# Patient Record
Sex: FEMALE | Race: WHITE | NOT HISPANIC OR LATINO | Employment: OTHER | RURAL
[De-identification: names, ages, dates, MRNs, and addresses within clinical notes are randomized per-mention and may not be internally consistent; named-entity substitution may affect disease eponyms.]

---

## 2018-02-08 ENCOUNTER — HISTORICAL (OUTPATIENT)
Dept: ADMINISTRATIVE | Facility: HOSPITAL | Age: 78
End: 2018-02-08

## 2018-02-09 LAB
LAB AP CLINICAL INFORMATION: NORMAL
LAB AP DIAGNOSIS - HISTORICAL: NORMAL
LAB AP GROSS PATHOLOGY - HISTORICAL: NORMAL
LAB AP SPECIMEN SUBMITTED - HISTORICAL: NORMAL

## 2020-12-11 ENCOUNTER — HISTORICAL (OUTPATIENT)
Dept: ADMINISTRATIVE | Facility: HOSPITAL | Age: 80
End: 2020-12-11

## 2020-12-11 LAB
BILIRUB UR QL STRIP: NEGATIVE MG/DL
CLARITY UR: CLEAR
COLOR UR: ABNORMAL
GLUCOSE UR STRIP-MCNC: NEGATIVE MG/DL
KETONES UR STRIP-SCNC: NEGATIVE MG/DL
LEUKOCYTE ESTERASE UR QL STRIP: NEGATIVE LEU/UL
NITRITE UR QL STRIP: NEGATIVE
PH UR STRIP: 6 PH UNITS (ref 5–8)
PROT UR QL STRIP: NEGATIVE MG/DL
RBC # UR STRIP: NEGATIVE ERY/UL
SP GR UR STRIP: <=1.005 (ref 1–1.03)
UROBILINOGEN UR STRIP-ACNC: 0.2 EU/DL

## 2021-01-05 ENCOUNTER — HISTORICAL (OUTPATIENT)
Dept: ADMINISTRATIVE | Facility: HOSPITAL | Age: 81
End: 2021-01-05

## 2021-01-05 LAB
ANION GAP SERPL CALCULATED.3IONS-SCNC: 13 MMOL/L
APTT PPP: 28.8 SECONDS (ref 25.2–37.3)
BASOPHILS # BLD AUTO: 0.03 X10E3/UL (ref 0–0.2)
BASOPHILS NFR BLD AUTO: 0.5 % (ref 0–1)
BILIRUB UR QL STRIP: NEGATIVE MG/DL
BUN SERPL-MCNC: 20 MG/DL (ref 7–18)
CALCIUM SERPL-MCNC: 9 MG/DL (ref 8.5–10.1)
CHLORIDE SERPL-SCNC: 104 MMOL/L (ref 98–107)
CK MB SERPL-MCNC: <1 NG/ML (ref 1–3.6)
CK SERPL-CCNC: 65 U/L (ref 26–192)
CLARITY UR: CLEAR
CO2 SERPL-SCNC: 28 MMOL/L (ref 21–32)
COLOR UR: ABNORMAL
CREAT SERPL-MCNC: 1.09 MG/DL (ref 0.55–1.02)
EOSINOPHIL # BLD AUTO: 0.18 X10E3/UL (ref 0–0.5)
EOSINOPHIL NFR BLD AUTO: 3 % (ref 1–4)
ERYTHROCYTE [DISTWIDTH] IN BLOOD BY AUTOMATED COUNT: 12.7 % (ref 11.5–14.5)
GLUCOSE SERPL-MCNC: 96 MG/DL (ref 74–106)
GLUCOSE UR STRIP-MCNC: NEGATIVE MG/DL
HCT VFR BLD AUTO: 42 % (ref 38–47)
HGB BLD-MCNC: 14 G/DL (ref 12–16)
IMM GRANULOCYTES # BLD AUTO: 0.02 X10E3/UL (ref 0–0.04)
IMM GRANULOCYTES NFR BLD: 0.3 % (ref 0–0.4)
INR BLD: 1.01 (ref 0–3.3)
KETONES UR STRIP-SCNC: NEGATIVE MG/DL
LEUKOCYTE ESTERASE UR QL STRIP: NEGATIVE LEU/UL
LYMPHOCYTES # BLD AUTO: 1.4 X10E3/UL (ref 1–4.8)
LYMPHOCYTES NFR BLD AUTO: 23.3 % (ref 27–41)
MAGNESIUM SERPL-MCNC: 1.9 MG/DL (ref 1.7–2.3)
MCH RBC QN AUTO: 29.8 PG (ref 27–31)
MCHC RBC AUTO-ENTMCNC: 33.3 G/DL (ref 32–36)
MCV RBC AUTO: 89.4 FL (ref 80–96)
MONOCYTES # BLD AUTO: 0.42 X10E3/UL (ref 0–0.8)
MONOCYTES NFR BLD AUTO: 7 % (ref 2–6)
MPC BLD CALC-MCNC: 9.7 FL (ref 9.4–12.4)
MYOGLOBIN SERPL-MCNC: 66 NG/ML (ref 13–71)
NEUTROPHILS # BLD AUTO: 3.97 X10E3/UL (ref 1.8–7.7)
NEUTROPHILS NFR BLD AUTO: 65.9 % (ref 53–65)
NITRITE UR QL STRIP: NEGATIVE
NRBC # BLD AUTO: 0 X10E3/UL (ref 0–0)
NRBC, AUTO (.00): 0 /100 (ref 0–0)
PH UR STRIP: 7.5 PH UNITS (ref 5–8)
PLATELET # BLD AUTO: 204 X10E3/UL (ref 150–400)
POTASSIUM SERPL-SCNC: 3.9 MMOL/L (ref 3.5–5.1)
PROT UR QL STRIP: NEGATIVE MG/DL
PROTHROMBIN TIME: 12.8 SECONDS (ref 11.7–14.7)
RBC # BLD AUTO: 4.7 X10E6/UL (ref 4.2–5.4)
RBC # UR STRIP: NEGATIVE ERY/UL
SARS-COV-2 RNA AMPLIFICATION, QUAL: NEGATIVE
SODIUM SERPL-SCNC: 141 MMOL/L (ref 136–145)
SP GR UR STRIP: 1.01 (ref 1–1.03)
TROPONIN I SERPL-MCNC: 0.32 NG/ML (ref 0–0.06)
UROBILINOGEN UR STRIP-ACNC: 0.2 EU/DL
WBC # BLD AUTO: 6.02 X10E3/UL (ref 4.5–11)

## 2021-01-29 ENCOUNTER — HISTORICAL (OUTPATIENT)
Dept: ADMINISTRATIVE | Facility: HOSPITAL | Age: 81
End: 2021-01-29

## 2021-04-23 DIAGNOSIS — M41.9 SCOLIOSIS, UNSPECIFIED SCOLIOSIS TYPE, UNSPECIFIED SPINAL REGION: Primary | ICD-10-CM

## 2021-05-09 ENCOUNTER — HISTORICAL (OUTPATIENT)
Dept: ADMINISTRATIVE | Facility: HOSPITAL | Age: 81
End: 2021-05-09

## 2021-05-09 LAB
ALBUMIN SERPL BCP-MCNC: 3.5 G/DL (ref 3.4–5)
ALBUMIN/GLOB SERPL: 1 {RATIO}
ALP SERPL-CCNC: 81 U/L (ref 50–136)
ALT SERPL W P-5'-P-CCNC: 22 U/L (ref 12–78)
ANION GAP SERPL CALCULATED.3IONS-SCNC: 13 MMOL/L
AST SERPL W P-5'-P-CCNC: 25 U/L (ref 15–37)
BASOPHILS # BLD AUTO: 0.04 X10E3/UL (ref 0–0.2)
BASOPHILS NFR BLD AUTO: 0.6 % (ref 0–1)
BILIRUB SERPL-MCNC: 0.3 MG/DL (ref 0.2–1)
BUN SERPL-MCNC: 21 MG/DL (ref 7–18)
CALCIUM SERPL-MCNC: 9 MG/DL (ref 8.5–10.1)
CHLORIDE SERPL-SCNC: 103 MMOL/L (ref 101–111)
CO2 SERPL-SCNC: 27 MMOL/L (ref 21–32)
CREAT SERPL-MCNC: 1.4 MG/DL (ref 0.6–1.3)
EOSINOPHIL # BLD AUTO: 0.14 X10E3/UL (ref 0–0.5)
EOSINOPHIL NFR BLD AUTO: 2.1 % (ref 1–4)
ERYTHROCYTE [DISTWIDTH] IN BLOOD BY AUTOMATED COUNT: 14.2 % (ref 11.5–14.5)
GLOBULIN SER-MCNC: 3.2 G/DL
GLUCOSE SERPL-MCNC: 91 MG/DL (ref 74–106)
HCT VFR BLD AUTO: 41.2 % (ref 38–47)
HGB BLD-MCNC: 13.4 G/DL (ref 12–16)
LYMPHOCYTES # BLD AUTO: 1.31 X10E3/UL (ref 1–4.8)
LYMPHOCYTES NFR BLD AUTO: 19.6 % (ref 27–41)
MCH RBC QN AUTO: 29.3 PG (ref 27–31)
MCHC RBC AUTO-ENTMCNC: 32.5 G/DL (ref 32–36)
MCV RBC AUTO: 90 FL (ref 80–96)
MONOCYTES # BLD AUTO: 0.72 X10E3/UL (ref 0–0.8)
MONOCYTES NFR BLD AUTO: 10.8 % (ref 2–6)
MPC BLD CALC-MCNC: 9.8 FL (ref 9.4–12.4)
NEUTROPHILS # BLD AUTO: 4.46 X10E3/UL (ref 1.8–7.7)
NEUTROPHILS NFR BLD AUTO: 66.9 % (ref 53–65)
PLATELET # BLD AUTO: 239 X10E3/UL (ref 150–400)
POTASSIUM SERPL-SCNC: 4.2 MMOL/L (ref 3.6–5)
PROT SERPL-MCNC: 6.7 G/DL (ref 6.4–8.2)
RBC # BLD AUTO: 4.57 X10E6/UL (ref 4.2–5.4)
SODIUM SERPL-SCNC: 139 MMOL/L (ref 135–145)
TROPONIN I SERPL-MCNC: 0.11 NG/ML (ref 0–0.06)
WBC # BLD AUTO: 6.67 X10E3/UL (ref 4.5–11)

## 2021-05-10 ENCOUNTER — HISTORICAL (OUTPATIENT)
Dept: ADMINISTRATIVE | Facility: HOSPITAL | Age: 81
End: 2021-05-10

## 2021-05-10 LAB — TROPONIN I SERPL-MCNC: 0.14 NG/ML (ref 0–0.06)

## 2021-06-04 VITALS — HEIGHT: 63 IN

## 2021-06-04 RX ORDER — FUROSEMIDE 20 MG/1
20 TABLET ORAL DAILY PRN
COMMUNITY
End: 2021-06-09

## 2021-06-04 RX ORDER — LEVOTHYROXINE SODIUM 75 UG/1
75 TABLET ORAL DAILY
COMMUNITY
Start: 2021-04-16 | End: 2021-06-22 | Stop reason: SDUPTHER

## 2021-06-04 RX ORDER — APIXABAN 5 MG/1
5 TABLET, FILM COATED ORAL 2 TIMES DAILY
COMMUNITY
Start: 2021-04-27 | End: 2021-06-22 | Stop reason: SDUPTHER

## 2021-06-04 RX ORDER — METOPROLOL SUCCINATE 50 MG/1
50 TABLET, EXTENDED RELEASE ORAL DAILY
COMMUNITY
End: 2021-06-09

## 2021-06-04 RX ORDER — ASPIRIN 81 MG/1
81 TABLET ORAL DAILY
COMMUNITY
End: 2021-06-09

## 2021-06-04 RX ORDER — ATORVASTATIN CALCIUM 10 MG/1
10 TABLET, FILM COATED ORAL DAILY
COMMUNITY
Start: 2021-03-16 | End: 2021-06-22 | Stop reason: SDUPTHER

## 2021-06-09 ENCOUNTER — OFFICE VISIT (OUTPATIENT)
Dept: CARDIOLOGY | Facility: CLINIC | Age: 81
End: 2021-06-09
Payer: MEDICARE

## 2021-06-09 VITALS
DIASTOLIC BLOOD PRESSURE: 62 MMHG | WEIGHT: 122 LBS | OXYGEN SATURATION: 99 % | HEART RATE: 73 BPM | HEIGHT: 63 IN | BODY MASS INDEX: 21.62 KG/M2 | SYSTOLIC BLOOD PRESSURE: 108 MMHG

## 2021-06-09 DIAGNOSIS — E78.5 HYPERLIPIDEMIA, UNSPECIFIED HYPERLIPIDEMIA TYPE: ICD-10-CM

## 2021-06-09 DIAGNOSIS — R06.02 SHORTNESS OF BREATH: ICD-10-CM

## 2021-06-09 DIAGNOSIS — I48.0 PAROXYSMAL ATRIAL FIBRILLATION: ICD-10-CM

## 2021-06-09 DIAGNOSIS — I25.10 CORONARY ARTERY DISEASE INVOLVING NATIVE CORONARY ARTERY OF NATIVE HEART WITHOUT ANGINA PECTORIS: ICD-10-CM

## 2021-06-09 DIAGNOSIS — I10 HYPERTENSION, UNSPECIFIED TYPE: ICD-10-CM

## 2021-06-09 DIAGNOSIS — I25.10 ATHEROSCLEROSIS OF NATIVE CORONARY ARTERY OF NATIVE HEART WITHOUT ANGINA PECTORIS: ICD-10-CM

## 2021-06-09 PROCEDURE — 99214 OFFICE O/P EST MOD 30 MIN: CPT | Mod: S$PBB,,, | Performed by: NURSE PRACTITIONER

## 2021-06-09 PROCEDURE — 99214 PR OFFICE/OUTPT VISIT, EST, LEVL IV, 30-39 MIN: ICD-10-PCS | Mod: S$PBB,,, | Performed by: NURSE PRACTITIONER

## 2021-06-09 PROCEDURE — 99213 OFFICE O/P EST LOW 20 MIN: CPT | Mod: PBBFAC | Performed by: NURSE PRACTITIONER

## 2021-06-10 PROBLEM — I25.10 CORONARY ARTERY DISEASE INVOLVING NATIVE CORONARY ARTERY OF NATIVE HEART: Status: ACTIVE | Noted: 2021-06-10

## 2021-06-23 ENCOUNTER — HOSPITAL ENCOUNTER (OUTPATIENT)
Dept: RADIOLOGY | Facility: HOSPITAL | Age: 81
Discharge: HOME | End: 2021-06-23
Attending: NURSE PRACTITIONER
Payer: MEDICARE

## 2021-06-23 ENCOUNTER — HOSPITAL ENCOUNTER (OUTPATIENT)
Dept: CARDIOLOGY | Facility: HOSPITAL | Age: 81
Discharge: HOME | End: 2021-06-23
Attending: NURSE PRACTITIONER
Payer: MEDICARE

## 2021-06-23 VITALS — HEIGHT: 63 IN | BODY MASS INDEX: 21.62 KG/M2 | WEIGHT: 122 LBS

## 2021-06-23 VITALS
WEIGHT: 122 LBS | HEIGHT: 63 IN | HEART RATE: 60 BPM | SYSTOLIC BLOOD PRESSURE: 139 MMHG | BODY MASS INDEX: 21.62 KG/M2 | DIASTOLIC BLOOD PRESSURE: 72 MMHG

## 2021-06-23 DIAGNOSIS — R06.02 SHORTNESS OF BREATH: ICD-10-CM

## 2021-06-23 DIAGNOSIS — I10 HYPERTENSION, UNSPECIFIED TYPE: ICD-10-CM

## 2021-06-23 DIAGNOSIS — I48.0 PAROXYSMAL ATRIAL FIBRILLATION: ICD-10-CM

## 2021-06-23 DIAGNOSIS — E78.5 HYPERLIPIDEMIA, UNSPECIFIED HYPERLIPIDEMIA TYPE: ICD-10-CM

## 2021-06-23 DIAGNOSIS — I25.10 ATHEROSCLEROSIS OF NATIVE CORONARY ARTERY OF NATIVE HEART WITHOUT ANGINA PECTORIS: ICD-10-CM

## 2021-06-23 PROCEDURE — 78452 HT MUSCLE IMAGE SPECT MULT: CPT | Mod: TC

## 2021-06-23 PROCEDURE — 93306 TTE W/DOPPLER COMPLETE: CPT

## 2021-06-23 PROCEDURE — 93018 NUCLEAR STRESS TEST (CUPID ONLY): ICD-10-PCS | Mod: ,,, | Performed by: INTERNAL MEDICINE

## 2021-06-23 PROCEDURE — 93306 TTE W/DOPPLER COMPLETE: CPT | Mod: 26,,, | Performed by: INTERNAL MEDICINE

## 2021-06-23 PROCEDURE — 78452 NM MYOCARDIAL PERFUSION SPECT MULTI PHARM: ICD-10-PCS | Mod: 26,,, | Performed by: INTERNAL MEDICINE

## 2021-06-23 PROCEDURE — 93016 CV STRESS TEST SUPVJ ONLY: CPT | Mod: ,,, | Performed by: NURSE PRACTITIONER

## 2021-06-23 PROCEDURE — 93017 CV STRESS TEST TRACING ONLY: CPT

## 2021-06-23 PROCEDURE — 93018 CV STRESS TEST I&R ONLY: CPT | Mod: ,,, | Performed by: INTERNAL MEDICINE

## 2021-06-23 PROCEDURE — 93306 ECHO (CUPID ONLY): ICD-10-PCS | Mod: 26,,, | Performed by: INTERNAL MEDICINE

## 2021-06-23 PROCEDURE — A9500 TC99M SESTAMIBI: HCPCS

## 2021-06-23 PROCEDURE — 78452 HT MUSCLE IMAGE SPECT MULT: CPT | Mod: 26,,, | Performed by: INTERNAL MEDICINE

## 2021-06-23 PROCEDURE — 93016 NUCLEAR STRESS TEST (CUPID ONLY): ICD-10-PCS | Mod: ,,, | Performed by: NURSE PRACTITIONER

## 2021-06-23 PROCEDURE — 63600175 PHARM REV CODE 636 W HCPCS: Performed by: NURSE PRACTITIONER

## 2021-06-23 RX ORDER — ATORVASTATIN CALCIUM 10 MG/1
10 TABLET, FILM COATED ORAL DAILY
Qty: 90 TABLET | Refills: 0 | Status: SHIPPED | OUTPATIENT
Start: 2021-06-23 | End: 2021-08-24 | Stop reason: SDUPTHER

## 2021-06-23 RX ORDER — APIXABAN 5 MG/1
5 TABLET, FILM COATED ORAL 2 TIMES DAILY
Qty: 180 TABLET | Refills: 0 | Status: SHIPPED | OUTPATIENT
Start: 2021-06-23 | End: 2021-11-01 | Stop reason: DRUGHIGH

## 2021-06-23 RX ORDER — REGADENOSON 0.08 MG/ML
0.4 INJECTION, SOLUTION INTRAVENOUS ONCE
Status: COMPLETED | OUTPATIENT
Start: 2021-06-23 | End: 2021-06-23

## 2021-06-23 RX ORDER — LEVOTHYROXINE SODIUM 75 UG/1
75 TABLET ORAL DAILY
Qty: 90 TABLET | Refills: 0 | Status: SHIPPED | OUTPATIENT
Start: 2021-06-23 | End: 2021-08-24 | Stop reason: SDUPTHER

## 2021-06-23 RX ADMIN — REGADENOSON 0.4 MG: 0.08 INJECTION, SOLUTION INTRAVENOUS at 10:06

## 2021-06-28 LAB
CV STRESS BASE HR: 60 BPM
DIASTOLIC BLOOD PRESSURE: 72 MMHG
OHS CV CPX 1 MINUTE RECOVERY HEART RATE: 83 BPM
OHS CV CPX 85 PERCENT MAX PREDICTED HEART RATE MALE: 114
OHS CV CPX MAX PREDICTED HEART RATE: 134
OHS CV CPX PATIENT IS FEMALE: 1
OHS CV CPX PATIENT IS MALE: 0
OHS CV CPX PEAK DIASTOLIC BLOOD PRESSURE: 72 MMHG
OHS CV CPX PEAK HEAR RATE: 88 BPM
OHS CV CPX PEAK RATE PRESSURE PRODUCT: NORMAL
OHS CV CPX PEAK SYSTOLIC BLOOD PRESSURE: 139 MMHG
OHS CV CPX PERCENT MAX PREDICTED HEART RATE ACHIEVED: 66
OHS CV CPX RATE PRESSURE PRODUCT PRESENTING: 8340
SYSTOLIC BLOOD PRESSURE: 139 MMHG

## 2021-07-01 LAB
AORTIC VALVE CUSP SEPERATION: 1.64 CM
AV INDEX (PROSTH): 0.95
AV MEAN GRADIENT: 2 MMHG
AV REGURGITATION PRESSURE HALF TIME: 670 MS
AV VALVE AREA: 2.99 CM2
BSA FOR ECHO PROCEDURE: 1.57 M2
CV ECHO LV RWT: 0.39 CM
DOP CALC AO VTI: 24.18 CM
DOP CALC LVOT AREA: 3.1 CM2
DOP CALC LVOT DIAMETER: 2 CM
DOP CALC LVOT STROKE VOLUME: 72.31 CM3
DOP CALCLVOT PEAK VEL VTI: 23.03 CM
E WAVE DECELERATION TIME: 533 MSEC
E/A RATIO: 0.73
ECHO LV POSTERIOR WALL: 0.79 CM (ref 0.6–1.1)
EJECTION FRACTION: 60 %
FRACTIONAL SHORTENING: 28 % (ref 28–44)
INTERVENTRICULAR SEPTUM: 0.75 CM (ref 0.6–1.1)
IVC DIAMETER: 1.52 CM
LEFT ATRIUM SIZE: 3 CM
LEFT INTERNAL DIMENSION IN SYSTOLE: 2.95 CM (ref 2.1–4)
LEFT VENTRICLE DIASTOLIC VOLUME INDEX: 27.26 ML/M2
LEFT VENTRICLE DIASTOLIC VOLUME: 42.8 ML
LEFT VENTRICLE MASS INDEX: 59 G/M2
LEFT VENTRICLE SYSTOLIC VOLUME INDEX: 10.1 ML/M2
LEFT VENTRICLE SYSTOLIC VOLUME: 15.9 ML
LEFT VENTRICULAR INTERNAL DIMENSION IN DIASTOLE: 4.09 CM (ref 3.5–6)
LEFT VENTRICULAR MASS: 92.15 G
LVOT MG: 1.7 MMHG
MV PEAK A VEL: 1.5 M/S
MV PEAK E VEL: 1.1 M/S
PISA AR MAX VEL: 3.89 M/S
PISA TR MAX VEL: 2.7 M/S
RA WIDTH: 2.06 CM
RIGHT VENTRICULAR END-DIASTOLIC DIMENSION: 2.8 CM
RIGHT VENTRICULAR LENGTH IN DIASTOLE (APICAL 4-CHAMBER VIEW): 5.85 CM
RV MID DIAMA: 2.83 CM
TR MAX PG: 29 MMHG
TRICUSPID ANNULAR PLANE SYSTOLIC EXCURSION: 2.44 CM

## 2021-07-08 ENCOUNTER — OFFICE VISIT (OUTPATIENT)
Dept: CARDIOLOGY | Facility: CLINIC | Age: 81
End: 2021-07-08
Payer: MEDICARE

## 2021-07-08 VITALS
SYSTOLIC BLOOD PRESSURE: 122 MMHG | HEIGHT: 63 IN | WEIGHT: 118 LBS | BODY MASS INDEX: 20.91 KG/M2 | OXYGEN SATURATION: 98 % | DIASTOLIC BLOOD PRESSURE: 58 MMHG | HEART RATE: 71 BPM

## 2021-07-08 DIAGNOSIS — I25.10 CORONARY ARTERY DISEASE INVOLVING NATIVE CORONARY ARTERY OF NATIVE HEART WITHOUT ANGINA PECTORIS: Primary | ICD-10-CM

## 2021-07-08 PROCEDURE — 99213 OFFICE O/P EST LOW 20 MIN: CPT | Mod: PBBFAC | Performed by: NURSE PRACTITIONER

## 2021-07-08 PROCEDURE — 99214 PR OFFICE/OUTPT VISIT, EST, LEVL IV, 30-39 MIN: ICD-10-PCS | Mod: S$PBB,,, | Performed by: NURSE PRACTITIONER

## 2021-07-08 PROCEDURE — 99214 OFFICE O/P EST MOD 30 MIN: CPT | Mod: S$PBB,,, | Performed by: NURSE PRACTITIONER

## 2021-07-08 RX ORDER — NITROGLYCERIN 0.4 MG/1
0.4 TABLET SUBLINGUAL EVERY 5 MIN PRN
Qty: 25 TABLET | Refills: 3 | Status: SHIPPED | OUTPATIENT
Start: 2021-07-08

## 2021-08-05 ENCOUNTER — HOSPITAL ENCOUNTER (EMERGENCY)
Facility: HOSPITAL | Age: 81
Discharge: HOME | End: 2021-08-05
Payer: MEDICARE

## 2021-08-05 VITALS
DIASTOLIC BLOOD PRESSURE: 57 MMHG | RESPIRATION RATE: 20 BRPM | HEART RATE: 64 BPM | SYSTOLIC BLOOD PRESSURE: 119 MMHG | TEMPERATURE: 98 F | BODY MASS INDEX: 22.09 KG/M2 | HEIGHT: 61 IN | OXYGEN SATURATION: 97 % | WEIGHT: 117 LBS

## 2021-08-05 DIAGNOSIS — R07.89 ACUTE CHEST WALL PAIN: ICD-10-CM

## 2021-08-05 DIAGNOSIS — R07.81 RIB PAIN ON RIGHT SIDE: Primary | ICD-10-CM

## 2021-08-05 PROCEDURE — 99282 PR EMERGENCY DEPT VISIT,LEVEL II: ICD-10-PCS | Mod: ,,, | Performed by: NURSE PRACTITIONER

## 2021-08-05 PROCEDURE — 99282 EMERGENCY DEPT VISIT SF MDM: CPT | Mod: ,,, | Performed by: NURSE PRACTITIONER

## 2021-08-05 PROCEDURE — 99283 EMERGENCY DEPT VISIT LOW MDM: CPT

## 2021-08-09 ENCOUNTER — TELEPHONE (OUTPATIENT)
Dept: EMERGENCY MEDICINE | Facility: HOSPITAL | Age: 81
End: 2021-08-09

## 2021-08-13 ENCOUNTER — OFFICE VISIT (OUTPATIENT)
Dept: NEUROLOGY | Facility: CLINIC | Age: 81
End: 2021-08-13
Payer: MEDICARE

## 2021-08-13 VITALS — DIASTOLIC BLOOD PRESSURE: 66 MMHG | SYSTOLIC BLOOD PRESSURE: 140 MMHG
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
## 2021-08-13 DIAGNOSIS — Z79.899 ON LONG TERM DRUG THERAPY: ICD-10-CM

## 2021-08-13 DIAGNOSIS — R26.9 ABNORMAL GAIT: ICD-10-CM

## 2021-08-13 DIAGNOSIS — I48.0 PAROXYSMAL ATRIAL FIBRILLATION: ICD-10-CM

## 2021-08-13 DIAGNOSIS — I63.9 CEREBROVASCULAR ACCIDENT (CVA), UNSPECIFIED MECHANISM: Primary | ICD-10-CM

## 2021-08-13 DIAGNOSIS — F03.90 DEMENTIA WITHOUT BEHAVIORAL DISTURBANCE, UNSPECIFIED DEMENTIA TYPE: ICD-10-CM

## 2021-08-13 DIAGNOSIS — I10 HYPERTENSION, UNSPECIFIED TYPE: ICD-10-CM
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
## 2021-08-13 PROCEDURE — 99213 PR OFFICE/OUTPT VISIT, EST, LEVL III, 20-29 MIN: ICD-10-PCS | Mod: S$PBB,ICN,, | Performed by: NURSE PRACTITIONER

## 2021-08-13 PROCEDURE — 99213 OFFICE O/P EST LOW 20 MIN: CPT | Mod: S$PBB,ICN,, | Performed by: NURSE PRACTITIONER

## 2021-08-13 PROCEDURE — 99213 OFFICE O/P EST LOW 20 MIN: CPT | Mod: PBBFAC | Performed by: NURSE PRACTITIONER

## 2021-08-16 RX ORDER — ERGOCALCIFEROL 1.25 MG/1
CAPSULE ORAL
Qty: 12 CAPSULE | Refills: 1 | Status: SHIPPED | OUTPATIENT
Start: 2021-08-16 | End: 2022-10-03

## 2021-08-17 ENCOUNTER — TELEPHONE (OUTPATIENT)
Dept: NEUROLOGY | Facility: CLINIC | Age: 81
End: 2021-08-17

## 2021-08-24 ENCOUNTER — OFFICE VISIT (OUTPATIENT)
Dept: FAMILY MEDICINE | Facility: CLINIC | Age: 81
End: 2021-08-24
Payer: MEDICARE

## 2021-08-24 VITALS
RESPIRATION RATE: 16 BRPM | BODY MASS INDEX: 23.18 KG/M2 | TEMPERATURE: 98 F | DIASTOLIC BLOOD PRESSURE: 78 MMHG | WEIGHT: 115 LBS | SYSTOLIC BLOOD PRESSURE: 134 MMHG | HEART RATE: 70 BPM | OXYGEN SATURATION: 99 % | HEIGHT: 59 IN

## 2021-08-24 DIAGNOSIS — R79.89 INCREASED AMMONIA LEVEL: Primary | ICD-10-CM

## 2021-08-24 DIAGNOSIS — E78.5 HYPERLIPIDEMIA, UNSPECIFIED HYPERLIPIDEMIA TYPE: ICD-10-CM

## 2021-08-24 DIAGNOSIS — E03.9 ACQUIRED HYPOTHYROIDISM: ICD-10-CM

## 2021-08-24 DIAGNOSIS — I48.11 LONGSTANDING PERSISTENT ATRIAL FIBRILLATION: ICD-10-CM

## 2021-08-24 LAB
ALBUMIN SERPL BCP-MCNC: 3.7 G/DL (ref 3.5–5)
ALP SERPL-CCNC: 106 U/L (ref 55–142)
ALT SERPL W P-5'-P-CCNC: 22 U/L (ref 13–56)
AMMONIA PLAS-SCNC: 15 ΜMOL/L (ref 11–32)
ANION GAP SERPL CALCULATED.3IONS-SCNC: 12 MMOL/L (ref 7–16)
APTT PPP: 39.3 SECONDS (ref 25.2–37.3)
AST SERPL W P-5'-P-CCNC: 28 U/L (ref 15–37)
BILIRUB DIRECT SERPL-MCNC: 0.2 MG/DL (ref 0–0.2)
BILIRUB SERPL-MCNC: 0.4 MG/DL (ref 0–1.2)
BUN SERPL-MCNC: 22 MG/DL (ref 7–18)
BUN/CREAT SERPL: 21 (ref 6–20)
CALCIUM SERPL-MCNC: 9.7 MG/DL (ref 8.5–10.1)
CHLORIDE SERPL-SCNC: 104 MMOL/L (ref 98–107)
CO2 SERPL-SCNC: 29 MMOL/L (ref 21–32)
CREAT SERPL-MCNC: 1.04 MG/DL (ref 0.55–1.02)
GLUCOSE SERPL-MCNC: 84 MG/DL (ref 74–106)
INR BLD: 1.17 (ref 0.9–1.1)
POTASSIUM SERPL-SCNC: 4.5 MMOL/L (ref 3.5–5.1)
PROT SERPL-MCNC: 7.4 G/DL (ref 6.4–8.2)
PROTHROMBIN TIME: 14.9 SECONDS (ref 11.7–14.7)
SODIUM SERPL-SCNC: 140 MMOL/L (ref 136–145)

## 2021-08-24 PROCEDURE — 82248 HEPATIC FUNCTION PANEL: ICD-10-PCS | Mod: ,,, | Performed by: CLINICAL MEDICAL LABORATORY

## 2021-08-24 PROCEDURE — 85610 PROTHROMBIN TIME: CPT | Performed by: INTERNAL MEDICINE

## 2021-08-24 PROCEDURE — 99214 OFFICE O/P EST MOD 30 MIN: CPT | Mod: ,,, | Performed by: INTERNAL MEDICINE

## 2021-08-24 PROCEDURE — 80053 COMPREHEN METABOLIC PANEL: CPT | Mod: ,,, | Performed by: CLINICAL MEDICAL LABORATORY

## 2021-08-24 PROCEDURE — 82140 AMMONIA: ICD-10-PCS | Mod: ,,, | Performed by: CLINICAL MEDICAL LABORATORY

## 2021-08-24 PROCEDURE — 85730 THROMBOPLASTIN TIME PARTIAL: CPT | Performed by: INTERNAL MEDICINE

## 2021-08-24 PROCEDURE — 82248 BILIRUBIN DIRECT: CPT | Mod: ,,, | Performed by: CLINICAL MEDICAL LABORATORY

## 2021-08-24 PROCEDURE — 80053 HEPATIC FUNCTION PANEL: ICD-10-PCS | Mod: ,,, | Performed by: CLINICAL MEDICAL LABORATORY

## 2021-08-24 PROCEDURE — 82140 ASSAY OF AMMONIA: CPT | Mod: ,,, | Performed by: CLINICAL MEDICAL LABORATORY

## 2021-08-24 PROCEDURE — 99214 PR OFFICE/OUTPT VISIT, EST, LEVL IV, 30-39 MIN: ICD-10-PCS | Mod: ,,, | Performed by: INTERNAL MEDICINE

## 2021-08-24 RX ORDER — LEVOTHYROXINE SODIUM 75 UG/1
75 TABLET ORAL DAILY
Qty: 90 TABLET | Refills: 1 | Status: ON HOLD | OUTPATIENT
Start: 2021-08-24 | End: 2021-12-19 | Stop reason: HOSPADM

## 2021-08-24 RX ORDER — ATORVASTATIN CALCIUM 10 MG/1
10 TABLET, FILM COATED ORAL DAILY
Qty: 90 TABLET | Refills: 1 | Status: ON HOLD | OUTPATIENT
Start: 2021-08-24 | End: 2021-12-19 | Stop reason: HOSPADM

## 2021-08-25 DIAGNOSIS — R79.89 INCREASED AMMONIA LEVEL: Primary | ICD-10-CM

## 2021-08-30 ENCOUNTER — HOSPITAL ENCOUNTER (EMERGENCY)
Facility: HOSPITAL | Age: 81
Discharge: HOME | End: 2021-08-30
Payer: MEDICARE

## 2021-08-30 VITALS
TEMPERATURE: 98 F | OXYGEN SATURATION: 99 % | HEIGHT: 59 IN | DIASTOLIC BLOOD PRESSURE: 84 MMHG | HEART RATE: 74 BPM | WEIGHT: 115 LBS | SYSTOLIC BLOOD PRESSURE: 167 MMHG | RESPIRATION RATE: 16 BRPM | BODY MASS INDEX: 23.18 KG/M2

## 2021-08-30 DIAGNOSIS — R07.9 CHEST PAIN: Primary | ICD-10-CM

## 2021-08-30 LAB
ALBUMIN SERPL BCP-MCNC: 3.4 G/DL (ref 3.5–5)
ALBUMIN/GLOB SERPL: 1 {RATIO}
ALP SERPL-CCNC: 92 U/L (ref 55–142)
ALT SERPL W P-5'-P-CCNC: 24 U/L (ref 13–56)
ANION GAP SERPL CALCULATED.3IONS-SCNC: 10 MMOL/L (ref 7–16)
AST SERPL W P-5'-P-CCNC: 24 U/L (ref 15–37)
BASOPHILS # BLD AUTO: 0.02 K/UL (ref 0–0.2)
BASOPHILS NFR BLD AUTO: 0.4 % (ref 0–1)
BILIRUB SERPL-MCNC: 0.5 MG/DL (ref 0–1.2)
BUN SERPL-MCNC: 33 MG/DL (ref 7–18)
BUN/CREAT SERPL: 28 (ref 6–20)
CALCIUM SERPL-MCNC: 8.7 MG/DL (ref 8.5–10.1)
CHLORIDE SERPL-SCNC: 103 MMOL/L (ref 98–107)
CO2 SERPL-SCNC: 31 MMOL/L (ref 21–32)
CREAT SERPL-MCNC: 1.16 MG/DL (ref 0.55–1.02)
DIFFERENTIAL METHOD BLD: ABNORMAL
EOSINOPHIL # BLD AUTO: 0.16 K/UL (ref 0–0.5)
EOSINOPHIL NFR BLD AUTO: 3.3 % (ref 1–4)
ERYTHROCYTE [DISTWIDTH] IN BLOOD BY AUTOMATED COUNT: 13.5 % (ref 11.5–14.5)
FLUAV AG UPPER RESP QL IA.RAPID: NEGATIVE
FLUBV AG UPPER RESP QL IA.RAPID: NEGATIVE
GLOBULIN SER-MCNC: 3.3 G/DL (ref 2–4)
GLUCOSE SERPL-MCNC: 113 MG/DL (ref 74–106)
HCT VFR BLD AUTO: 40.5 % (ref 38–47)
HGB BLD-MCNC: 14 G/DL (ref 12–16)
LYMPHOCYTES # BLD AUTO: 1.68 K/UL (ref 1–4.8)
LYMPHOCYTES NFR BLD AUTO: 34.6 % (ref 27–41)
MAGNESIUM SERPL-MCNC: 1.9 MG/DL (ref 1.7–2.3)
MCH RBC QN AUTO: 29.8 PG (ref 27–31)
MCHC RBC AUTO-ENTMCNC: 34.6 G/DL (ref 32–36)
MCV RBC AUTO: 86.2 FL (ref 80–96)
MONOCYTES # BLD AUTO: 0.4 K/UL (ref 0–0.8)
MONOCYTES NFR BLD AUTO: 8.2 % (ref 2–6)
MPC BLD CALC-MCNC: 9.4 FL (ref 9.4–12.4)
NEUTROPHILS # BLD AUTO: 2.6 K/UL (ref 1.8–7.7)
NEUTROPHILS NFR BLD AUTO: 53.5 % (ref 53–65)
NT-PROBNP SERPL-MCNC: 536 PG/ML (ref 1–450)
PLATELET # BLD AUTO: 232 K/UL (ref 150–400)
POTASSIUM SERPL-SCNC: 3.4 MMOL/L (ref 3.5–5.1)
PROT SERPL-MCNC: 6.7 G/DL (ref 6.4–8.2)
RBC # BLD AUTO: 4.7 M/UL (ref 4.2–5.4)
SARS-COV+SARS-COV-2 AG RESP QL IA.RAPID: NEGATIVE
SODIUM SERPL-SCNC: 141 MMOL/L (ref 136–145)
TROPONIN I SERPL-MCNC: 0.12 NG/ML
WBC # BLD AUTO: 4.86 K/UL (ref 4.5–11)

## 2021-08-30 PROCEDURE — 83735 ASSAY OF MAGNESIUM: CPT | Performed by: NURSE PRACTITIONER

## 2021-08-30 PROCEDURE — 99284 EMERGENCY DEPT VISIT MOD MDM: CPT | Mod: GF | Performed by: NURSE PRACTITIONER

## 2021-08-30 PROCEDURE — 80053 COMPREHEN METABOLIC PANEL: CPT | Performed by: NURSE PRACTITIONER

## 2021-08-30 PROCEDURE — 93010 ELECTROCARDIOGRAM REPORT: CPT | Mod: ,,, | Performed by: INTERNAL MEDICINE

## 2021-08-30 PROCEDURE — 99285 EMERGENCY DEPT VISIT HI MDM: CPT

## 2021-08-30 PROCEDURE — 93010 EKG 12-LEAD: ICD-10-PCS | Mod: ,,, | Performed by: INTERNAL MEDICINE

## 2021-08-30 PROCEDURE — 93005 ELECTROCARDIOGRAM TRACING: CPT

## 2021-08-30 PROCEDURE — 85025 COMPLETE CBC W/AUTO DIFF WBC: CPT | Performed by: NURSE PRACTITIONER

## 2021-08-30 PROCEDURE — 87428 SARSCOV & INF VIR A&B AG IA: CPT | Performed by: NURSE PRACTITIONER

## 2021-08-30 PROCEDURE — 83880 ASSAY OF NATRIURETIC PEPTIDE: CPT | Performed by: NURSE PRACTITIONER

## 2021-08-30 PROCEDURE — 84484 ASSAY OF TROPONIN QUANT: CPT | Performed by: NURSE PRACTITIONER

## 2021-08-30 PROCEDURE — 36415 COLL VENOUS BLD VENIPUNCTURE: CPT | Performed by: NURSE PRACTITIONER

## 2021-08-31 ENCOUNTER — TELEPHONE (OUTPATIENT)
Dept: EMERGENCY MEDICINE | Facility: HOSPITAL | Age: 81
End: 2021-08-31

## 2021-08-31 ENCOUNTER — HOSPITAL ENCOUNTER (OUTPATIENT)
Dept: RADIOLOGY | Facility: HOSPITAL | Age: 81
Discharge: HOME | End: 2021-08-31
Attending: INTERNAL MEDICINE
Payer: MEDICARE

## 2021-08-31 DIAGNOSIS — R79.89 INCREASED AMMONIA LEVEL: ICD-10-CM

## 2021-08-31 PROCEDURE — 76705 ECHO EXAM OF ABDOMEN: CPT | Mod: TC

## 2021-09-01 ENCOUNTER — OFFICE VISIT (OUTPATIENT)
Dept: FAMILY MEDICINE | Facility: CLINIC | Age: 81
End: 2021-09-01
Payer: MEDICARE

## 2021-09-01 VITALS
OXYGEN SATURATION: 98 % | DIASTOLIC BLOOD PRESSURE: 70 MMHG | SYSTOLIC BLOOD PRESSURE: 114 MMHG | HEART RATE: 73 BPM | BODY MASS INDEX: 23.23 KG/M2 | TEMPERATURE: 98 F | WEIGHT: 115 LBS | RESPIRATION RATE: 16 BRPM

## 2021-09-01 DIAGNOSIS — K82.4 GALLBLADDER POLYP: Primary | ICD-10-CM

## 2021-09-01 DIAGNOSIS — I25.118 CORONARY ARTERY DISEASE OF NATIVE ARTERY OF NATIVE HEART WITH STABLE ANGINA PECTORIS: ICD-10-CM

## 2021-09-01 PROCEDURE — 99214 PR OFFICE/OUTPT VISIT, EST, LEVL IV, 30-39 MIN: ICD-10-PCS | Mod: ,,, | Performed by: INTERNAL MEDICINE

## 2021-09-01 PROCEDURE — 93005 ELECTROCARDIOGRAM TRACING: CPT | Mod: RHCUB | Performed by: INTERNAL MEDICINE

## 2021-09-01 PROCEDURE — 93010 PR ELECTROCARDIOGRAM REPORT: ICD-10-PCS | Mod: ,,, | Performed by: INTERNAL MEDICINE

## 2021-09-01 PROCEDURE — 93010 ELECTROCARDIOGRAM REPORT: CPT | Mod: ,,, | Performed by: INTERNAL MEDICINE

## 2021-09-01 PROCEDURE — 99214 OFFICE O/P EST MOD 30 MIN: CPT | Mod: ,,, | Performed by: INTERNAL MEDICINE

## 2021-09-03 ENCOUNTER — OFFICE VISIT (OUTPATIENT)
Dept: CARDIOLOGY | Facility: CLINIC | Age: 81
End: 2021-09-03
Payer: MEDICARE

## 2021-09-03 VITALS
BODY MASS INDEX: 20.73 KG/M2 | WEIGHT: 117 LBS | HEIGHT: 63 IN | SYSTOLIC BLOOD PRESSURE: 110 MMHG | HEART RATE: 62 BPM | RESPIRATION RATE: 18 BRPM | DIASTOLIC BLOOD PRESSURE: 54 MMHG

## 2021-09-03 DIAGNOSIS — I48.0 PAROXYSMAL ATRIAL FIBRILLATION: ICD-10-CM

## 2021-09-03 DIAGNOSIS — I10 ESSENTIAL HYPERTENSION: ICD-10-CM

## 2021-09-03 DIAGNOSIS — E78.5 HYPERLIPIDEMIA, UNSPECIFIED HYPERLIPIDEMIA TYPE: ICD-10-CM

## 2021-09-03 DIAGNOSIS — R07.89 OTHER CHEST PAIN: Primary | ICD-10-CM

## 2021-09-03 PROCEDURE — 99214 PR OFFICE/OUTPT VISIT, EST, LEVL IV, 30-39 MIN: ICD-10-PCS | Mod: S$PBB,,, | Performed by: INTERNAL MEDICINE

## 2021-09-03 PROCEDURE — 99214 OFFICE O/P EST MOD 30 MIN: CPT | Mod: S$PBB,,, | Performed by: INTERNAL MEDICINE

## 2021-09-03 PROCEDURE — 99213 OFFICE O/P EST LOW 20 MIN: CPT | Mod: PBBFAC | Performed by: INTERNAL MEDICINE

## 2021-09-03 RX ORDER — OMEPRAZOLE 20 MG/1
TABLET, DELAYED RELEASE ORAL
Qty: 37 TABLET | Refills: 1 | Status: SHIPPED | OUTPATIENT
Start: 2021-09-03 | End: 2021-12-22 | Stop reason: SDUPTHER

## 2021-09-07 PROBLEM — R07.89 OTHER CHEST PAIN: Status: ACTIVE | Noted: 2021-09-07

## 2021-10-13 ENCOUNTER — OFFICE VISIT (OUTPATIENT)
Dept: CARDIOLOGY | Facility: CLINIC | Age: 81
End: 2021-10-13
Payer: MEDICARE

## 2021-10-13 ENCOUNTER — HOSPITAL ENCOUNTER (EMERGENCY)
Facility: HOSPITAL | Age: 81
Discharge: HOME | End: 2021-10-13
Attending: EMERGENCY MEDICINE
Payer: MEDICARE

## 2021-10-13 VITALS
BODY MASS INDEX: 20.91 KG/M2 | RESPIRATION RATE: 23 BRPM | SYSTOLIC BLOOD PRESSURE: 124 MMHG | HEART RATE: 69 BPM | WEIGHT: 118 LBS | DIASTOLIC BLOOD PRESSURE: 84 MMHG | HEIGHT: 63 IN | OXYGEN SATURATION: 100 % | TEMPERATURE: 98 F

## 2021-10-13 VITALS
WEIGHT: 118.5 LBS | DIASTOLIC BLOOD PRESSURE: 60 MMHG | HEIGHT: 63 IN | RESPIRATION RATE: 12 BRPM | SYSTOLIC BLOOD PRESSURE: 104 MMHG | HEART RATE: 60 BPM | BODY MASS INDEX: 21 KG/M2

## 2021-10-13 DIAGNOSIS — I25.10 CORONARY ARTERY DISEASE INVOLVING NATIVE CORONARY ARTERY OF NATIVE HEART WITHOUT ANGINA PECTORIS: ICD-10-CM

## 2021-10-13 DIAGNOSIS — E78.5 HYPERLIPIDEMIA: ICD-10-CM

## 2021-10-13 DIAGNOSIS — F03.90 DEMENTIA WITHOUT BEHAVIORAL DISTURBANCE: ICD-10-CM

## 2021-10-13 DIAGNOSIS — I10 HYPERTENSION: ICD-10-CM

## 2021-10-13 DIAGNOSIS — R26.9 ABNORMAL GAIT: ICD-10-CM

## 2021-10-13 DIAGNOSIS — E78.5 HYPERLIPIDEMIA, UNSPECIFIED HYPERLIPIDEMIA TYPE: ICD-10-CM

## 2021-10-13 DIAGNOSIS — I48.20 CHRONIC ATRIAL FIBRILLATION: Primary | ICD-10-CM

## 2021-10-13 DIAGNOSIS — I25.10 CORONARY ARTERY DISEASE INVOLVING NATIVE CORONARY ARTERY OF NATIVE HEART: ICD-10-CM

## 2021-10-13 DIAGNOSIS — I48.0 PAROXYSMAL ATRIAL FIBRILLATION: ICD-10-CM

## 2021-10-13 DIAGNOSIS — Z86.73 HISTORY OF CVA (CEREBROVASCULAR ACCIDENT): ICD-10-CM

## 2021-10-13 DIAGNOSIS — R07.89 OTHER CHEST PAIN: ICD-10-CM

## 2021-10-13 DIAGNOSIS — I48.91 ATRIAL FIBRILLATION: ICD-10-CM

## 2021-10-13 DIAGNOSIS — R00.0 TACHYCARDIA: ICD-10-CM

## 2021-10-13 DIAGNOSIS — I63.9 CEREBROVASCULAR ACCIDENT (CVA): ICD-10-CM

## 2021-10-13 DIAGNOSIS — I48.91 ATRIAL FIBRILLATION WITH RVR: ICD-10-CM
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
## 2021-10-13 DIAGNOSIS — I10 ESSENTIAL HYPERTENSION: ICD-10-CM

## 2021-10-13 DIAGNOSIS — R07.89 OTHER CHEST PAIN: Primary | ICD-10-CM

## 2021-10-13 DIAGNOSIS — I49.9 ARRHYTHMIA: ICD-10-CM

## 2021-10-13 LAB
ALBUMIN SERPL BCP-MCNC: 3.7 G/DL (ref 3.5–5)
ALBUMIN/GLOB SERPL: 1.2 {RATIO}
ALP SERPL-CCNC: 101 U/L (ref 55–142)
ALT SERPL W P-5'-P-CCNC: 23 U/L (ref 13–56)
ANION GAP SERPL CALCULATED.3IONS-SCNC: 14 MMOL/L (ref 7–16)
AST SERPL W P-5'-P-CCNC: 24 U/L (ref 15–37)
BASOPHILS # BLD AUTO: 0.03 K/UL (ref 0–0.2)
BASOPHILS NFR BLD AUTO: 0.5 % (ref 0–1)
BILIRUB SERPL-MCNC: 0.5 MG/DL (ref 0–1.2)
BUN SERPL-MCNC: 26 MG/DL (ref 7–18)
BUN/CREAT SERPL: 20 (ref 6–20)
CALCIUM SERPL-MCNC: 9 MG/DL (ref 8.5–10.1)
CHLORIDE SERPL-SCNC: 106 MMOL/L (ref 98–107)
CO2 SERPL-SCNC: 27 MMOL/L (ref 21–32)
CREAT SERPL-MCNC: 1.27 MG/DL (ref 0.55–1.02)
DIFFERENTIAL METHOD BLD: ABNORMAL
EOSINOPHIL # BLD AUTO: 0.11 K/UL (ref 0–0.5)
EOSINOPHIL NFR BLD AUTO: 2 % (ref 1–4)
ERYTHROCYTE [DISTWIDTH] IN BLOOD BY AUTOMATED COUNT: 13.7 % (ref 11.5–14.5)
GLOBULIN SER-MCNC: 3 G/DL (ref 2–4)
GLUCOSE SERPL-MCNC: 90 MG/DL (ref 74–106)
HCT VFR BLD AUTO: 41.7 % (ref 38–47)
HGB BLD-MCNC: 13.6 G/DL (ref 12–16)
IMM GRANULOCYTES # BLD AUTO: 0.01 K/UL (ref 0–0.04)
IMM GRANULOCYTES NFR BLD: 0.2 % (ref 0–0.4)
LYMPHOCYTES # BLD AUTO: 2 K/UL (ref 1–4.8)
LYMPHOCYTES NFR BLD AUTO: 36.4 % (ref 27–41)
MCH RBC QN AUTO: 28.9 PG (ref 27–31)
MCHC RBC AUTO-ENTMCNC: 32.6 G/DL (ref 32–36)
MCV RBC AUTO: 88.7 FL (ref 80–96)
MONOCYTES # BLD AUTO: 0.54 K/UL (ref 0–0.8)
MONOCYTES NFR BLD AUTO: 9.8 % (ref 2–6)
MPC BLD CALC-MCNC: 9.9 FL (ref 9.4–12.4)
NEUTROPHILS # BLD AUTO: 2.81 K/UL (ref 1.8–7.7)
NEUTROPHILS NFR BLD AUTO: 51.1 % (ref 53–65)
NRBC # BLD AUTO: 0 X10E3/UL
NRBC, AUTO (.00): 0 %
PLATELET # BLD AUTO: 210 K/UL (ref 150–400)
POTASSIUM SERPL-SCNC: 4.5 MMOL/L (ref 3.5–5.1)
PROT SERPL-MCNC: 6.7 G/DL (ref 6.4–8.2)
RBC # BLD AUTO: 4.7 M/UL (ref 4.2–5.4)
SODIUM SERPL-SCNC: 142 MMOL/L (ref 136–145)
TROPONIN I SERPL-MCNC: 0.1 NG/ML
TSH SERPL DL<=0.005 MIU/L-ACNC: 0.07 UIU/ML (ref 0.36–3.74)
WBC # BLD AUTO: 5.5 K/UL (ref 4.5–11)

## 2021-10-13 PROCEDURE — 96361 HYDRATE IV INFUSION ADD-ON: CPT

## 2021-10-13 PROCEDURE — 93005 ELECTROCARDIOGRAM TRACING: CPT | Mod: PBBFAC | Performed by: INTERNAL MEDICINE

## 2021-10-13 PROCEDURE — 99214 OFFICE O/P EST MOD 30 MIN: CPT | Mod: S$PBB,,, | Performed by: INTERNAL MEDICINE

## 2021-10-13 PROCEDURE — 80053 COMPREHEN METABOLIC PANEL: CPT | Performed by: EMERGENCY MEDICINE

## 2021-10-13 PROCEDURE — 93010 ELECTROCARDIOGRAM REPORT: CPT | Mod: 76,,, | Performed by: STUDENT IN AN ORGANIZED HEALTH CARE EDUCATION/TRAINING PROGRAM

## 2021-10-13 PROCEDURE — 99213 OFFICE O/P EST LOW 20 MIN: CPT | Mod: PBBFAC,25 | Performed by: INTERNAL MEDICINE

## 2021-10-13 PROCEDURE — 99284 PR EMERGENCY DEPT VISIT,LEVEL IV: ICD-10-PCS | Mod: ,,, | Performed by: EMERGENCY MEDICINE

## 2021-10-13 PROCEDURE — 84443 ASSAY THYROID STIM HORMONE: CPT | Performed by: EMERGENCY MEDICINE

## 2021-10-13 PROCEDURE — 93010 EKG 12-LEAD: ICD-10-PCS | Mod: S$PBB,,, | Performed by: INTERNAL MEDICINE

## 2021-10-13 PROCEDURE — 36415 COLL VENOUS BLD VENIPUNCTURE: CPT | Performed by: EMERGENCY MEDICINE

## 2021-10-13 PROCEDURE — 93010 EKG 12-LEAD: ICD-10-PCS | Mod: 76,,, | Performed by: STUDENT IN AN ORGANIZED HEALTH CARE EDUCATION/TRAINING PROGRAM

## 2021-10-13 PROCEDURE — 99284 EMERGENCY DEPT VISIT MOD MDM: CPT | Mod: ,,, | Performed by: EMERGENCY MEDICINE

## 2021-10-13 PROCEDURE — 93010 ELECTROCARDIOGRAM REPORT: CPT | Mod: S$PBB,,, | Performed by: INTERNAL MEDICINE

## 2021-10-13 PROCEDURE — 85025 COMPLETE CBC W/AUTO DIFF WBC: CPT | Performed by: EMERGENCY MEDICINE

## 2021-10-13 PROCEDURE — 25000003 PHARM REV CODE 250: Performed by: EMERGENCY MEDICINE

## 2021-10-13 PROCEDURE — 99214 PR OFFICE/OUTPT VISIT, EST, LEVL IV, 30-39 MIN: ICD-10-PCS | Mod: S$PBB,,, | Performed by: INTERNAL MEDICINE

## 2021-10-13 PROCEDURE — 99285 EMERGENCY DEPT VISIT HI MDM: CPT | Mod: 25

## 2021-10-13 PROCEDURE — 96374 THER/PROPH/DIAG INJ IV PUSH: CPT

## 2021-10-13 PROCEDURE — 93005 ELECTROCARDIOGRAM TRACING: CPT

## 2021-10-13 PROCEDURE — 84484 ASSAY OF TROPONIN QUANT: CPT | Performed by: EMERGENCY MEDICINE

## 2021-10-13 RX ORDER — DILTIAZEM HYDROCHLORIDE EXTENDED-RELEASE TABLETS 180 MG/1
180 TABLET, EXTENDED RELEASE ORAL DAILY
Qty: 30 TABLET | Refills: 11 | Status: ON HOLD | OUTPATIENT
Start: 2021-10-14 | End: 2021-12-19 | Stop reason: HOSPADM

## 2021-10-13 RX ORDER — DILTIAZEM HYDROCHLORIDE 5 MG/ML
10 INJECTION INTRAVENOUS
Status: COMPLETED | OUTPATIENT
Start: 2021-10-13 | End: 2021-10-13

## 2021-10-13 RX ORDER — DILTIAZEM HYDROCHLORIDE 180 MG/1
180 CAPSULE, COATED, EXTENDED RELEASE ORAL DAILY
Status: DISCONTINUED | OUTPATIENT
Start: 2021-10-14 | End: 2021-10-13 | Stop reason: HOSPADM

## 2021-10-13 RX ADMIN — SODIUM CHLORIDE 250 ML: 9 INJECTION, SOLUTION INTRAVENOUS at 03:10

## 2021-10-13 RX ADMIN — DILTIAZEM HYDROCHLORIDE 10 MG: 5 INJECTION INTRAVENOUS at 03:10

## 2021-10-18 PROBLEM — Z86.73 HISTORY OF CVA (CEREBROVASCULAR ACCIDENT): Status: ACTIVE | Noted: 2021-10-18

## 2021-11-01 ENCOUNTER — OFFICE VISIT (OUTPATIENT)
Dept: CARDIOLOGY | Facility: CLINIC | Age: 81
End: 2021-11-01
Payer: MEDICARE

## 2021-11-01 VITALS
RESPIRATION RATE: 16 BRPM | BODY MASS INDEX: 21.17 KG/M2 | HEART RATE: 86 BPM | WEIGHT: 119.5 LBS | SYSTOLIC BLOOD PRESSURE: 114 MMHG | DIASTOLIC BLOOD PRESSURE: 62 MMHG | HEIGHT: 63 IN

## 2021-11-01 DIAGNOSIS — I48.0 PAROXYSMAL ATRIAL FIBRILLATION: Primary | Chronic | ICD-10-CM

## 2021-11-01 DIAGNOSIS — E78.5 HYPERLIPIDEMIA, UNSPECIFIED HYPERLIPIDEMIA TYPE: Chronic | ICD-10-CM

## 2021-11-01 DIAGNOSIS — I25.10 CORONARY ARTERY DISEASE INVOLVING NATIVE CORONARY ARTERY OF NATIVE HEART WITHOUT ANGINA PECTORIS: Chronic | ICD-10-CM

## 2021-11-01 DIAGNOSIS — I10 HYPERTENSION, UNSPECIFIED TYPE: Chronic | ICD-10-CM

## 2021-11-01 PROCEDURE — 99214 OFFICE O/P EST MOD 30 MIN: CPT | Mod: S$PBB,,, | Performed by: INTERNAL MEDICINE

## 2021-11-01 PROCEDURE — 99214 OFFICE O/P EST MOD 30 MIN: CPT | Mod: PBBFAC | Performed by: INTERNAL MEDICINE

## 2021-11-01 PROCEDURE — 93005 ELECTROCARDIOGRAM TRACING: CPT | Mod: PBBFAC | Performed by: INTERNAL MEDICINE

## 2021-11-01 PROCEDURE — 93010 EKG 12-LEAD: ICD-10-PCS | Mod: S$PBB,,, | Performed by: INTERNAL MEDICINE

## 2021-11-01 PROCEDURE — 93010 ELECTROCARDIOGRAM REPORT: CPT | Mod: S$PBB,,, | Performed by: INTERNAL MEDICINE

## 2021-11-01 PROCEDURE — 99214 PR OFFICE/OUTPT VISIT, EST, LEVL IV, 30-39 MIN: ICD-10-PCS | Mod: S$PBB,,, | Performed by: INTERNAL MEDICINE

## 2021-12-17 ENCOUNTER — HOSPITAL ENCOUNTER (EMERGENCY)
Facility: HOSPITAL | Age: 81
Discharge: ANOTHER HOSPITAL | End: 2021-12-17
Attending: FAMILY MEDICINE
Payer: MEDICARE

## 2021-12-17 ENCOUNTER — HOSPITAL ENCOUNTER (OUTPATIENT)
Facility: HOSPITAL | Age: 81
Discharge: HOME | End: 2021-12-19
Attending: INTERNAL MEDICINE | Admitting: INTERNAL MEDICINE
Payer: MEDICARE

## 2021-12-17 VITALS
HEART RATE: 103 BPM | DIASTOLIC BLOOD PRESSURE: 70 MMHG | HEIGHT: 63 IN | WEIGHT: 120 LBS | RESPIRATION RATE: 18 BRPM | OXYGEN SATURATION: 98 % | SYSTOLIC BLOOD PRESSURE: 134 MMHG | TEMPERATURE: 99 F | BODY MASS INDEX: 21.26 KG/M2

## 2021-12-17 DIAGNOSIS — I10 PRIMARY HYPERTENSION: ICD-10-CM

## 2021-12-17 DIAGNOSIS — R07.9 CHEST PAIN: Primary | ICD-10-CM

## 2021-12-17 DIAGNOSIS — R07.9 CHEST PAIN: ICD-10-CM

## 2021-12-17 DIAGNOSIS — Z86.73 HISTORY OF CVA (CEREBROVASCULAR ACCIDENT): ICD-10-CM

## 2021-12-17 DIAGNOSIS — I48.11 LONGSTANDING PERSISTENT ATRIAL FIBRILLATION: ICD-10-CM

## 2021-12-17 DIAGNOSIS — I48.20 CHRONIC ATRIAL FIBRILLATION: Primary | ICD-10-CM

## 2021-12-17 DIAGNOSIS — I25.10 CORONARY ARTERY DISEASE INVOLVING NATIVE CORONARY ARTERY OF NATIVE HEART WITHOUT ANGINA PECTORIS: ICD-10-CM

## 2021-12-17 DIAGNOSIS — I24.9 ACS (ACUTE CORONARY SYNDROME): ICD-10-CM

## 2021-12-17 DIAGNOSIS — F03.90 DEMENTIA WITHOUT BEHAVIORAL DISTURBANCE, UNSPECIFIED DEMENTIA TYPE: ICD-10-CM

## 2021-12-17 DIAGNOSIS — R79.89 ELEVATED TROPONIN: ICD-10-CM

## 2021-12-17 DIAGNOSIS — R79.89 ELEVATED BRAIN NATRIURETIC PEPTIDE (BNP) LEVEL: ICD-10-CM

## 2021-12-17 DIAGNOSIS — E78.5 HYPERLIPIDEMIA, UNSPECIFIED HYPERLIPIDEMIA TYPE: ICD-10-CM

## 2021-12-17 LAB
ALBUMIN SERPL BCP-MCNC: 3.7 G/DL (ref 3.5–5)
ALBUMIN/GLOB SERPL: 1.3 {RATIO}
ALP SERPL-CCNC: 98 U/L (ref 55–142)
ALT SERPL W P-5'-P-CCNC: 20 U/L (ref 13–56)
ANION GAP SERPL CALCULATED.3IONS-SCNC: 17 MMOL/L (ref 7–16)
AST SERPL W P-5'-P-CCNC: 21 U/L (ref 15–37)
BASOPHILS # BLD AUTO: 0.02 K/UL (ref 0–0.2)
BASOPHILS NFR BLD AUTO: 0.5 % (ref 0–1)
BILIRUB SERPL-MCNC: 0.4 MG/DL (ref 0–1.2)
BUN SERPL-MCNC: 21 MG/DL (ref 7–18)
BUN/CREAT SERPL: 15 (ref 6–20)
CALCIUM SERPL-MCNC: 8.6 MG/DL (ref 8.5–10.1)
CHLORIDE SERPL-SCNC: 102 MMOL/L (ref 98–107)
CO2 SERPL-SCNC: 23 MMOL/L (ref 21–32)
CREAT SERPL-MCNC: 1.4 MG/DL (ref 0.55–1.02)
DIFFERENTIAL METHOD BLD: ABNORMAL
EOSINOPHIL # BLD AUTO: 0.08 K/UL (ref 0–0.5)
EOSINOPHIL NFR BLD AUTO: 1.9 % (ref 1–4)
ERYTHROCYTE [DISTWIDTH] IN BLOOD BY AUTOMATED COUNT: 13.4 % (ref 11.5–14.5)
GLOBULIN SER-MCNC: 2.9 G/DL (ref 2–4)
GLUCOSE SERPL-MCNC: 109 MG/DL (ref 74–106)
HCT VFR BLD AUTO: 37.7 % (ref 38–47)
HGB BLD-MCNC: 13 G/DL (ref 12–16)
INR BLD: 1.31 (ref 0.9–1.1)
LYMPHOCYTES # BLD AUTO: 1.55 K/UL (ref 1–4.8)
LYMPHOCYTES NFR BLD AUTO: 36.5 % (ref 27–41)
MCH RBC QN AUTO: 29.3 PG (ref 27–31)
MCHC RBC AUTO-ENTMCNC: 34.5 G/DL (ref 32–36)
MCV RBC AUTO: 85.1 FL (ref 80–96)
MONOCYTES # BLD AUTO: 0.37 K/UL (ref 0–0.8)
MONOCYTES NFR BLD AUTO: 8.7 % (ref 2–6)
MPC BLD CALC-MCNC: 9.3 FL (ref 9.4–12.4)
NEUTROPHILS # BLD AUTO: 2.23 K/UL (ref 1.8–7.7)
NEUTROPHILS NFR BLD AUTO: 52.4 % (ref 53–65)
NT-PROBNP SERPL-MCNC: 1855 PG/ML (ref 1–450)
PLATELET # BLD AUTO: 239 K/UL (ref 150–400)
POTASSIUM SERPL-SCNC: 3.9 MMOL/L (ref 3.5–5.1)
PROT SERPL-MCNC: 6.6 G/DL (ref 6.4–8.2)
PROTHROMBIN TIME: 16.2 SECONDS (ref 11.7–14.7)
RBC # BLD AUTO: 4.43 M/UL (ref 4.2–5.4)
SODIUM SERPL-SCNC: 138 MMOL/L (ref 136–145)
TROPONIN I SERPL HS-MCNC: 104.9 PG/ML
TROPONIN I SERPL HS-MCNC: 97.9 PG/ML
WBC # BLD AUTO: 4.25 K/UL (ref 4.5–11)

## 2021-12-17 PROCEDURE — 80061 LIPID PANEL: CPT | Performed by: FAMILY MEDICINE

## 2021-12-17 PROCEDURE — 99285 EMERGENCY DEPT VISIT HI MDM: CPT | Mod: 25

## 2021-12-17 PROCEDURE — 93010 ELECTROCARDIOGRAM REPORT: CPT | Mod: ,,, | Performed by: INTERNAL MEDICINE

## 2021-12-17 PROCEDURE — G0378 HOSPITAL OBSERVATION PER HR: HCPCS

## 2021-12-17 PROCEDURE — 85610 PROTHROMBIN TIME: CPT | Performed by: FAMILY MEDICINE

## 2021-12-17 PROCEDURE — 85025 COMPLETE CBC W/AUTO DIFF WBC: CPT | Performed by: FAMILY MEDICINE

## 2021-12-17 PROCEDURE — 25000003 PHARM REV CODE 250: Performed by: FAMILY MEDICINE

## 2021-12-17 PROCEDURE — 84484 ASSAY OF TROPONIN QUANT: CPT | Performed by: FAMILY MEDICINE

## 2021-12-17 PROCEDURE — 36415 COLL VENOUS BLD VENIPUNCTURE: CPT | Performed by: FAMILY MEDICINE

## 2021-12-17 PROCEDURE — 80053 COMPREHEN METABOLIC PANEL: CPT | Performed by: FAMILY MEDICINE

## 2021-12-17 PROCEDURE — 99220 PR INITIAL OBSERVATION CARE,LEVL III: ICD-10-PCS | Mod: ,,, | Performed by: HOSPITALIST

## 2021-12-17 PROCEDURE — 83880 ASSAY OF NATRIURETIC PEPTIDE: CPT | Performed by: FAMILY MEDICINE

## 2021-12-17 PROCEDURE — 99284 EMERGENCY DEPT VISIT MOD MDM: CPT | Performed by: FAMILY MEDICINE

## 2021-12-17 PROCEDURE — 93010 EKG 12-LEAD: ICD-10-PCS | Mod: ,,, | Performed by: INTERNAL MEDICINE

## 2021-12-17 PROCEDURE — G0379 DIRECT REFER HOSPITAL OBSERV: HCPCS

## 2021-12-17 PROCEDURE — 99220 PR INITIAL OBSERVATION CARE,LEVL III: CPT | Mod: ,,, | Performed by: HOSPITALIST

## 2021-12-17 PROCEDURE — 93005 ELECTROCARDIOGRAM TRACING: CPT

## 2021-12-17 RX ORDER — TRAZODONE HYDROCHLORIDE 50 MG/1
50 TABLET ORAL NIGHTLY PRN
Status: DISCONTINUED | OUTPATIENT
Start: 2021-12-17 | End: 2021-12-19 | Stop reason: HOSPADM

## 2021-12-17 RX ORDER — MORPHINE SULFATE 2 MG/ML
2 INJECTION, SOLUTION INTRAMUSCULAR; INTRAVENOUS EVERY 4 HOURS PRN
Status: DISCONTINUED | OUTPATIENT
Start: 2021-12-17 | End: 2021-12-19 | Stop reason: HOSPADM

## 2021-12-17 RX ORDER — ACETAMINOPHEN 325 MG/1
650 TABLET ORAL EVERY 6 HOURS PRN
Status: DISCONTINUED | OUTPATIENT
Start: 2021-12-17 | End: 2021-12-19 | Stop reason: HOSPADM

## 2021-12-17 RX ORDER — BISACODYL 5 MG
10 TABLET, DELAYED RELEASE (ENTERIC COATED) ORAL DAILY PRN
Status: DISCONTINUED | OUTPATIENT
Start: 2021-12-17 | End: 2021-12-19 | Stop reason: HOSPADM

## 2021-12-17 RX ORDER — GUAIFENESIN/DEXTROMETHORPHAN 100-10MG/5
10 SYRUP ORAL EVERY 4 HOURS PRN
Status: DISCONTINUED | OUTPATIENT
Start: 2021-12-17 | End: 2021-12-19 | Stop reason: HOSPADM

## 2021-12-17 RX ORDER — ASPIRIN 325 MG
325 TABLET ORAL ONCE
Status: COMPLETED | OUTPATIENT
Start: 2021-12-17 | End: 2021-12-18

## 2021-12-17 RX ORDER — PANTOPRAZOLE SODIUM 40 MG/1
40 TABLET, DELAYED RELEASE ORAL DAILY
Refills: 1 | Status: DISCONTINUED | OUTPATIENT
Start: 2021-12-18 | End: 2021-12-19 | Stop reason: HOSPADM

## 2021-12-17 RX ORDER — TALC
6 POWDER (GRAM) TOPICAL NIGHTLY PRN
Status: DISCONTINUED | OUTPATIENT
Start: 2021-12-17 | End: 2021-12-19 | Stop reason: HOSPADM

## 2021-12-17 RX ORDER — LEVOTHYROXINE SODIUM 75 UG/1
75 TABLET ORAL DAILY
Status: DISCONTINUED | OUTPATIENT
Start: 2021-12-18 | End: 2021-12-19 | Stop reason: HOSPADM

## 2021-12-17 RX ORDER — ACETAMINOPHEN 500 MG
1000 TABLET ORAL EVERY 6 HOURS PRN
Status: DISCONTINUED | OUTPATIENT
Start: 2021-12-17 | End: 2021-12-17

## 2021-12-17 RX ORDER — ONDANSETRON 2 MG/ML
4 INJECTION INTRAMUSCULAR; INTRAVENOUS EVERY 6 HOURS PRN
Status: DISCONTINUED | OUTPATIENT
Start: 2021-12-17 | End: 2021-12-19 | Stop reason: HOSPADM

## 2021-12-17 RX ORDER — ATORVASTATIN CALCIUM 40 MG/1
40 TABLET, FILM COATED ORAL NIGHTLY
Status: DISCONTINUED | OUTPATIENT
Start: 2021-12-17 | End: 2021-12-19 | Stop reason: HOSPADM

## 2021-12-17 RX ORDER — DIPHENHYDRAMINE HCL 25 MG
25 CAPSULE ORAL EVERY 6 HOURS PRN
Status: DISCONTINUED | OUTPATIENT
Start: 2021-12-17 | End: 2021-12-19 | Stop reason: HOSPADM

## 2021-12-17 RX ORDER — SIMETHICONE 80 MG
1 TABLET,CHEWABLE ORAL 3 TIMES DAILY PRN
Status: DISCONTINUED | OUTPATIENT
Start: 2021-12-17 | End: 2021-12-19 | Stop reason: HOSPADM

## 2021-12-17 RX ORDER — SODIUM CHLORIDE 0.9 % (FLUSH) 0.9 %
10 SYRINGE (ML) INJECTION
Status: DISCONTINUED | OUTPATIENT
Start: 2021-12-17 | End: 2021-12-19 | Stop reason: HOSPADM

## 2021-12-17 RX ORDER — DILTIAZEM HYDROCHLORIDE 180 MG/1
180 CAPSULE, COATED, EXTENDED RELEASE ORAL DAILY
Status: DISCONTINUED | OUTPATIENT
Start: 2021-12-18 | End: 2021-12-19 | Stop reason: HOSPADM

## 2021-12-17 RX ORDER — ASPIRIN 325 MG
325 TABLET ORAL
Status: COMPLETED | OUTPATIENT
Start: 2021-12-17 | End: 2021-12-17

## 2021-12-17 RX ADMIN — ASPIRIN 325 MG ORAL TABLET 325 MG: 325 PILL ORAL at 06:12

## 2021-12-17 RX ADMIN — APIXABAN 2.5 MG: 2.5 TABLET, FILM COATED ORAL at 10:12

## 2021-12-17 RX ADMIN — ATORVASTATIN CALCIUM 40 MG: 40 TABLET, FILM COATED ORAL at 10:12

## 2021-12-18 LAB
ANION GAP SERPL CALCULATED.3IONS-SCNC: 15 MMOL/L (ref 7–16)
AORTIC ROOT ANNULUS: 3.3 CM
AORTIC VALVE CUSP SEPERATION: 19.4 CM
AV INDEX (PROSTH): 0.92
AV MEAN GRADIENT: 2 MMHG
AV PEAK GRADIENT: 3 MMHG
AV REGURGITATION PRESSURE HALF TIME: 517 MS
AV VALVE AREA: 2.35 CM2
AV VELOCITY RATIO: 0.75
BASOPHILS # BLD AUTO: 0.03 K/UL (ref 0–0.2)
BASOPHILS NFR BLD AUTO: 0.7 % (ref 0–1)
BSA FOR ECHO PROCEDURE: 1.59 M2
BUN SERPL-MCNC: 18 MG/DL (ref 7–18)
BUN/CREAT SERPL: 14 (ref 6–20)
CALCIUM SERPL-MCNC: 8.9 MG/DL (ref 8.5–10.1)
CHLORIDE SERPL-SCNC: 106 MMOL/L (ref 98–107)
CO2 SERPL-SCNC: 27 MMOL/L (ref 21–32)
CREAT SERPL-MCNC: 1.26 MG/DL (ref 0.55–1.02)
CV ECHO LV RWT: 0.51 CM
DIFFERENTIAL METHOD BLD: ABNORMAL
DOP CALC AO PEAK VEL: 0.8 M/S
DOP CALC AO VTI: 13 CM
DOP CALC LVOT AREA: 2.5 CM2
DOP CALC LVOT DIAMETER: 1.8 CM
DOP CALC LVOT PEAK VEL: 0.6 M/S
DOP CALC LVOT STROKE VOLUME: 30.52 CM3
DOP CALC MV VTI: 56 CM
DOP CALCLVOT PEAK VEL VTI: 12 CM
E WAVE DECELERATION TIME: 492 MSEC
ECHO EF ESTIMATED: 50 %
ECHO LV POSTERIOR WALL: 0.97 CM (ref 0.6–1.1)
EJECTION FRACTION: 50 %
EOSINOPHIL # BLD AUTO: 0.06 K/UL (ref 0–0.5)
EOSINOPHIL NFR BLD AUTO: 1.3 % (ref 1–4)
ERYTHROCYTE [DISTWIDTH] IN BLOOD BY AUTOMATED COUNT: 13.4 % (ref 11.5–14.5)
FRACTIONAL SHORTENING: 19 % (ref 28–44)
GLUCOSE SERPL-MCNC: 73 MG/DL (ref 74–106)
HCT VFR BLD AUTO: 39.1 % (ref 38–47)
HGB BLD-MCNC: 13.1 G/DL (ref 12–16)
IMM GRANULOCYTES # BLD AUTO: 0 K/UL (ref 0–0.04)
IMM GRANULOCYTES NFR BLD: 0 % (ref 0–0.4)
INTERVENTRICULAR SEPTUM: 0.8 CM (ref 0.6–1.1)
IVC OSTIUM: 0.5 CM
LEFT ATRIUM SIZE: 3.3 CM
LEFT INTERNAL DIMENSION IN SYSTOLE: 3.07 CM (ref 2.1–4)
LEFT VENTRICLE DIASTOLIC VOLUME INDEX: 39.24 ML/M2
LEFT VENTRICLE DIASTOLIC VOLUME: 62 ML
LEFT VENTRICLE MASS INDEX: 62 G/M2
LEFT VENTRICLE SYSTOLIC VOLUME INDEX: 23.4 ML/M2
LEFT VENTRICLE SYSTOLIC VOLUME: 37 ML
LEFT VENTRICULAR INTERNAL DIMENSION IN DIASTOLE: 3.8 CM (ref 3.5–6)
LEFT VENTRICULAR MASS: 98.72 G
LVOT MG: 1 MMHG
LYMPHOCYTES # BLD AUTO: 1.85 K/UL (ref 1–4.8)
LYMPHOCYTES NFR BLD AUTO: 40.5 % (ref 27–41)
MAGNESIUM SERPL-MCNC: 2.1 MG/DL (ref 1.7–2.3)
MCH RBC QN AUTO: 28.5 PG (ref 27–31)
MCHC RBC AUTO-ENTMCNC: 33.5 G/DL (ref 32–36)
MCV RBC AUTO: 85.2 FL (ref 80–96)
MONOCYTES # BLD AUTO: 0.43 K/UL (ref 0–0.8)
MONOCYTES NFR BLD AUTO: 9.4 % (ref 2–6)
MPC BLD CALC-MCNC: 9.6 FL (ref 9.4–12.4)
MV MEAN GRADIENT: 5 MMHG
MV PEAK E VEL: 1.42 M/S
MV PEAK GRADIENT: 12 MMHG
MV STENOSIS PRESSURE HALF TIME: 125 MS
MV VALVE AREA BY CONTINUITY EQUATION: 0.55 CM2
MV VALVE AREA P 1/2 METHOD: 1.76 CM2
NEUTROPHILS # BLD AUTO: 2.2 K/UL (ref 1.8–7.7)
NEUTROPHILS NFR BLD AUTO: 48.1 % (ref 53–65)
NRBC # BLD AUTO: 0 X10E3/UL
NRBC, AUTO (.00): 0 %
PISA AR MAX VEL: 3.02 M/S
PISA TR MAX VEL: 2.9 M/S
PLATELET # BLD AUTO: 229 K/UL (ref 150–400)
POTASSIUM SERPL-SCNC: 4.3 MMOL/L (ref 3.5–5.1)
RA MAJOR: 3.7 CM
RA PRESSURE: 3 MMHG
RBC # BLD AUTO: 4.59 M/UL (ref 4.2–5.4)
RIGHT VENTRICULAR END-DIASTOLIC DIMENSION: 3.1 CM
SODIUM SERPL-SCNC: 144 MMOL/L (ref 136–145)
TR MAX PG: 34 MMHG
TRICUSPID ANNULAR PLANE SYSTOLIC EXCURSION: 1.5 CM
TV REST PULMONARY ARTERY PRESSURE: 37 MMHG
WBC # BLD AUTO: 4.57 K/UL (ref 4.5–11)

## 2021-12-18 PROCEDURE — 85025 COMPLETE CBC W/AUTO DIFF WBC: CPT | Performed by: FAMILY MEDICINE

## 2021-12-18 PROCEDURE — 36415 COLL VENOUS BLD VENIPUNCTURE: CPT | Performed by: FAMILY MEDICINE

## 2021-12-18 PROCEDURE — 80048 BASIC METABOLIC PNL TOTAL CA: CPT | Performed by: FAMILY MEDICINE

## 2021-12-18 PROCEDURE — 93005 ELECTROCARDIOGRAM TRACING: CPT

## 2021-12-18 PROCEDURE — 93010 EKG 12-LEAD: ICD-10-PCS | Mod: ,,, | Performed by: INTERNAL MEDICINE

## 2021-12-18 PROCEDURE — G0378 HOSPITAL OBSERVATION PER HR: HCPCS

## 2021-12-18 PROCEDURE — 99225 PR SUBSEQUENT OBSERVATION CARE,LEVEL II: CPT | Mod: GC,,, | Performed by: INTERNAL MEDICINE

## 2021-12-18 PROCEDURE — 94761 N-INVAS EAR/PLS OXIMETRY MLT: CPT

## 2021-12-18 PROCEDURE — 99225 PR SUBSEQUENT OBSERVATION CARE,LEVEL II: ICD-10-PCS | Mod: GC,,, | Performed by: INTERNAL MEDICINE

## 2021-12-18 PROCEDURE — 83735 ASSAY OF MAGNESIUM: CPT | Performed by: FAMILY MEDICINE

## 2021-12-18 PROCEDURE — 25000003 PHARM REV CODE 250: Performed by: FAMILY MEDICINE

## 2021-12-18 PROCEDURE — 93010 ELECTROCARDIOGRAM REPORT: CPT | Mod: ,,, | Performed by: INTERNAL MEDICINE

## 2021-12-18 RX ADMIN — APIXABAN 2.5 MG: 2.5 TABLET, FILM COATED ORAL at 10:12

## 2021-12-18 RX ADMIN — PANTOPRAZOLE SODIUM 40 MG: 40 TABLET, DELAYED RELEASE ORAL at 10:12

## 2021-12-18 RX ADMIN — LEVOTHYROXINE SODIUM 75 MCG: 75 TABLET ORAL at 10:12

## 2021-12-18 RX ADMIN — ATORVASTATIN CALCIUM 40 MG: 40 TABLET, FILM COATED ORAL at 08:12

## 2021-12-18 RX ADMIN — DILTIAZEM HYDROCHLORIDE 180 MG: 180 CAPSULE, COATED, EXTENDED RELEASE ORAL at 10:12

## 2021-12-18 RX ADMIN — APIXABAN 2.5 MG: 2.5 TABLET, FILM COATED ORAL at 08:12

## 2021-12-18 RX ADMIN — ASPIRIN 325 MG ORAL TABLET 325 MG: 325 PILL ORAL at 01:12

## 2021-12-19 VITALS
DIASTOLIC BLOOD PRESSURE: 63 MMHG | RESPIRATION RATE: 18 BRPM | HEIGHT: 63 IN | HEART RATE: 77 BPM | SYSTOLIC BLOOD PRESSURE: 113 MMHG | WEIGHT: 125 LBS | BODY MASS INDEX: 22.15 KG/M2 | TEMPERATURE: 98 F | OXYGEN SATURATION: 98 %

## 2021-12-19 PROBLEM — I24.9 ACS (ACUTE CORONARY SYNDROME): Status: RESOLVED | Noted: 2021-12-17 | Resolved: 2021-12-19

## 2021-12-19 LAB
ANION GAP SERPL CALCULATED.3IONS-SCNC: 10 MMOL/L (ref 7–16)
BASOPHILS # BLD AUTO: 0.04 K/UL (ref 0–0.2)
BASOPHILS NFR BLD AUTO: 0.8 % (ref 0–1)
BUN SERPL-MCNC: 27 MG/DL (ref 7–18)
BUN/CREAT SERPL: 24 (ref 6–20)
CALCIUM SERPL-MCNC: 8.3 MG/DL (ref 8.5–10.1)
CHLORIDE SERPL-SCNC: 107 MMOL/L (ref 98–107)
CO2 SERPL-SCNC: 25 MMOL/L (ref 21–32)
CREAT SERPL-MCNC: 1.12 MG/DL (ref 0.55–1.02)
DIFFERENTIAL METHOD BLD: ABNORMAL
EOSINOPHIL # BLD AUTO: 0.16 K/UL (ref 0–0.5)
EOSINOPHIL NFR BLD AUTO: 3.2 % (ref 1–4)
ERYTHROCYTE [DISTWIDTH] IN BLOOD BY AUTOMATED COUNT: 13.3 % (ref 11.5–14.5)
GLUCOSE SERPL-MCNC: 64 MG/DL (ref 74–106)
HCT VFR BLD AUTO: 37.9 % (ref 38–47)
HGB BLD-MCNC: 12.8 G/DL (ref 12–16)
IMM GRANULOCYTES # BLD AUTO: 0.01 K/UL (ref 0–0.04)
IMM GRANULOCYTES NFR BLD: 0.2 % (ref 0–0.4)
LYMPHOCYTES # BLD AUTO: 2.24 K/UL (ref 1–4.8)
LYMPHOCYTES NFR BLD AUTO: 44.7 % (ref 27–41)
MCH RBC QN AUTO: 28.5 PG (ref 27–31)
MCHC RBC AUTO-ENTMCNC: 33.8 G/DL (ref 32–36)
MCV RBC AUTO: 84.4 FL (ref 80–96)
MONOCYTES # BLD AUTO: 0.46 K/UL (ref 0–0.8)
MONOCYTES NFR BLD AUTO: 9.2 % (ref 2–6)
MPC BLD CALC-MCNC: 9.8 FL (ref 9.4–12.4)
NEUTROPHILS # BLD AUTO: 2.1 K/UL (ref 1.8–7.7)
NEUTROPHILS NFR BLD AUTO: 41.9 % (ref 53–65)
NRBC # BLD AUTO: 0 X10E3/UL
NRBC, AUTO (.00): 0 %
PLATELET # BLD AUTO: 231 K/UL (ref 150–400)
POTASSIUM SERPL-SCNC: 3.9 MMOL/L (ref 3.5–5.1)
RBC # BLD AUTO: 4.49 M/UL (ref 4.2–5.4)
SODIUM SERPL-SCNC: 138 MMOL/L (ref 136–145)
WBC # BLD AUTO: 5.01 K/UL (ref 4.5–11)

## 2021-12-19 PROCEDURE — 36415 COLL VENOUS BLD VENIPUNCTURE: CPT

## 2021-12-19 PROCEDURE — 99217 PR OBSERVATION CARE DISCHARGE: CPT | Mod: GC,,, | Performed by: INTERNAL MEDICINE

## 2021-12-19 PROCEDURE — 25000003 PHARM REV CODE 250: Performed by: FAMILY MEDICINE

## 2021-12-19 PROCEDURE — 85025 COMPLETE CBC W/AUTO DIFF WBC: CPT

## 2021-12-19 PROCEDURE — 99214 OFFICE O/P EST MOD 30 MIN: CPT | Mod: ,,, | Performed by: INTERNAL MEDICINE

## 2021-12-19 PROCEDURE — 99217 PR OBSERVATION CARE DISCHARGE: ICD-10-PCS | Mod: GC,,, | Performed by: INTERNAL MEDICINE

## 2021-12-19 PROCEDURE — G0378 HOSPITAL OBSERVATION PER HR: HCPCS

## 2021-12-19 PROCEDURE — 80048 BASIC METABOLIC PNL TOTAL CA: CPT

## 2021-12-19 PROCEDURE — 94761 N-INVAS EAR/PLS OXIMETRY MLT: CPT

## 2021-12-19 PROCEDURE — 99214 PR OFFICE/OUTPT VISIT, EST, LEVL IV, 30-39 MIN: ICD-10-PCS | Mod: ,,, | Performed by: INTERNAL MEDICINE

## 2021-12-19 RX ORDER — ATORVASTATIN CALCIUM 40 MG/1
40 TABLET, FILM COATED ORAL NIGHTLY
Qty: 90 TABLET | Refills: 0 | Status: SHIPPED | OUTPATIENT
Start: 2021-12-19 | End: 2021-12-22 | Stop reason: SDUPTHER

## 2021-12-19 RX ORDER — DILTIAZEM HYDROCHLORIDE 180 MG/1
180 CAPSULE, COATED, EXTENDED RELEASE ORAL DAILY
Qty: 30 CAPSULE | Refills: 0 | Status: SHIPPED | OUTPATIENT
Start: 2021-12-20 | End: 2022-10-03

## 2021-12-19 RX ORDER — LEVOTHYROXINE SODIUM 75 UG/1
75 TABLET ORAL DAILY
Qty: 30 TABLET | Refills: 11 | Status: SHIPPED | OUTPATIENT
Start: 2021-12-20 | End: 2021-12-22 | Stop reason: SDUPTHER

## 2021-12-19 RX ORDER — NAPROXEN SODIUM 220 MG/1
81 TABLET, FILM COATED ORAL DAILY
Qty: 30 TABLET | Refills: 0 | Status: SHIPPED | OUTPATIENT
Start: 2021-12-19 | End: 2024-02-20

## 2021-12-19 RX ADMIN — APIXABAN 2.5 MG: 2.5 TABLET, FILM COATED ORAL at 08:12

## 2021-12-19 RX ADMIN — PANTOPRAZOLE SODIUM 40 MG: 40 TABLET, DELAYED RELEASE ORAL at 08:12

## 2021-12-19 RX ADMIN — LEVOTHYROXINE SODIUM 75 MCG: 75 TABLET ORAL at 08:12

## 2021-12-19 RX ADMIN — DILTIAZEM HYDROCHLORIDE 180 MG: 180 CAPSULE, COATED, EXTENDED RELEASE ORAL at 08:12

## 2021-12-20 ENCOUNTER — TELEPHONE (OUTPATIENT)
Dept: FAMILY MEDICINE | Facility: CLINIC | Age: 81
End: 2021-12-20
Payer: MEDICARE

## 2021-12-20 NOTE — TELEPHONE ENCOUNTER
12/20/21@420-spoke with patient nephew, Laurent. He reports pt is resting without complaints. States her appetite is good and she is drinking fluids. He denies she has complained with chest pain or shortness of breath. States she has not had to take any NTG since being home. Reviewed and discussed all discharge medications.he states she has started the asa and is taking all prescribed meds as directed.Informed of her f/u appt with DR. Rodriguez. He states she would rather see Dr. Branham. Called and changed appt provider per request. Instructed to bring all meds to visit and to call office for any questions/concerns and to seek med attn for  any new or worsening [email or web address]

## 2021-12-22 ENCOUNTER — OFFICE VISIT (OUTPATIENT)
Dept: FAMILY MEDICINE | Facility: CLINIC | Age: 81
End: 2021-12-22
Payer: MEDICARE

## 2021-12-22 VITALS
OXYGEN SATURATION: 99 % | SYSTOLIC BLOOD PRESSURE: 114 MMHG | TEMPERATURE: 97 F | WEIGHT: 117.38 LBS | BODY MASS INDEX: 20.8 KG/M2 | RESPIRATION RATE: 17 BRPM | HEART RATE: 56 BPM | DIASTOLIC BLOOD PRESSURE: 64 MMHG

## 2021-12-22 DIAGNOSIS — K21.9 GASTROESOPHAGEAL REFLUX DISEASE, UNSPECIFIED WHETHER ESOPHAGITIS PRESENT: ICD-10-CM

## 2021-12-22 DIAGNOSIS — I25.10 CORONARY ARTERY DISEASE INVOLVING NATIVE CORONARY ARTERY OF NATIVE HEART WITHOUT ANGINA PECTORIS: ICD-10-CM

## 2021-12-22 DIAGNOSIS — K82.4 GALLBLADDER POLYP: ICD-10-CM

## 2021-12-22 DIAGNOSIS — E03.9 ACQUIRED HYPOTHYROIDISM: ICD-10-CM

## 2021-12-22 DIAGNOSIS — E78.5 HYPERLIPIDEMIA, UNSPECIFIED HYPERLIPIDEMIA TYPE: Primary | ICD-10-CM

## 2021-12-22 PROCEDURE — 99495 TRANSJ CARE MGMT MOD F2F 14D: CPT | Mod: ,,, | Performed by: INTERNAL MEDICINE

## 2021-12-22 PROCEDURE — 90686 IIV4 VACC NO PRSV 0.5 ML IM: CPT | Mod: ,,, | Performed by: INTERNAL MEDICINE

## 2021-12-22 PROCEDURE — G0008 ADMIN INFLUENZA VIRUS VAC: HCPCS | Mod: ,,, | Performed by: INTERNAL MEDICINE

## 2021-12-22 PROCEDURE — 90686 FLU VACCINE (QUAD) GREATER THAN OR EQUAL TO 3YO PRESERVATIVE FREE IM: ICD-10-PCS | Mod: ,,, | Performed by: INTERNAL MEDICINE

## 2021-12-22 PROCEDURE — 99495 TCM SERVICES (MODERATE COMPLEXITY): ICD-10-PCS | Mod: ,,, | Performed by: INTERNAL MEDICINE

## 2021-12-22 PROCEDURE — G0008 FLU VACCINE (QUAD) GREATER THAN OR EQUAL TO 3YO PRESERVATIVE FREE IM: ICD-10-PCS | Mod: ,,, | Performed by: INTERNAL MEDICINE

## 2021-12-22 RX ORDER — LEVOTHYROXINE SODIUM 75 UG/1
75 TABLET ORAL DAILY
Qty: 90 TABLET | Refills: 1 | Status: SHIPPED | OUTPATIENT
Start: 2021-12-22 | End: 2022-10-03 | Stop reason: SDUPTHER

## 2021-12-22 RX ORDER — ATORVASTATIN CALCIUM 40 MG/1
40 TABLET, FILM COATED ORAL NIGHTLY
Qty: 90 TABLET | Refills: 1 | Status: SHIPPED | OUTPATIENT
Start: 2021-12-22 | End: 2022-10-03 | Stop reason: SDUPTHER

## 2021-12-22 RX ORDER — OMEPRAZOLE 20 MG/1
20 TABLET, DELAYED RELEASE ORAL EVERY MORNING
Qty: 90 TABLET | Refills: 1 | Status: SHIPPED | OUTPATIENT
Start: 2021-12-22 | End: 2022-05-09 | Stop reason: ALTCHOICE

## 2021-12-23 LAB
CHOLEST SERPL-MCNC: 111 MG/DL (ref 0–200)
CHOLEST/HDLC SERPL: 2.3 {RATIO}
HDLC SERPL-MCNC: 49 MG/DL (ref 40–60)
LDLC SERPL CALC-MCNC: 51 MG/DL
LDLC/HDLC SERPL: 1 {RATIO}
NONHDLC SERPL-MCNC: 62 MG/DL
TRIGL SERPL-MCNC: 57 MG/DL (ref 35–150)
VLDLC SERPL-MCNC: 11 MG/DL

## 2022-03-05 ENCOUNTER — HOSPITAL ENCOUNTER (EMERGENCY)
Facility: HOSPITAL | Age: 82
Discharge: HOME | End: 2022-03-05
Attending: EMERGENCY MEDICINE
Payer: MEDICARE

## 2022-03-05 VITALS
OXYGEN SATURATION: 98 % | WEIGHT: 118 LBS | HEIGHT: 63 IN | HEART RATE: 81 BPM | DIASTOLIC BLOOD PRESSURE: 90 MMHG | RESPIRATION RATE: 15 BRPM | TEMPERATURE: 98 F | BODY MASS INDEX: 20.91 KG/M2 | SYSTOLIC BLOOD PRESSURE: 129 MMHG

## 2022-03-05 DIAGNOSIS — F03.90 DEMENTIA WITHOUT BEHAVIORAL DISTURBANCE: ICD-10-CM

## 2022-03-05 DIAGNOSIS — Z86.73 HISTORY OF CVA (CEREBROVASCULAR ACCIDENT): ICD-10-CM

## 2022-03-05 DIAGNOSIS — I63.9 CEREBROVASCULAR ACCIDENT (CVA): ICD-10-CM
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
## 2022-03-05 DIAGNOSIS — R07.89 OTHER CHEST PAIN: ICD-10-CM

## 2022-03-05 DIAGNOSIS — R26.9 ABNORMAL GAIT: ICD-10-CM

## 2022-03-05 DIAGNOSIS — I25.10 CORONARY ARTERY DISEASE INVOLVING NATIVE CORONARY ARTERY OF NATIVE HEART: ICD-10-CM

## 2022-03-05 DIAGNOSIS — I48.91 ATRIAL FIBRILLATION: ICD-10-CM

## 2022-03-05 DIAGNOSIS — E78.5 HYPERLIPIDEMIA: ICD-10-CM

## 2022-03-05 DIAGNOSIS — I10 HYPERTENSION: ICD-10-CM

## 2022-03-05 DIAGNOSIS — R07.9 CHEST PAIN: Primary | ICD-10-CM

## 2022-03-05 LAB
ALBUMIN SERPL BCP-MCNC: 4 G/DL (ref 3.5–5)
ALBUMIN/GLOB SERPL: 1.4 {RATIO}
ALP SERPL-CCNC: 120 U/L (ref 55–142)
ALT SERPL W P-5'-P-CCNC: 37 U/L (ref 13–56)
ANION GAP SERPL CALCULATED.3IONS-SCNC: 12 MMOL/L (ref 7–16)
AST SERPL W P-5'-P-CCNC: 30 U/L (ref 15–37)
BASOPHILS # BLD AUTO: 0.04 K/UL (ref 0–0.2)
BASOPHILS NFR BLD AUTO: 0.7 % (ref 0–1)
BILIRUB SERPL-MCNC: 0.4 MG/DL (ref 0–1.2)
BUN SERPL-MCNC: 30 MG/DL (ref 7–18)
BUN/CREAT SERPL: 29 (ref 6–20)
CALCIUM SERPL-MCNC: 9.1 MG/DL (ref 8.5–10.1)
CHLORIDE SERPL-SCNC: 103 MMOL/L (ref 98–107)
CO2 SERPL-SCNC: 26 MMOL/L (ref 21–32)
CREAT SERPL-MCNC: 1.02 MG/DL (ref 0.55–1.02)
DIFFERENTIAL METHOD BLD: ABNORMAL
EOSINOPHIL # BLD AUTO: 0.09 K/UL (ref 0–0.5)
EOSINOPHIL NFR BLD AUTO: 1.7 % (ref 1–4)
ERYTHROCYTE [DISTWIDTH] IN BLOOD BY AUTOMATED COUNT: 14.6 % (ref 11.5–14.5)
GLOBULIN SER-MCNC: 2.8 G/DL (ref 2–4)
GLUCOSE SERPL-MCNC: 100 MG/DL (ref 74–106)
HCT VFR BLD AUTO: 42.9 % (ref 38–47)
HGB BLD-MCNC: 14 G/DL (ref 12–16)
IMM GRANULOCYTES # BLD AUTO: 0.02 K/UL (ref 0–0.04)
IMM GRANULOCYTES NFR BLD: 0.4 % (ref 0–0.4)
LYMPHOCYTES # BLD AUTO: 1.85 K/UL (ref 1–4.8)
LYMPHOCYTES NFR BLD AUTO: 34.3 % (ref 27–41)
MCH RBC QN AUTO: 28.2 PG (ref 27–31)
MCHC RBC AUTO-ENTMCNC: 32.6 G/DL (ref 32–36)
MCV RBC AUTO: 86.5 FL (ref 80–96)
MONOCYTES # BLD AUTO: 0.41 K/UL (ref 0–0.8)
MONOCYTES NFR BLD AUTO: 7.6 % (ref 2–6)
MPC BLD CALC-MCNC: 9.7 FL (ref 9.4–12.4)
NEUTROPHILS # BLD AUTO: 2.99 K/UL (ref 1.8–7.7)
NEUTROPHILS NFR BLD AUTO: 55.3 % (ref 53–65)
NRBC # BLD AUTO: 0 X10E3/UL
NRBC, AUTO (.00): 0 %
NT-PROBNP SERPL-MCNC: 1194 PG/ML (ref 1–450)
PLATELET # BLD AUTO: 236 K/UL (ref 150–400)
POTASSIUM SERPL-SCNC: 4 MMOL/L (ref 3.5–5.1)
PROT SERPL-MCNC: 6.8 G/DL (ref 6.4–8.2)
RBC # BLD AUTO: 4.96 M/UL (ref 4.2–5.4)
SODIUM SERPL-SCNC: 137 MMOL/L (ref 136–145)
TROPONIN I SERPL HS-MCNC: 93.4 PG/ML
TROPONIN I SERPL HS-MCNC: 94.2 PG/ML
WBC # BLD AUTO: 5.4 K/UL (ref 4.5–11)

## 2022-03-05 PROCEDURE — 25000003 PHARM REV CODE 250: Performed by: EMERGENCY MEDICINE

## 2022-03-05 PROCEDURE — 85025 COMPLETE CBC W/AUTO DIFF WBC: CPT | Performed by: EMERGENCY MEDICINE

## 2022-03-05 PROCEDURE — 99283 PR EMERGENCY DEPT VISIT,LEVEL III: ICD-10-PCS | Mod: ,,, | Performed by: EMERGENCY MEDICINE

## 2022-03-05 PROCEDURE — 80053 COMPREHEN METABOLIC PANEL: CPT | Performed by: EMERGENCY MEDICINE

## 2022-03-05 PROCEDURE — 84484 ASSAY OF TROPONIN QUANT: CPT | Mod: 59 | Performed by: EMERGENCY MEDICINE

## 2022-03-05 PROCEDURE — 93010 ELECTROCARDIOGRAM REPORT: CPT | Mod: ,,, | Performed by: INTERNAL MEDICINE

## 2022-03-05 PROCEDURE — 36415 COLL VENOUS BLD VENIPUNCTURE: CPT | Performed by: EMERGENCY MEDICINE

## 2022-03-05 PROCEDURE — 83880 ASSAY OF NATRIURETIC PEPTIDE: CPT | Performed by: EMERGENCY MEDICINE

## 2022-03-05 PROCEDURE — 93005 ELECTROCARDIOGRAM TRACING: CPT

## 2022-03-05 PROCEDURE — 93010 EKG 12-LEAD: ICD-10-PCS | Mod: ,,, | Performed by: INTERNAL MEDICINE

## 2022-03-05 PROCEDURE — 99285 EMERGENCY DEPT VISIT HI MDM: CPT | Mod: 25

## 2022-03-05 PROCEDURE — 99283 EMERGENCY DEPT VISIT LOW MDM: CPT | Mod: ,,, | Performed by: EMERGENCY MEDICINE

## 2022-03-05 RX ORDER — ASPIRIN 325 MG
325 TABLET ORAL
Status: COMPLETED | OUTPATIENT
Start: 2022-03-05 | End: 2022-03-05

## 2022-03-05 RX ADMIN — ASPIRIN 325 MG ORAL TABLET 325 MG: 325 PILL ORAL at 07:03

## 2022-03-06 NOTE — ED PROVIDER NOTES
"Encounter Date: 3/5/2022    SCRIBE #1 NOTE: I, Mirna Porter, am scribing for, and in the presence of,  Sam Garcia MD. I have scribed the entire note.       History     Chief Complaint   Patient presents with    Chest Pain     Pt c/o chest pain and "not feeling good" for approx 1 day.     The patient is an 81 year old female with complaints of weakness and chest tightness. She reports the symptoms have been constant for 2 weeks. A relative reports she came to the ED tonight because she just recently told a family member about the issues. She denies any history of hypertension. 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 The patient has a history of Afib as well. She states her mother was 60 when she had her first MI.    The history is provided by the patient and a relative. No  was used.     Review of patient's allergies indicates:  No Known Allergies  Past Medical History:   Diagnosis Date    Atrial fibrillation     Coronary artery disease     CVA (cerebral vascular accident)     2021    GERD (gastroesophageal reflux disease)     Hyperlipidemia     Hypertension     Hypothyroidism     Pulmonary emboli     PVD (peripheral vascular disease)     Vitamin D deficiency      Past Surgical History:   Procedure Laterality Date    APPENDECTOMY      CARDIAC CATHETERIZATION Left 04/2013    CATARACT EXTRACTION       Family History   Problem Relation Age of Onset    Diabetes Mother     Hypertension Mother     Heart disease Mother     Cancer Mother     Heart disease Father     Hypertension Father     Parkinsonism Sister     Alcohol abuse Daughter      Social History     Tobacco Use    Smoking status: Never Smoker    Smokeless tobacco: Never Used   Substance Use Topics    Alcohol use: Never    Drug use: Never     Review of Systems   Constitutional: Negative for fever.   Respiratory: Positive for chest " tightness.    Neurological: Positive for weakness.   All other systems reviewed and are negative.      Physical Exam     Initial Vitals [03/05/22 1916]   BP Pulse Resp Temp SpO2   (!) 152/81 85 17 97.5 °F (36.4 °C) 96 %      MAP       --         Physical Exam    Constitutional: She appears well-developed and well-nourished.   HENT:   Head: Normocephalic and atraumatic.   Eyes: Conjunctivae and EOM are normal. Pupils are equal, round, and reactive to light.   Neck: Neck supple.   Normal range of motion.  Cardiovascular: Normal rate.   Pulmonary/Chest: Breath sounds normal. No respiratory distress.   Musculoskeletal:         General: No edema. Normal range of motion.      Cervical back: Normal range of motion and neck supple.     Neurological: She is alert and oriented to person, place, and time.   Skin: Skin is warm and dry.   Psychiatric: She has a normal mood and affect. Thought content normal.         ED Course   Procedures  Labs Reviewed   COMPREHENSIVE METABOLIC PANEL - Abnormal; Notable for the following components:       Result Value    BUN 30 (*)     BUN/Creatinine Ratio 29 (*)     eGFR 55 (*)     All other components within normal limits   TROPONIN I - Abnormal; Notable for the following components:    Troponin I High Sensitivity 93.4 (*)     All other components within normal limits   NT-PRO NATRIURETIC PEPTIDE - Abnormal; Notable for the following components:    ProBNP 1,194 (*)     All other components within normal limits   CBC WITH DIFFERENTIAL - Abnormal; Notable for the following components:    RDW 14.6 (*)     Monocytes % 7.6 (*)     All other components within normal limits   TROPONIN I - Abnormal; Notable for the following components:    Troponin I High Sensitivity 94.2 (*)     All other components within normal limits   CBC W/ AUTO DIFFERENTIAL    Narrative:     The following orders were created for panel order CBC auto differential.  Procedure                               Abnormality          Status                     ---------                               -----------         ------                     CBC with Differential[[id number]]        Abnormal            Final result                 Please view results for these tests on the individual orders.   EXTRA TUBES    Narrative:     The following orders were created for panel order EXTRA TUBES.  Procedure                               Abnormality         Status                     ---------                               -----------         ------                     Light Blue Top Hold[[id number]]                              In process                 Light Green Top Hold[[id number]]                             In process                 Lavender Top Hold[[id number]]                                In process                 Gold Top Hold[[id number]]                                    In process                   Please view results for these tests on the individual orders.   LIGHT BLUE TOP HOLD   LIGHT GREEN TOP HOLD   LAVENDER TOP HOLD   GOLD TOP HOLD     EKG Readings: (Independently Interpreted)   Initial Reading: No STEMI. Rhythm: Atrial Fibrillation. Heart Rate: 88. Ectopy: PVCs.   Interpreted by  at 19:35     ECG Results          EKG 12-lead (In process)  Result time 03/06/22 08:47:08    In process by Interface, Lab In [facility] (03/06/22 08:47:08)                 Narrative:    Test Reason : R07.9,    Vent. Rate : 088 BPM     Atrial Rate : 000 BPM     P-R Int : 000 ms          QRS Dur : 070 ms      QT Int : 376 ms       P-R-T Axes : 000 002 065 degrees     QTc Int : 425 ms    Atrial fibrillation  with PVC(s)  or aberrant ventricular conduction  Q in V1/V2 may be normal variant but septal infarct cannot be excluded  Abnormal ECG      Referred By: [email or web address]   SELF           Confirmed By:                             Imaging Results          X-Ray Chest AP Portable (Final result)  Result time 03/05/22 20:04:21    Final result by Rd GALO  MD Yanely (03/05/22 20:04:21)                 Impression:      No acute cardiopulmonary process      Electronically signed by: Rd Ny  Date:    03/05/2022  Time:    20:04             Narrative:    EXAMINATION:  XR CHEST AP PORTABLE    CLINICAL HISTORY:  Chest pain;.    COMPARISON:  December 17, 2021 chest x-ray    TECHNIQUE:  AP portable erect chest    FINDINGS:  Cardiac silhouette is upper normal    There is no mediastinal mass.  There is another pulmonary vascular engorgement.    Lungs are generally clear for shallow breath.  There is no gross pleural effusion.    Osseous structures are unchanged                                 Medications   aspirin tablet 325 mg (325 mg Oral Given 3/5/22 1947)               Attending Attestation:           Physician Attestation for Scribe:  Physician Attestation Statement for Scribe #1: I, Sam Garcia MD, reviewed documentation, as scribed by Mirna Porter in my presence, and it is both accurate and complete.             ED Course as of 03/07/22 0414   Sat Mar 05, 2022   2107 Troponin I(!!) [WB]   2108 Troponin I High Sensitivity(!!): 94.2 [WB]      ED Course User Index  [WB] Chris Naranjo IV, DO             Clinical Impression:   Final diagnoses:  [R07.9] Chest pain (Primary)          ED Disposition Condition    Discharge Stable        ED Prescriptions     None        Follow-up Information     Follow up With Specialties Details Why Contact Info    Mandeep Branham MD Internal Medicine In 3 days If symptoms worsen [address]  [facility] [place] [address]  [phone]      Isauro Tena MD Interventional Cardiology, Cardiology In 1 week For follow-up [address] [facility] [place] [address]  [phone]             Sam Garcia MD  03/07/22 0414

## 2022-03-11 DIAGNOSIS — Z71.89 COMPLEX CARE COORDINATION: ICD-10-CM

## 2022-04-30 ENCOUNTER — EXTERNAL CHRONIC CARE MANAGEMENT (OUTPATIENT)
Dept: FAMILY MEDICINE | Facility: CLINIC | Age: 82
End: 2022-04-30
Payer: MEDICARE

## 2022-04-30 PROCEDURE — G0511 PR CHRONIC CARE MGMT, RHC OR FQHC ONLY, 20 MINS OR MORE: ICD-10-PCS | Mod: ,,, | Performed by: INTERNAL MEDICINE

## 2022-04-30 PROCEDURE — G0511 CCM/BHI BY RHC/FQHC 20MIN MO: HCPCS | Mod: ,,, | Performed by: INTERNAL MEDICINE

## 2022-05-09 ENCOUNTER — OFFICE VISIT (OUTPATIENT)
Dept: CARDIOLOGY | Facility: CLINIC | Age: 82
End: 2022-05-09
Payer: MEDICARE

## 2022-05-09 VITALS
SYSTOLIC BLOOD PRESSURE: 98 MMHG | DIASTOLIC BLOOD PRESSURE: 60 MMHG | BODY MASS INDEX: 20.64 KG/M2 | RESPIRATION RATE: 16 BRPM | HEART RATE: 74 BPM | HEIGHT: 63 IN | WEIGHT: 116.5 LBS

## 2022-05-09 DIAGNOSIS — R07.89 OTHER CHEST PAIN: Primary | ICD-10-CM

## 2022-05-09 DIAGNOSIS — K21.9 GASTROESOPHAGEAL REFLUX DISEASE, UNSPECIFIED WHETHER ESOPHAGITIS PRESENT: Chronic | ICD-10-CM

## 2022-05-09 DIAGNOSIS — I48.11 LONGSTANDING PERSISTENT ATRIAL FIBRILLATION: Chronic | ICD-10-CM

## 2022-05-09 DIAGNOSIS — E78.5 HYPERLIPIDEMIA, UNSPECIFIED HYPERLIPIDEMIA TYPE: Chronic | ICD-10-CM

## 2022-05-09 DIAGNOSIS — I10 PRIMARY HYPERTENSION: Chronic | ICD-10-CM

## 2022-05-09 DIAGNOSIS — I25.10 CORONARY ARTERY DISEASE INVOLVING NATIVE CORONARY ARTERY OF NATIVE HEART WITHOUT ANGINA PECTORIS: Chronic | ICD-10-CM

## 2022-05-09 PROCEDURE — 93010 EKG 12-LEAD: ICD-10-PCS | Mod: S$PBB,,, | Performed by: INTERNAL MEDICINE

## 2022-05-09 PROCEDURE — 99214 OFFICE O/P EST MOD 30 MIN: CPT | Mod: S$PBB,,, | Performed by: INTERNAL MEDICINE

## 2022-05-09 PROCEDURE — 99214 PR OFFICE/OUTPT VISIT, EST, LEVL IV, 30-39 MIN: ICD-10-PCS | Mod: S$PBB,,, | Performed by: INTERNAL MEDICINE

## 2022-05-09 PROCEDURE — 93005 ELECTROCARDIOGRAM TRACING: CPT | Mod: PBBFAC | Performed by: INTERNAL MEDICINE

## 2022-05-09 PROCEDURE — 99214 OFFICE O/P EST MOD 30 MIN: CPT | Mod: PBBFAC | Performed by: INTERNAL MEDICINE

## 2022-05-09 PROCEDURE — 93010 ELECTROCARDIOGRAM REPORT: CPT | Mod: S$PBB,,, | Performed by: INTERNAL MEDICINE

## 2022-05-10 RX ORDER — OMEPRAZOLE 20 MG/1
20 TABLET, DELAYED RELEASE ORAL DAILY
Qty: 30 TABLET | Refills: 5 | Status: SHIPPED | OUTPATIENT
Start: 2022-05-10 | End: 2022-10-03 | Stop reason: SDUPTHER

## 2022-05-11 PROBLEM — K21.9 GASTROESOPHAGEAL REFLUX DISEASE: Chronic | Status: ACTIVE | Noted: 2022-05-11

## 2022-05-11 NOTE — PROGRESS NOTES
[facility] note        DATE OF SERVICE: 05/11/2022       PCP: Mandeep Branham MD      CHIEF COMPLAINT:   Chief Complaint   Patient presents with    Follow-up     6 Months    Edema     Bilateral lower extremities        HISTORY OF PRESENT ILLNESS:  Abdias Carver is a 81 y.o. female with a PMH of   Past Medical History:   Diagnosis Date    Atrial fibrillation     Coronary artery disease     CVA (cerebral vascular accident)     2021    GERD (gastroesophageal reflux disease)     Hyperlipidemia     Hypertension     Hypothyroidism     Pulmonary emboli     PVD (peripheral vascular disease)     Vitamin D deficiency      who presents for   Chief Complaint   Patient presents with    Follow-up     6 Months    Edema     Bilateral lower extremities          Review of Systems: Review of Systems   Respiratory: Negative for shortness of breath.    Cardiovascular: Positive for leg swelling. Negative for chest pain and palpitations.   Neurological: Negative for loss of consciousness.        PAST MEDICAL HISTORY:  Past Medical History:   Diagnosis Date    Atrial fibrillation     Coronary artery disease     CVA (cerebral vascular accident)     2021    GERD (gastroesophageal reflux disease)     Hyperlipidemia     Hypertension     Hypothyroidism     Pulmonary emboli     PVD (peripheral vascular disease)     Vitamin D deficiency        PAST SURGICAL HISTORY:  Past Surgical History:   Procedure Laterality Date    APPENDECTOMY      CARDIAC CATHETERIZATION Left 04/2013    CATARACT EXTRACTION         SOCIAL HISTORY:  Social History     Socioeconomic History    Marital status:    Tobacco Use    Smoking status: Never Smoker    Smokeless tobacco: Never Used   Substance and Sexual Activity    Alcohol use: Never    Drug use: Never    Sexual activity: Not Currently       FAMILY HISTORY:  Family History   Problem Relation Age of Onset    Diabetes Mother     Hypertension Mother     Heart  "disease Mother     Cancer Mother     Heart disease Father     Hypertension Father     Parkinsonism Sister     Alcohol abuse Daughter          ALLERGIES:  Review of patient's allergies indicates:  No Known Allergies     MEDICATIONS:    Current Outpatient Medications:     apixaban (ELIQUIS) 2.5 mg Tab, Take 1 tablet (2.5 mg total) by mouth 2 (two) times daily. (Patient taking differently: Take 5 mg by mouth 2 (two) times daily.), Disp: 60 tablet, Rfl: 11    aspirin 81 MG Chew, Take 1 tablet (81 mg total) by mouth once daily., Disp: 30 tablet, Rfl: 0    diltiaZEM (CARDIZEM CD) 180 MG 24 hr capsule, Take 1 capsule (180 mg total) by mouth once daily., Disp: 30 capsule, Rfl: 0    levothyroxine (SYNTHROID) 75 MCG tablet, Take 1 tablet (75 mcg total) by mouth once daily., Disp: 90 tablet, Rfl: 1    nitroGLYCERIN (NITROSTAT) 0.4 MG SL tablet, Place 1 tablet (0.4 mg total) under the tongue every 5 (five) minutes as needed for Chest pain. Put 1 tablet under your tongue as needed for chest pain; may take up to 3 doses 5 minutes apart. If no resolutiion of CP go to the nearest ED for evaluation., Disp: 25 tablet, Rfl: 3    atorvastatin (LIPITOR) 40 MG tablet, Take 1 tablet (40 mg total) by mouth every evening., Disp: 90 tablet, Rfl: 1    ergocalciferol (ERGOCALCIFEROL) 50,000 unit Cap, Take one tablet weekly for 12 weeks then reduce to one capsule monthly, Disp: 12 capsule, Rfl: 1    omeprazole (PRILOSEC OTC) 20 MG tablet, Take 1 tablet (20 mg total) by mouth once daily., Disp: 30 tablet, Rfl: 5     PHYSICAL EXAM:  BP 98/60 (BP Location: Left arm, Patient Position: Sitting)   Pulse 74   Resp 16   Ht 5' 3" (1.6 m)   Wt 52.8 kg (116 lb 8 oz)   BMI 20.64 kg/m²   Wt Readings from Last 3 Encounters:   05/09/22 52.8 kg (116 lb 8 oz)   03/05/22 53.5 kg (118 lb)   12/22/21 53.3 kg (117 lb 6.4 oz)      Body mass index is 20.64 kg/m².    Physical Exam  Vitals reviewed.   Constitutional:       Appearance: Normal " appearance.   HENT:      Head: Normocephalic and atraumatic.   Neck:      Vascular: No carotid bruit or JVD.   Cardiovascular:      Rate and Rhythm: Normal rate. Rhythm irregularly irregular.      Pulses: Normal pulses.           Radial pulses are 2+ on the right side and 2+ on the left side.      Heart sounds: Normal heart sounds.   Pulmonary:      Effort: Pulmonary effort is normal.      Breath sounds: Normal breath sounds.   Musculoskeletal:      Right lower leg: No edema.      Left lower leg: No edema.   Skin:     General: Skin is warm and dry.   Neurological:      Mental Status: She is alert.         LABS REVIEWED:  Lab Results   Component Value Date    WBC 5.40 03/05/2022    RBC 4.96 03/05/2022    HGB 14.0 03/05/2022    HCT 42.9 03/05/2022    MCV 86.5 03/05/2022    MCH 28.2 03/05/2022    MCHC 32.6 03/05/2022    RDW 14.6 (H) 03/05/2022     03/05/2022    MPV 9.7 03/05/2022    NRBC 0.0 03/05/2022    INR 1.31 (H) 12/17/2021     Lab Results   Component Value Date     03/05/2022    K 4.0 03/05/2022     03/05/2022    CO2 26 03/05/2022    BUN 30 (H) 03/05/2022    MG 2.1 12/18/2021     Lab Results   Component Value Date    CPK 65 01/05/2021    AST 30 03/05/2022    ALT 37 03/05/2022     Lab Results   Component Value Date     03/05/2022     Lab Results   Component Value Date    CHOL 111 12/17/2021    HDL 49 12/17/2021    TRIG 57 12/17/2021    CHOLHDL 2.3 12/17/2021       CARDIAC STUDIES REVIEWED:EKG: a-fib, low voltage QRS, 64 bpm      ASSESSMENT:   Active Problem List with Overview Notes    Diagnosis Date Noted    Gastroesophageal reflux disease 05/11/2022    History of CVA (cerebrovascular accident) 10/18/2021    Other chest pain 09/07/2021     atypical for angina with no ischemia on Lexiscan      Cerebrovascular accident (CVA) 08/13/2021    Abnormal gait 08/13/2021    Fall 08/13/2021    Dementia without behavioral disturbance 08/13/2021    Coronary artery disease involving native  coronary artery of native heart 06/10/2021     Mild, nonobstructive CAD [facility] 4/2/2013      Atrial fibrillation      Paroxysmal afib; on Eliquis for cva prophylaxis      Hyperlipidemia     Hypertension      VISIT DIAGNOSIS:  Other chest pain  Comments:  atypical  Orders:  -     Ambulatory referral/consult to Gastroenterology; Future; Expected date: 08/11/2022    Longstanding persistent atrial fibrillation  -     EKG 12-lead; Future    Gastroesophageal reflux disease, unspecified whether esophagitis present  -     Ambulatory referral/consult to Gastroenterology; Future; Expected date: 08/11/2022    Primary hypertension    Hyperlipidemia, unspecified hyperlipidemia type    Coronary artery disease involving native coronary artery of native heart without angina pectoris  Comments:  mild, nonobstructive    Other orders  -     omeprazole (PRILOSEC OTC) 20 MG tablet; Take 1 tablet (20 mg total) by mouth once daily.  Dispense: 30 tablet; Refill: 5         PLAN: Prilosec 20 mg one po qd- pt has been out  Orders Placed This Encounter   Procedures    Ambulatory referral/consult to Gastroenterology     Standing Status:   Future     Standing Expiration Date:   11/11/2023     Referral Priority:   Routine     Referral Type:   Consultation     Referral Reason:   Specialty Services Required     Referred to Provider:   Sin Connor MD     Requested Specialty:   Gastroenterology     Number of Visits Requested:   1    EKG 12-lead     Standing Status:   Future     Number of Occurrences:   1     Standing Expiration Date:   5/9/2023      RTC 6 months or sooner if pain returns.

## 2022-05-31 ENCOUNTER — EXTERNAL CHRONIC CARE MANAGEMENT (OUTPATIENT)
Dept: FAMILY MEDICINE | Facility: CLINIC | Age: 82
End: 2022-05-31
Payer: MEDICARE

## 2022-05-31 PROCEDURE — G0511 CCM/BHI BY RHC/FQHC 20MIN MO: HCPCS | Mod: ,,, | Performed by: INTERNAL MEDICINE

## 2022-05-31 PROCEDURE — G0511 PR CHRONIC CARE MGMT, RHC OR FQHC ONLY, 20 MINS OR MORE: ICD-10-PCS | Mod: ,,, | Performed by: INTERNAL MEDICINE

## 2022-06-07 ENCOUNTER — HOSPITAL ENCOUNTER (EMERGENCY)
Facility: HOSPITAL | Age: 82
Discharge: HOME | End: 2022-06-08
Attending: EMERGENCY MEDICINE
Payer: MEDICARE

## 2022-06-07 DIAGNOSIS — I48.91 RAPID ATRIAL FIBRILLATION: ICD-10-CM

## 2022-06-07 DIAGNOSIS — R00.2 PALPITATIONS: Primary | ICD-10-CM

## 2022-06-07 LAB
ALBUMIN SERPL BCP-MCNC: 3.9 G/DL (ref 3.5–5)
ALBUMIN/GLOB SERPL: 1.3 {RATIO}
ALP SERPL-CCNC: 96 U/L (ref 55–142)
ALT SERPL W P-5'-P-CCNC: 27 U/L (ref 13–56)
ANION GAP SERPL CALCULATED.3IONS-SCNC: 13 MMOL/L (ref 7–16)
AST SERPL W P-5'-P-CCNC: 25 U/L (ref 15–37)
BASOPHILS # BLD AUTO: 0.03 K/UL (ref 0–0.2)
BASOPHILS NFR BLD AUTO: 0.5 % (ref 0–1)
BILIRUB SERPL-MCNC: 0.5 MG/DL (ref 0–1.2)
BUN SERPL-MCNC: 25 MG/DL (ref 7–18)
BUN/CREAT SERPL: 22 (ref 6–20)
CALCIUM SERPL-MCNC: 9.2 MG/DL (ref 8.5–10.1)
CHLORIDE SERPL-SCNC: 105 MMOL/L (ref 98–107)
CO2 SERPL-SCNC: 26 MMOL/L (ref 21–32)
CREAT SERPL-MCNC: 1.14 MG/DL (ref 0.55–1.02)
DIFFERENTIAL METHOD BLD: ABNORMAL
EOSINOPHIL # BLD AUTO: 0.08 K/UL (ref 0–0.5)
EOSINOPHIL NFR BLD AUTO: 1.4 % (ref 1–4)
ERYTHROCYTE [DISTWIDTH] IN BLOOD BY AUTOMATED COUNT: 14.4 % (ref 11.5–14.5)
GLOBULIN SER-MCNC: 3 G/DL (ref 2–4)
GLUCOSE SERPL-MCNC: 94 MG/DL (ref 74–106)
HCT VFR BLD AUTO: 43 % (ref 38–47)
HGB BLD-MCNC: 14.5 G/DL (ref 12–16)
IMM GRANULOCYTES # BLD AUTO: 0.02 K/UL (ref 0–0.04)
IMM GRANULOCYTES NFR BLD: 0.3 % (ref 0–0.4)
LYMPHOCYTES # BLD AUTO: 1.9 K/UL (ref 1–4.8)
LYMPHOCYTES NFR BLD AUTO: 32.2 % (ref 27–41)
MAGNESIUM SERPL-MCNC: 1.9 MG/DL (ref 1.7–2.3)
MCH RBC QN AUTO: 29.5 PG (ref 27–31)
MCHC RBC AUTO-ENTMCNC: 33.7 G/DL (ref 32–36)
MCV RBC AUTO: 87.6 FL (ref 80–96)
MONOCYTES # BLD AUTO: 0.45 K/UL (ref 0–0.8)
MONOCYTES NFR BLD AUTO: 7.6 % (ref 2–6)
MPC BLD CALC-MCNC: 9.7 FL (ref 9.4–12.4)
NEUTROPHILS # BLD AUTO: 3.42 K/UL (ref 1.8–7.7)
NEUTROPHILS NFR BLD AUTO: 58 % (ref 53–65)
NRBC # BLD AUTO: 0 X10E3/UL
NRBC, AUTO (.00): 0 %
PLATELET # BLD AUTO: 240 K/UL (ref 150–400)
POTASSIUM SERPL-SCNC: 3.9 MMOL/L (ref 3.5–5.1)
PROT SERPL-MCNC: 6.9 G/DL (ref 6.4–8.2)
RBC # BLD AUTO: 4.91 M/UL (ref 4.2–5.4)
SODIUM SERPL-SCNC: 140 MMOL/L (ref 136–145)
TROPONIN I SERPL HS-MCNC: 90.2 PG/ML
TROPONIN I SERPL HS-MCNC: 91.6 PG/ML
WBC # BLD AUTO: 5.9 K/UL (ref 4.5–11)

## 2022-06-07 PROCEDURE — 93010 ELECTROCARDIOGRAM REPORT: CPT | Mod: ,,, | Performed by: INTERNAL MEDICINE

## 2022-06-07 PROCEDURE — 99283 PR EMERGENCY DEPT VISIT,LEVEL III: ICD-10-PCS | Mod: ,,, | Performed by: EMERGENCY MEDICINE

## 2022-06-07 PROCEDURE — 36415 COLL VENOUS BLD VENIPUNCTURE: CPT | Performed by: EMERGENCY MEDICINE

## 2022-06-07 PROCEDURE — 96374 THER/PROPH/DIAG INJ IV PUSH: CPT

## 2022-06-07 PROCEDURE — 85025 COMPLETE CBC W/AUTO DIFF WBC: CPT | Performed by: EMERGENCY MEDICINE

## 2022-06-07 PROCEDURE — 99283 EMERGENCY DEPT VISIT LOW MDM: CPT | Mod: ,,, | Performed by: EMERGENCY MEDICINE

## 2022-06-07 PROCEDURE — 99285 EMERGENCY DEPT VISIT HI MDM: CPT | Mod: 25

## 2022-06-07 PROCEDURE — 83735 ASSAY OF MAGNESIUM: CPT | Performed by: EMERGENCY MEDICINE

## 2022-06-07 PROCEDURE — 93005 ELECTROCARDIOGRAM TRACING: CPT

## 2022-06-07 PROCEDURE — 93010 EKG 12-LEAD: ICD-10-PCS | Mod: ,,, | Performed by: INTERNAL MEDICINE

## 2022-06-07 PROCEDURE — 80053 COMPREHEN METABOLIC PANEL: CPT | Performed by: EMERGENCY MEDICINE

## 2022-06-07 PROCEDURE — 84484 ASSAY OF TROPONIN QUANT: CPT | Mod: 91 | Performed by: EMERGENCY MEDICINE

## 2022-06-08 ENCOUNTER — TELEPHONE (OUTPATIENT)
Dept: EMERGENCY MEDICINE | Facility: HOSPITAL | Age: 82
End: 2022-06-08
Payer: MEDICARE

## 2022-06-08 VITALS
DIASTOLIC BLOOD PRESSURE: 72 MMHG | TEMPERATURE: 97 F | WEIGHT: 115 LBS | RESPIRATION RATE: 17 BRPM | SYSTOLIC BLOOD PRESSURE: 159 MMHG | BODY MASS INDEX: 20.38 KG/M2 | OXYGEN SATURATION: 96 % | HEIGHT: 63 IN | HEART RATE: 93 BPM

## 2022-06-08 PROBLEM — R00.2 PALPITATIONS: Status: ACTIVE | Noted: 2022-06-08

## 2022-06-08 PROCEDURE — 25000003 PHARM REV CODE 250: Performed by: EMERGENCY MEDICINE

## 2022-06-08 RX ORDER — METOPROLOL TARTRATE 1 MG/ML
5 INJECTION, SOLUTION INTRAVENOUS
Status: COMPLETED | OUTPATIENT
Start: 2022-06-08 | End: 2022-06-08

## 2022-06-08 RX ADMIN — METOPROLOL TARTRATE 5 MG: 5 INJECTION, SOLUTION INTRAVENOUS at 12:06

## 2022-06-08 NOTE — ED TRIAGE NOTES
"Pt in with cc of feeling like her "heart was moving" since 12:00, pt states she has no heart problems   "

## 2022-06-08 NOTE — ED PROVIDER NOTES
Encounter Date: 6/7/2022       History     Chief Complaint   Patient presents with    Palpitations     80 Y/O FEMALE WITH A HISTORY OF ATRIAL FIBRILLATION WHO HAS BEEN FEELING PALPITATIONS SINCE YESTERDAY SAYS THEY HAVE GOTTEN WORSE TODAY.  SHE REPORTS MILD SHORTNESS OF BREATH.  SHE DENIES ACTUAL CHEST PAIN.  SHE SAYS SHE FEELS BETTER AFTER RECEIVING IV LOPRESSOR HERE THIS EVENING.          Review of patient's allergies indicates:  No Known Allergies  Past Medical History:   Diagnosis Date    Atrial fibrillation     Coronary artery disease     CVA (cerebral vascular accident)     2021    GERD (gastroesophageal reflux disease)     Hyperlipidemia     Hypertension     Hypothyroidism     Pulmonary emboli     PVD (peripheral vascular disease)     Vitamin D deficiency      Past Surgical History:   Procedure Laterality Date    APPENDECTOMY      CARDIAC CATHETERIZATION Left 04/2013    CATARACT EXTRACTION       Family History   Problem Relation Age of Onset    Diabetes Mother     Hypertension Mother     Heart disease Mother     Cancer Mother     Heart disease Father     Hypertension Father     Parkinsonism Sister     Alcohol abuse Daughter      Social History     Tobacco Use    Smoking status: Never Smoker    Smokeless tobacco: Never Used   Substance Use Topics    Alcohol use: Never    Drug use: Never     Review of Systems   All other systems reviewed and are negative.      Physical Exam     Initial Vitals [06/07/22 2127]   BP Pulse Resp Temp SpO2   (!) 145/79 101 14 97.1 °F (36.2 °C) 98 %      MAP       --         Physical Exam    Nursing note and vitals reviewed.  Constitutional: She appears well-developed and well-nourished.   HENT:   Head: Normocephalic and atraumatic.   Nose: Nose normal.   Mouth/Throat: Oropharynx is clear and moist.   Eyes: Conjunctivae and EOM are normal. Pupils are equal, round, and reactive to light.   Neck: Neck supple.   Normal range of motion.  Cardiovascular:    IRREGULARLY IRREGULAR TACHYCARDIA   Pulmonary/Chest: Breath sounds normal.   Abdominal: Abdomen is soft. Bowel sounds are normal.   Musculoskeletal:         General: Normal range of motion.      Cervical back: Normal range of motion and neck supple.     Neurological: She is alert and oriented to person, place, and time. She has normal strength. GCS score is 15. GCS eye subscore is 4. GCS verbal subscore is 5. GCS motor subscore is 6.   Skin: Skin is warm and dry. Capillary refill takes less than 2 seconds.   Psychiatric: She has a normal mood and affect. Thought content normal.         Medical Screening Exam   See Full Note    ED Course   Procedures  Labs Reviewed   COMPREHENSIVE METABOLIC PANEL - Abnormal; Notable for the following components:       Result Value    BUN 25 (*)     Creatinine 1.14 (*)     BUN/Creatinine Ratio 22 (*)     eGFR 48 (*)     All other components within normal limits   TROPONIN I - Abnormal; Notable for the following components:    Troponin I High Sensitivity 91.6 (*)     All other components within normal limits   CBC WITH DIFFERENTIAL - Abnormal; Notable for the following components:    Monocytes % 7.6 (*)     All other components within normal limits   TROPONIN I - Abnormal; Notable for the following components:    Troponin I High Sensitivity 90.2 (*)     All other components within normal limits   MAGNESIUM - Normal   CBC W/ AUTO DIFFERENTIAL    Narrative:     The following orders were created for panel order CBC auto differential.  Procedure                               Abnormality         Status                     ---------                               -----------         ------                     CBC with Differential[[id number]]        Abnormal            Final result                 Please view results for these tests on the individual orders.          Imaging Results          X-Ray Chest AP Portable (In process)                  Medications   metoprolol injection 5 mg (5 mg  Intravenous Given 6/8/22 0024)                       Clinical Impression:   Final diagnoses:  [R00.2] Palpitations (Primary)  [I48.91] Rapid atrial fibrillation          ED Disposition Condition    Discharge Stable        ED Prescriptions     None        Follow-up Information    None          Adam Santamaria MD  06/08/22 0047

## 2022-06-30 ENCOUNTER — EXTERNAL CHRONIC CARE MANAGEMENT (OUTPATIENT)
Dept: FAMILY MEDICINE | Facility: CLINIC | Age: 82
End: 2022-06-30
Payer: MEDICARE

## 2022-06-30 PROCEDURE — G0511 PR CHRONIC CARE MGMT, RHC OR FQHC ONLY, 20 MINS OR MORE: ICD-10-PCS | Mod: ,,, | Performed by: INTERNAL MEDICINE

## 2022-06-30 PROCEDURE — G0511 CCM/BHI BY RHC/FQHC 20MIN MO: HCPCS | Mod: ,,, | Performed by: INTERNAL MEDICINE

## 2022-07-31 ENCOUNTER — EXTERNAL CHRONIC CARE MANAGEMENT (OUTPATIENT)
Dept: FAMILY MEDICINE | Facility: CLINIC | Age: 82
End: 2022-07-31
Payer: MEDICARE

## 2022-07-31 PROCEDURE — G0511 CCM/BHI BY RHC/FQHC 20MIN MO: HCPCS | Mod: ,,, | Performed by: INTERNAL MEDICINE

## 2022-07-31 PROCEDURE — G0511 PR CHRONIC CARE MGMT, RHC OR FQHC ONLY, 20 MINS OR MORE: ICD-10-PCS | Mod: ,,, | Performed by: INTERNAL MEDICINE

## 2022-08-31 ENCOUNTER — EXTERNAL CHRONIC CARE MANAGEMENT (OUTPATIENT)
Dept: FAMILY MEDICINE | Facility: CLINIC | Age: 82
End: 2022-08-31
Payer: MEDICARE

## 2022-08-31 PROCEDURE — G0511 PR CHRONIC CARE MGMT, RHC OR FQHC ONLY, 20 MINS OR MORE: ICD-10-PCS | Mod: ,,, | Performed by: INTERNAL MEDICINE

## 2022-08-31 PROCEDURE — G0511 CCM/BHI BY RHC/FQHC 20MIN MO: HCPCS | Mod: ,,, | Performed by: INTERNAL MEDICINE

## 2022-09-30 ENCOUNTER — EXTERNAL CHRONIC CARE MANAGEMENT (OUTPATIENT)
Dept: FAMILY MEDICINE | Facility: CLINIC | Age: 82
End: 2022-09-30
Payer: MEDICARE

## 2022-09-30 PROCEDURE — G0511 PR CHRONIC CARE MGMT, RHC OR FQHC ONLY, 20 MINS OR MORE: ICD-10-PCS | Mod: ,,, | Performed by: INTERNAL MEDICINE

## 2022-09-30 PROCEDURE — G0511 CCM/BHI BY RHC/FQHC 20MIN MO: HCPCS | Mod: ,,, | Performed by: INTERNAL MEDICINE

## 2022-10-03 ENCOUNTER — OFFICE VISIT (OUTPATIENT)
Dept: FAMILY MEDICINE | Facility: CLINIC | Age: 82
End: 2022-10-03
Payer: MEDICARE

## 2022-10-03 VITALS
TEMPERATURE: 97 F | DIASTOLIC BLOOD PRESSURE: 68 MMHG | BODY MASS INDEX: 20.38 KG/M2 | HEIGHT: 63 IN | HEART RATE: 124 BPM | RESPIRATION RATE: 16 BRPM | SYSTOLIC BLOOD PRESSURE: 108 MMHG | WEIGHT: 115 LBS | OXYGEN SATURATION: 94 %

## 2022-10-03 DIAGNOSIS — I48.11 LONGSTANDING PERSISTENT ATRIAL FIBRILLATION: Primary | ICD-10-CM

## 2022-10-03 DIAGNOSIS — E78.5 HYPERLIPIDEMIA, UNSPECIFIED HYPERLIPIDEMIA TYPE: ICD-10-CM

## 2022-10-03 DIAGNOSIS — K21.9 GASTROESOPHAGEAL REFLUX DISEASE, UNSPECIFIED WHETHER ESOPHAGITIS PRESENT: Chronic | ICD-10-CM

## 2022-10-03 DIAGNOSIS — E03.9 ACQUIRED HYPOTHYROIDISM: ICD-10-CM

## 2022-10-03 PROCEDURE — 93010 PR ELECTROCARDIOGRAM REPORT: ICD-10-PCS | Mod: ,,, | Performed by: INTERNAL MEDICINE

## 2022-10-03 PROCEDURE — 80048 BASIC METABOLIC PNL TOTAL CA: CPT | Mod: ,,, | Performed by: CLINICAL MEDICAL LABORATORY

## 2022-10-03 PROCEDURE — 99214 OFFICE O/P EST MOD 30 MIN: CPT | Mod: ,,, | Performed by: INTERNAL MEDICINE

## 2022-10-03 PROCEDURE — 93005 ELECTROCARDIOGRAM TRACING: CPT | Mod: RHCUB | Performed by: INTERNAL MEDICINE

## 2022-10-03 PROCEDURE — 84443 TSH: ICD-10-PCS | Mod: ,,, | Performed by: CLINICAL MEDICAL LABORATORY

## 2022-10-03 PROCEDURE — 80048 BASIC METABOLIC PANEL: ICD-10-PCS | Mod: ,,, | Performed by: CLINICAL MEDICAL LABORATORY

## 2022-10-03 PROCEDURE — 85025 CBC WITH DIFFERENTIAL: ICD-10-PCS | Mod: ,,, | Performed by: CLINICAL MEDICAL LABORATORY

## 2022-10-03 PROCEDURE — 84443 ASSAY THYROID STIM HORMONE: CPT | Mod: ,,, | Performed by: CLINICAL MEDICAL LABORATORY

## 2022-10-03 PROCEDURE — 85025 COMPLETE CBC W/AUTO DIFF WBC: CPT | Mod: ,,, | Performed by: CLINICAL MEDICAL LABORATORY

## 2022-10-03 PROCEDURE — 83735 ASSAY OF MAGNESIUM: CPT | Mod: ,,, | Performed by: CLINICAL MEDICAL LABORATORY

## 2022-10-03 PROCEDURE — 83735 MAGNESIUM: ICD-10-PCS | Mod: ,,, | Performed by: CLINICAL MEDICAL LABORATORY

## 2022-10-03 PROCEDURE — 99214 PR OFFICE/OUTPT VISIT, EST, LEVL IV, 30-39 MIN: ICD-10-PCS | Mod: ,,, | Performed by: INTERNAL MEDICINE

## 2022-10-03 PROCEDURE — 93010 ELECTROCARDIOGRAM REPORT: CPT | Mod: ,,, | Performed by: INTERNAL MEDICINE

## 2022-10-03 RX ORDER — OMEPRAZOLE 20 MG/1
20 TABLET, DELAYED RELEASE ORAL DAILY
Qty: 30 TABLET | Refills: 5 | Status: SHIPPED | OUTPATIENT
Start: 2022-10-03 | End: 2024-02-20

## 2022-10-03 RX ORDER — LEVOTHYROXINE SODIUM 75 UG/1
75 TABLET ORAL DAILY
Qty: 30 TABLET | Refills: 5 | Status: SHIPPED | OUTPATIENT
Start: 2022-10-03 | End: 2024-02-20

## 2022-10-03 RX ORDER — ATORVASTATIN CALCIUM 40 MG/1
40 TABLET, FILM COATED ORAL NIGHTLY
Qty: 30 TABLET | Refills: 5 | Status: SHIPPED | OUTPATIENT
Start: 2022-10-03 | End: 2024-02-20

## 2022-10-03 RX ORDER — METOPROLOL SUCCINATE 25 MG/1
25 TABLET, EXTENDED RELEASE ORAL DAILY
Qty: 30 TABLET | Refills: 5 | Status: ON HOLD | OUTPATIENT
Start: 2022-10-03 | End: 2022-11-10 | Stop reason: HOSPADM

## 2022-10-03 NOTE — PROGRESS NOTES
"New Clinic Note    Patient Name:  Abdias Carver is a 81 y.o. female     Chief Complaint:    Chief Complaint   Patient presents with    Leg Pain     Patient reports pain in both legs for the last month.     Fall     She fell last night because she states neither of her legs would move.     Chest Pain     She reports chest pain/upper abdominal pain and trouble breathing, off and on, since last night. Her heart rate is irregular and she has a history of a fib. She sees Dr. Tena for cardiology. She has an appointment to see him again in December. She was prescribed Diltiazem but she quit taking it last week because "it made her feel funny."        Subjective  HPI         Current Outpatient Medications:     aspirin 81 MG Chew, Take 1 tablet (81 mg total) by mouth once daily., Disp: 30 tablet, Rfl: 0    apixaban (ELIQUIS) 2.5 mg Tab, Take 1 tablet (2.5 mg total) by mouth 2 (two) times daily., Disp: 60 tablet, Rfl: 5    atorvastatin (LIPITOR) 40 MG tablet, Take 1 tablet (40 mg total) by mouth every evening., Disp: 30 tablet, Rfl: 5    levothyroxine (SYNTHROID) 75 MCG tablet, Take 1 tablet (75 mcg total) by mouth once daily., Disp: 30 tablet, Rfl: 5    metoprolol succinate (TOPROL-XL) 25 MG 24 hr tablet, Take 1 tablet (25 mg total) by mouth once daily., Disp: 30 tablet, Rfl: 5    nitroGLYCERIN (NITROSTAT) 0.4 MG SL tablet, Place 1 tablet (0.4 mg total) under the tongue every 5 (five) minutes as needed for Chest pain. Put 1 tablet under your tongue as needed for chest pain; may take up to 3 doses 5 minutes apart. If no resolutiion of CP go to the nearest ED for evaluation., Disp: 25 tablet, Rfl: 3    omeprazole (PRILOSEC OTC) 20 MG tablet, Take 1 tablet (20 mg total) by mouth once daily., Disp: 30 tablet, Rfl: 5   Past Medical History:   Diagnosis Date    Atrial fibrillation     Coronary artery disease     CVA (cerebral vascular accident)     2021    GERD (gastroesophageal reflux disease)     Hyperlipidemia     " "Hypertension     Hypothyroidism     Pulmonary emboli     PVD (peripheral vascular disease)     Vitamin D deficiency       Past Surgical History:   Procedure Laterality Date    APPENDECTOMY      CARDIAC CATHETERIZATION Left 04/2013    CATARACT EXTRACTION        Family History   Problem Relation Age of Onset    Diabetes Mother     Hypertension Mother     Heart disease Mother     Cancer Mother     Heart disease Father     Hypertension Father     Parkinsonism Sister     Alcohol abuse Daughter       Social History     Tobacco Use    Smoking status: Never    Smokeless tobacco: Never   Substance Use Topics    Alcohol use: Never    Drug use: Never        Review of Systems   Constitutional:  Negative for fever.   Respiratory:  Positive for shortness of breath.    Cardiovascular:  Positive for palpitations. Negative for chest pain, leg swelling and claudication.   Gastrointestinal:  Negative for abdominal pain.   Genitourinary:  Negative for dysuria.   Musculoskeletal:  Positive for back pain (chronic).   Integumentary:  Negative for rash.   Neurological:  Negative for headaches.      Objective:  /68 (BP Location: Left arm, Patient Position: Sitting)   Pulse (!) 124   Temp 96.8 °F (36 °C) (Temporal)   Resp 16   Ht 5' 3" (1.6 m)   Wt 52.2 kg (115 lb)   SpO2 (!) 94%   BMI 20.37 kg/m²      Physical Exam  HENT:      Head: Normocephalic and atraumatic.   Eyes:      Extraocular Movements: Extraocular movements intact.      Pupils: Pupils are equal, round, and reactive to light.   Cardiovascular:      Rate and Rhythm: Tachycardia present. Rhythm irregular.      Pulses: Normal pulses.      Heart sounds: Normal heart sounds.   Pulmonary:      Effort: Pulmonary effort is normal.      Breath sounds: Normal breath sounds. No rales.   Abdominal:      General: Abdomen is flat.   Musculoskeletal:      Cervical back: Normal range of motion.      Comments: Uses walker to steady gait   Neurological:      Mental Status: She is " oriented to person, place, and time.        Assessment and Plan    Longstanding persistent atrial fibrillation  -     apixaban (ELIQUIS) 2.5 mg Tab; Take 1 tablet (2.5 mg total) by mouth 2 (two) times daily.  Dispense: 60 tablet; Refill: 5  -     POCT EKG 12-LEAD (NOT FOR [facility] USE)  -     metoprolol succinate (TOPROL-XL) 25 MG 24 hr tablet; Take 1 tablet (25 mg total) by mouth once daily.  Dispense: 30 tablet; Refill: 5  -     Basic Metabolic Panel; Future; Expected date: 10/03/2022  -     Magnesium; Future; Expected date: 10/03/2022  -     CBC Auto Differential; Future; Expected date: 10/03/2022  -     Ambulatory referral/consult to Cardiology; Future; Expected date: 10/10/2022    Hyperlipidemia, unspecified hyperlipidemia type  -     atorvastatin (LIPITOR) 40 MG tablet; Take 1 tablet (40 mg total) by mouth every evening.  Dispense: 30 tablet; Refill: 5    Acquired hypothyroidism  -     levothyroxine (SYNTHROID) 75 MCG tablet; Take 1 tablet (75 mcg total) by mouth once daily.  Dispense: 30 tablet; Refill: 5  -     TSH; Future; Expected date: 10/03/2022    Gastroesophageal reflux disease, unspecified whether esophagitis present  -     omeprazole (PRILOSEC OTC) 20 MG tablet; Take 1 tablet (20 mg total) by mouth once daily.  Dispense: 30 tablet; Refill: 5       Problem List Items Addressed This Visit          Cardiac/Vascular    Atrial fibrillation - Primary    Overview     Paroxysmal afib; on Eliquis for cva prophylaxis         Relevant Medications    apixaban (ELIQUIS) 2.5 mg Tab    metoprolol succinate (TOPROL-XL) 25 MG 24 hr tablet    Other Relevant Orders    POCT EKG 12-LEAD (NOT FOR [facility] USE)    Basic Metabolic Panel    Magnesium    CBC Auto Differential    Ambulatory referral/consult to Cardiology    Hyperlipidemia    Relevant Medications    atorvastatin (LIPITOR) 40 MG tablet       GI    Gastroesophageal reflux disease (Chronic)    Relevant Medications    omeprazole (PRILOSEC OTC) 20 MG tablet     Other  Visit Diagnoses       Acquired hypothyroidism        Relevant Medications    levothyroxine (SYNTHROID) 75 MCG tablet    Other Relevant Orders    TSH         Will start Toprol 25mg qd for her a-fib, rate is too fast today but she stopped her diltiazem on her own.  Didn't want to take it.  Get HH to check her bp and HR.  Check labs and get her back to Dr. Tena.  EKG a fib with rate around 150 but she looks fine in clinic    Follow up per HH.

## 2022-10-04 ENCOUNTER — TELEPHONE (OUTPATIENT)
Dept: FAMILY MEDICINE | Facility: CLINIC | Age: 82
End: 2022-10-04
Payer: MEDICARE

## 2022-10-04 LAB
ANION GAP SERPL CALCULATED.3IONS-SCNC: 10 MMOL/L (ref 7–16)
BASOPHILS # BLD AUTO: 0.03 K/UL (ref 0–0.2)
BASOPHILS NFR BLD AUTO: 0.6 % (ref 0–1)
BUN SERPL-MCNC: 25 MG/DL (ref 7–18)
BUN/CREAT SERPL: 22 (ref 6–20)
CALCIUM SERPL-MCNC: 9 MG/DL (ref 8.5–10.1)
CHLORIDE SERPL-SCNC: 110 MMOL/L (ref 98–107)
CO2 SERPL-SCNC: 26 MMOL/L (ref 21–32)
CREAT SERPL-MCNC: 1.16 MG/DL (ref 0.55–1.02)
DIFFERENTIAL METHOD BLD: ABNORMAL
EGFR (NO RACE VARIABLE) (RUSH/TITUS): 47 ML/MIN/1.73M²
EKG 12-LEAD: NORMAL
EOSINOPHIL # BLD AUTO: 0.07 K/UL (ref 0–0.5)
EOSINOPHIL NFR BLD AUTO: 1.3 % (ref 1–4)
ERYTHROCYTE [DISTWIDTH] IN BLOOD BY AUTOMATED COUNT: 15.1 % (ref 11.5–14.5)
GLUCOSE SERPL-MCNC: 97 MG/DL (ref 74–106)
HCT VFR BLD AUTO: 41.6 % (ref 38–47)
HGB BLD-MCNC: 13.6 G/DL (ref 12–16)
IMM GRANULOCYTES # BLD AUTO: 0.01 K/UL (ref 0–0.04)
IMM GRANULOCYTES NFR BLD: 0.2 % (ref 0–0.4)
LYMPHOCYTES # BLD AUTO: 1.81 K/UL (ref 1–4.8)
LYMPHOCYTES NFR BLD AUTO: 33.6 % (ref 27–41)
MAGNESIUM SERPL-MCNC: 2 MG/DL (ref 1.7–2.3)
MCH RBC QN AUTO: 30.2 PG (ref 27–31)
MCHC RBC AUTO-ENTMCNC: 32.7 G/DL (ref 32–36)
MCV RBC AUTO: 92.4 FL (ref 80–96)
MONOCYTES # BLD AUTO: 0.41 K/UL (ref 0–0.8)
MONOCYTES NFR BLD AUTO: 7.6 % (ref 2–6)
MPC BLD CALC-MCNC: 10.6 FL (ref 9.4–12.4)
NEUTROPHILS # BLD AUTO: 3.06 K/UL (ref 1.8–7.7)
NEUTROPHILS NFR BLD AUTO: 56.7 % (ref 53–65)
NRBC # BLD AUTO: 0 X10E3/UL
NRBC, AUTO (.00): 0 %
PLATELET # BLD AUTO: 239 K/UL (ref 150–400)
POTASSIUM SERPL-SCNC: 4.2 MMOL/L (ref 3.5–5.1)
PR INTERVAL: NORMAL
PRT AXES: NORMAL
QRS DURATION: NORMAL
QT/QTC: NORMAL
RBC # BLD AUTO: 4.5 M/UL (ref 4.2–5.4)
SODIUM SERPL-SCNC: 142 MMOL/L (ref 136–145)
TSH SERPL DL<=0.005 MIU/L-ACNC: 1.12 UIU/ML (ref 0.36–3.74)
VENTRICULAR RATE: NORMAL
WBC # BLD AUTO: 5.39 K/UL (ref 4.5–11)

## 2022-10-04 NOTE — TELEPHONE ENCOUNTER
"Per [facility]:    SPOKE TO SUSAN IVERSON AT DR. JACOBSON OFFICE. APPROVED SN POC DEVELOPED TO TREAT FOR THE SERVICES OF AFIB, MED MANAGEMENT,  AND MULTIPLE MEDICAL CONDITIONS    SOC TODAY    VITALS 122/72, PULSE RANGING   IRREGULAR RHYTHM, RESP 20, AFEBRILE,  WT 111LBS    STATES STARTED METOPROLOL 25MG ONE PO DAILY YESTERDAY    STATES SHE HAD BEEN HOLDING DILTIAZEM 180MG ONE PO DAILY FOR NEARLY A WEEK PER SELF STATES WAS MAKING HER FEEL "FUNNY"  DOES REPORT OCC DIZZINESS,  DENIES CHESTPAIN OR SOB TODAY  "

## 2022-10-31 ENCOUNTER — EXTERNAL CHRONIC CARE MANAGEMENT (OUTPATIENT)
Dept: FAMILY MEDICINE | Facility: CLINIC | Age: 82
End: 2022-10-31
Payer: MEDICARE

## 2022-10-31 PROCEDURE — G0511 CCM/BHI BY RHC/FQHC 20MIN MO: HCPCS | Mod: ,,, | Performed by: INTERNAL MEDICINE

## 2022-10-31 PROCEDURE — G0511 PR CHRONIC CARE MGMT, RHC OR FQHC ONLY, 20 MINS OR MORE: ICD-10-PCS | Mod: ,,, | Performed by: INTERNAL MEDICINE

## 2022-11-08 ENCOUNTER — HOSPITAL ENCOUNTER (INPATIENT)
Facility: HOSPITAL | Age: 82
LOS: 3 days | Discharge: NURSING, REHAB OR LONG-TERM CARE | DRG: 281 | End: 2022-11-11
Attending: EMERGENCY MEDICINE | Admitting: INTERNAL MEDICINE
Payer: MEDICARE

## 2022-11-08 DIAGNOSIS — R07.9 CHEST PAIN: ICD-10-CM

## 2022-11-08 DIAGNOSIS — I48.11 LONGSTANDING PERSISTENT ATRIAL FIBRILLATION: ICD-10-CM

## 2022-11-08 DIAGNOSIS — R06.02 SHORTNESS OF BREATH: Primary | ICD-10-CM

## 2022-11-08 DIAGNOSIS — I48.91 ATRIAL FIBRILLATION WITH RAPID VENTRICULAR RESPONSE: ICD-10-CM

## 2022-11-08 DIAGNOSIS — R79.89 ELEVATED TROPONIN: ICD-10-CM

## 2022-11-08 DIAGNOSIS — I50.9 CHF (CONGESTIVE HEART FAILURE): ICD-10-CM

## 2022-11-08 DIAGNOSIS — R53.1 GENERALIZED WEAKNESS: ICD-10-CM

## 2022-11-08 DIAGNOSIS — I48.19 PERSISTENT ATRIAL FIBRILLATION WITH RAPID VENTRICULAR RESPONSE: ICD-10-CM

## 2022-11-08 LAB
ALBUMIN SERPL BCP-MCNC: 3.5 G/DL (ref 3.5–5)
ALBUMIN/GLOB SERPL: 1.2 {RATIO}
ALP SERPL-CCNC: 94 U/L (ref 55–142)
ALT SERPL W P-5'-P-CCNC: 26 U/L (ref 13–56)
ANION GAP SERPL CALCULATED.3IONS-SCNC: 13 MMOL/L (ref 7–16)
AST SERPL W P-5'-P-CCNC: 24 U/L (ref 15–37)
BACTERIA #/AREA URNS HPF: ABNORMAL /HPF
BASOPHILS # BLD AUTO: 0.03 K/UL (ref 0–0.2)
BASOPHILS NFR BLD AUTO: 0.6 % (ref 0–1)
BILIRUB SERPL-MCNC: 1 MG/DL (ref ?–1.2)
BILIRUB UR QL STRIP: NEGATIVE
BUN SERPL-MCNC: 33 MG/DL (ref 7–18)
BUN/CREAT SERPL: 29 (ref 6–20)
CALCIUM SERPL-MCNC: 8.7 MG/DL (ref 8.5–10.1)
CHLORIDE SERPL-SCNC: 109 MMOL/L (ref 98–107)
CLARITY UR: ABNORMAL
CO2 SERPL-SCNC: 26 MMOL/L (ref 21–32)
COLOR UR: YELLOW
CREAT SERPL-MCNC: 1.14 MG/DL (ref 0.55–1.02)
DIFFERENTIAL METHOD BLD: ABNORMAL
EGFR (NO RACE VARIABLE) (RUSH/TITUS): 48 ML/MIN/1.73M²
EOSINOPHIL # BLD AUTO: 0.07 K/UL (ref 0–0.5)
EOSINOPHIL NFR BLD AUTO: 1.4 % (ref 1–4)
ERYTHROCYTE [DISTWIDTH] IN BLOOD BY AUTOMATED COUNT: 14.3 % (ref 11.5–14.5)
GLOBULIN SER-MCNC: 2.9 G/DL (ref 2–4)
GLUCOSE SERPL-MCNC: 99 MG/DL (ref 74–106)
GLUCOSE UR STRIP-MCNC: NORMAL MG/DL
HCT VFR BLD AUTO: 40.8 % (ref 38–47)
HGB BLD-MCNC: 13.2 G/DL (ref 12–16)
IMM GRANULOCYTES # BLD AUTO: 0.02 K/UL (ref 0–0.04)
IMM GRANULOCYTES NFR BLD: 0.4 % (ref 0–0.4)
KETONES UR STRIP-SCNC: NEGATIVE MG/DL
LEUKOCYTE ESTERASE UR QL STRIP: NEGATIVE
LYMPHOCYTES # BLD AUTO: 1.08 K/UL (ref 1–4.8)
LYMPHOCYTES NFR BLD AUTO: 22 % (ref 27–41)
MCH RBC QN AUTO: 29.4 PG (ref 27–31)
MCHC RBC AUTO-ENTMCNC: 32.4 G/DL (ref 32–36)
MCV RBC AUTO: 90.9 FL (ref 80–96)
MONOCYTES # BLD AUTO: 0.47 K/UL (ref 0–0.8)
MONOCYTES NFR BLD AUTO: 9.6 % (ref 2–6)
MPC BLD CALC-MCNC: 10 FL (ref 9.4–12.4)
MUCOUS THREADS #/AREA URNS HPF: ABNORMAL /HPF
NEUTROPHILS # BLD AUTO: 3.25 K/UL (ref 1.8–7.7)
NEUTROPHILS NFR BLD AUTO: 66 % (ref 53–65)
NITRITE UR QL STRIP: NEGATIVE
NRBC # BLD AUTO: 0 X10E3/UL
NRBC, AUTO (.00): 0 %
NT-PROBNP SERPL-MCNC: 5943 PG/ML (ref 1–450)
PH UR STRIP: 5.5 PH UNITS
PLATELET # BLD AUTO: 230 K/UL (ref 150–400)
POTASSIUM SERPL-SCNC: 4.2 MMOL/L (ref 3.5–5.1)
PROT SERPL-MCNC: 6.4 G/DL (ref 6.4–8.2)
PROT UR QL STRIP: 100
RBC # BLD AUTO: 4.49 M/UL (ref 4.2–5.4)
RBC # UR STRIP: NEGATIVE /UL
RBC #/AREA URNS HPF: ABNORMAL /HPF
SARS-COV-2 RDRP RESP QL NAA+PROBE: NEGATIVE
SODIUM SERPL-SCNC: 144 MMOL/L (ref 136–145)
SP GR UR STRIP: 1.03
SQUAMOUS #/AREA URNS LPF: ABNORMAL /LPF
TRICHOMONAS #/AREA URNS HPF: ABNORMAL /HPF
TROPONIN I SERPL HS-MCNC: 143.4 PG/ML
TROPONIN I SERPL HS-MCNC: 151 PG/ML
TSH SERPL DL<=0.005 MIU/L-ACNC: 2.62 UIU/ML (ref 0.36–3.74)
UROBILINOGEN UR STRIP-ACNC: 2 MG/DL
WBC # BLD AUTO: 4.92 K/UL (ref 4.5–11)
WBC #/AREA URNS HPF: ABNORMAL /HPF
YEAST #/AREA URNS HPF: ABNORMAL /HPF

## 2022-11-08 PROCEDURE — 99223 PR INITIAL HOSPITAL CARE,LEVL III: ICD-10-PCS | Mod: ,,, | Performed by: INTERNAL MEDICINE

## 2022-11-08 PROCEDURE — 93010 ELECTROCARDIOGRAM REPORT: CPT | Mod: ,,, | Performed by: STUDENT IN AN ORGANIZED HEALTH CARE EDUCATION/TRAINING PROGRAM

## 2022-11-08 PROCEDURE — 99283 PR EMERGENCY DEPT VISIT,LEVEL III: ICD-10-PCS | Mod: ,,, | Performed by: EMERGENCY MEDICINE

## 2022-11-08 PROCEDURE — 87186 SC STD MICRODIL/AGAR DIL: CPT | Performed by: EMERGENCY MEDICINE

## 2022-11-08 PROCEDURE — 87077 CULTURE AEROBIC IDENTIFY: CPT | Performed by: EMERGENCY MEDICINE

## 2022-11-08 PROCEDURE — 84484 ASSAY OF TROPONIN QUANT: CPT | Performed by: INTERNAL MEDICINE

## 2022-11-08 PROCEDURE — 81003 URINALYSIS AUTO W/O SCOPE: CPT | Performed by: EMERGENCY MEDICINE

## 2022-11-08 PROCEDURE — 99223 1ST HOSP IP/OBS HIGH 75: CPT | Mod: ,,, | Performed by: INTERNAL MEDICINE

## 2022-11-08 PROCEDURE — 93010 EKG 12-LEAD: ICD-10-PCS | Mod: ,,, | Performed by: STUDENT IN AN ORGANIZED HEALTH CARE EDUCATION/TRAINING PROGRAM

## 2022-11-08 PROCEDURE — 63600175 PHARM REV CODE 636 W HCPCS: Performed by: INTERNAL MEDICINE

## 2022-11-08 PROCEDURE — 99283 EMERGENCY DEPT VISIT LOW MDM: CPT | Mod: ,,, | Performed by: EMERGENCY MEDICINE

## 2022-11-08 PROCEDURE — 11000001 HC ACUTE MED/SURG PRIVATE ROOM

## 2022-11-08 PROCEDURE — 25000003 PHARM REV CODE 250: Performed by: INTERNAL MEDICINE

## 2022-11-08 PROCEDURE — 87635 SARS-COV-2 COVID-19 AMP PRB: CPT | Performed by: INTERNAL MEDICINE

## 2022-11-08 PROCEDURE — 80053 COMPREHEN METABOLIC PANEL: CPT | Performed by: EMERGENCY MEDICINE

## 2022-11-08 PROCEDURE — 83880 ASSAY OF NATRIURETIC PEPTIDE: CPT | Performed by: EMERGENCY MEDICINE

## 2022-11-08 PROCEDURE — 84443 ASSAY THYROID STIM HORMONE: CPT | Performed by: INTERNAL MEDICINE

## 2022-11-08 PROCEDURE — 36415 COLL VENOUS BLD VENIPUNCTURE: CPT | Performed by: EMERGENCY MEDICINE

## 2022-11-08 PROCEDURE — 81001 URINALYSIS AUTO W/SCOPE: CPT | Performed by: EMERGENCY MEDICINE

## 2022-11-08 PROCEDURE — 99285 EMERGENCY DEPT VISIT HI MDM: CPT | Mod: 25

## 2022-11-08 PROCEDURE — 84484 ASSAY OF TROPONIN QUANT: CPT | Performed by: EMERGENCY MEDICINE

## 2022-11-08 PROCEDURE — 85025 COMPLETE CBC W/AUTO DIFF WBC: CPT | Performed by: EMERGENCY MEDICINE

## 2022-11-08 PROCEDURE — 93005 ELECTROCARDIOGRAM TRACING: CPT

## 2022-11-08 PROCEDURE — 36415 COLL VENOUS BLD VENIPUNCTURE: CPT | Performed by: INTERNAL MEDICINE

## 2022-11-08 RX ORDER — ATORVASTATIN CALCIUM 40 MG/1
40 TABLET, FILM COATED ORAL NIGHTLY
Status: DISCONTINUED | OUTPATIENT
Start: 2022-11-08 | End: 2022-11-11 | Stop reason: HOSPADM

## 2022-11-08 RX ORDER — DILTIAZEM HYDROCHLORIDE 5 MG/ML
10 INJECTION INTRAVENOUS
Status: DISCONTINUED | OUTPATIENT
Start: 2022-11-08 | End: 2022-11-11 | Stop reason: HOSPADM

## 2022-11-08 RX ORDER — NALOXONE HCL 0.4 MG/ML
0.02 VIAL (ML) INJECTION
Status: DISCONTINUED | OUTPATIENT
Start: 2022-11-08 | End: 2022-11-11 | Stop reason: HOSPADM

## 2022-11-08 RX ORDER — LEVOTHYROXINE SODIUM 75 UG/1
75 TABLET ORAL DAILY
Status: DISCONTINUED | OUTPATIENT
Start: 2022-11-09 | End: 2022-11-11 | Stop reason: HOSPADM

## 2022-11-08 RX ORDER — NITROGLYCERIN 0.4 MG/1
0.4 TABLET SUBLINGUAL EVERY 5 MIN PRN
Status: DISCONTINUED | OUTPATIENT
Start: 2022-11-08 | End: 2022-11-11 | Stop reason: HOSPADM

## 2022-11-08 RX ORDER — DILTIAZEM HYDROCHLORIDE 60 MG/1
60 TABLET, FILM COATED ORAL EVERY 6 HOURS
Status: DISCONTINUED | OUTPATIENT
Start: 2022-11-08 | End: 2022-11-11 | Stop reason: HOSPADM

## 2022-11-08 RX ORDER — NAPROXEN SODIUM 220 MG/1
81 TABLET, FILM COATED ORAL DAILY
Status: DISCONTINUED | OUTPATIENT
Start: 2022-11-09 | End: 2022-11-11 | Stop reason: HOSPADM

## 2022-11-08 RX ORDER — FUROSEMIDE 10 MG/ML
40 INJECTION INTRAMUSCULAR; INTRAVENOUS
Status: DISCONTINUED | OUTPATIENT
Start: 2022-11-08 | End: 2022-11-10

## 2022-11-08 RX ORDER — PANTOPRAZOLE SODIUM 40 MG/1
40 TABLET, DELAYED RELEASE ORAL DAILY
Status: DISCONTINUED | OUTPATIENT
Start: 2022-11-09 | End: 2022-11-11 | Stop reason: HOSPADM

## 2022-11-08 RX ORDER — ONDANSETRON 2 MG/ML
4 INJECTION INTRAMUSCULAR; INTRAVENOUS EVERY 8 HOURS PRN
Status: DISCONTINUED | OUTPATIENT
Start: 2022-11-08 | End: 2022-11-11 | Stop reason: HOSPADM

## 2022-11-08 RX ORDER — SODIUM CHLORIDE 0.9 % (FLUSH) 0.9 %
10 SYRINGE (ML) INJECTION EVERY 12 HOURS PRN
Status: DISCONTINUED | OUTPATIENT
Start: 2022-11-08 | End: 2022-11-11 | Stop reason: HOSPADM

## 2022-11-08 RX ADMIN — DILTIAZEM HYDROCHLORIDE 60 MG: 60 TABLET, FILM COATED ORAL at 08:11

## 2022-11-08 RX ADMIN — APIXABAN 2.5 MG: 2.5 TABLET, FILM COATED ORAL at 08:11

## 2022-11-08 RX ADMIN — ATORVASTATIN CALCIUM 40 MG: 40 TABLET, FILM COATED ORAL at 08:11

## 2022-11-08 RX ADMIN — FUROSEMIDE 40 MG: 10 INJECTION, SOLUTION INTRAMUSCULAR; INTRAVENOUS at 08:11

## 2022-11-08 NOTE — FIRST PROVIDER EVALUATION
"Medical screening examination initiated.  I have conducted a focused provider triage encounter, findings are as follows:    Brief history of present illness:  shortness of breath    Vitals:    11/08/22 1440 11/08/22 1450 11/08/22 1510   BP: (!) 133/98 (!) 152/70 (!) 139/98   Pulse: (!) 113 108 108   Resp: 16  (!) 25   Temp: 98.5 °F (36.9 °C)     TempSrc: Oral     SpO2: 95% 98% 97%   Weight: 54.4 kg (120 lb)     Height: 5' 3" (1.6 m)         Pertinent physical exam:  tachycardic    Brief workup plan:  cardiac    Preliminary workup initiated; this workup will be continued and followed by the physician or advanced practice provider that is assigned to the patient when roomed.  "

## 2022-11-08 NOTE — ED PROVIDER NOTES
Encounter Date: 11/8/2022    SCRIBE #1 NOTE: I, Jayne Reardon, am scribing for, and in the presence of,  Adam Santamaria MD. I have scribed the entire note.     History     Chief Complaint   Patient presents with    Shortness of Breath    Weakness     This is an 83 y/o white female,who presents to the ED with complaints of generalized weakness which has been ongoing for sometime now. She states she has not been feeling good but at the same time she has not been feeling well either. Her family is in the room with us and helps with Hx secondary to the pt being hard of hearing. Her family states the pt has been having trouble breathing as well. There is no Hx of fever, CP, nausea, vomiting, or cough voiced while in the ED at this time. She has a known hx of Afib, PE, CVA, CAD, HTN, GERD, PVD, hyperlipidemia, and hypothyroidism.     The history is provided by the patient. No  was used.   Review of patient's allergies indicates:  No Known Allergies  Past Medical History:   Diagnosis Date    Atrial fibrillation     Coronary artery disease     CVA (cerebral vascular accident)     2021    GERD (gastroesophageal reflux disease)     Hyperlipidemia     Hypertension     Hypothyroidism     Pulmonary emboli     PVD (peripheral vascular disease)     Vitamin D deficiency      Past Surgical History:   Procedure Laterality Date    APPENDECTOMY      CARDIAC CATHETERIZATION Left 04/2013    CATARACT EXTRACTION       Family History   Problem Relation Age of Onset    Diabetes Mother     Hypertension Mother     Heart disease Mother     Cancer Mother     Heart disease Father     Hypertension Father     Parkinsonism Sister     Alcohol abuse Daughter      Social History     Tobacco Use    Smoking status: Never    Smokeless tobacco: Never   Substance Use Topics    Alcohol use: Never    Drug use: Never     Review of Systems   Constitutional:  Negative for fever.   Respiratory:  Positive for shortness of breath (Trouble  breathing per family.).    Cardiovascular:  Negative for chest pain.   Gastrointestinal:  Negative for nausea and vomiting.   Neurological:  Positive for weakness (Generalized weakness.).   All other systems reviewed and are negative.    Physical Exam     Initial Vitals [11/08/22 1440]   BP Pulse Resp Temp SpO2   (!) 133/98 (!) 113 16 98.5 °F (36.9 °C) 95 %      MAP       --         Physical Exam    Nursing note and vitals reviewed.  Constitutional: She appears well-developed and well-nourished.   HENT:   Head: Normocephalic and atraumatic.   Eyes: Conjunctivae and EOM are normal. Pupils are equal, round, and reactive to light.   Neck: Neck supple.   Normal range of motion.  Cardiovascular:  Normal rate, regular rhythm, normal heart sounds and intact distal pulses.           Pulmonary/Chest: Breath sounds normal. No respiratory distress. She exhibits no tenderness.   Abdominal: Abdomen is soft. Bowel sounds are normal. There is no abdominal tenderness.   Musculoskeletal:         General: Normal range of motion.      Cervical back: Normal range of motion and neck supple.     Neurological: She is alert and oriented to person, place, and time. She has normal strength.   Skin: Skin is warm and dry. Capillary refill takes less than 2 seconds.   Psychiatric: She has a normal mood and affect. Thought content normal.       ED Course   Procedures  Labs Reviewed   COMPREHENSIVE METABOLIC PANEL - Abnormal; Notable for the following components:       Result Value    Chloride 109 (*)     BUN 33 (*)     Creatinine 1.14 (*)     BUN/Creatinine Ratio 29 (*)     eGFR 48 (*)     All other components within normal limits   TROPONIN I - Abnormal; Notable for the following components:    Troponin I High Sensitivity 143.4 (*)     All other components within normal limits   CBC WITH DIFFERENTIAL - Abnormal; Notable for the following components:    Neutrophils % 66.0 (*)     Lymphocytes % 22.0 (*)     Monocytes % 9.6 (*)     All other  components within normal limits   CBC W/ AUTO DIFFERENTIAL    Narrative:     The following orders were created for panel order CBC auto differential.  Procedure                               Abnormality         Status                     ---------                               -----------         ------                     CBC with Differential[[id number]]        Abnormal            Final result                 Please view results for these tests on the individual orders.   URINALYSIS, REFLEX TO URINE CULTURE   NT-PRO NATRIURETIC PEPTIDE        ECG Results              EKG 12-lead (Final result)  Result time 11/08/22 16:30:56      Final result by Interface, Lab In [facility] (11/08/22 16:30:56)                   Narrative:    Test Reason : R06.02,    Vent. Rate : 102 BPM     Atrial Rate : 170 BPM     P-R Int : 000 ms          QRS Dur : 060 ms      QT Int : 348 ms       P-R-T Axes : 000 090 054 degrees     QTc Int : 453 ms    Atrial fibrillation with rapid ventricular response  Rightward axis  Anteroseptal infarct ,age undetermined  Abnormal ECG  When compared with ECG of 07-JUN-2022 21:27,  PREVIOUS ECG IS PRESENT  Confirmed by Issa LOZOYA, Serafin CARTER ([id number]) on 11/8/2022 4:30:40 PM    Referred By: [email or web address]   SELF           Confirmed By:Serafin Parsons MD                                  Imaging Results              X-Ray Chest AP Portable (Final result)  Result time 11/08/22 15:36:46      Final result by Jesus Champion MD (11/08/22 15:36:46)                   Impression:      The bilateral pleural effusions.  Interstitial prominence may reflect edema or infiltrates.      Electronically signed by: Jesus Champion  Date:    11/08/2022  Time:    15:36               Narrative:    EXAMINATION:  XR CHEST AP PORTABLE    CLINICAL HISTORY:  Shortness of breath    TECHNIQUE:  Single frontal view of the chest was performed.    COMPARISON:  06/07/2022    FINDINGS:  There are somewhat low lung volumes.  There is a small to moderate  right and small left pleural effusion.  Interstitial prominence.  No pneumothorax.                                    X-Rays:   Independently Interpreted Readings:   Chest X-Ray: Xray Chest AP Portable:   The bilateral pleural effusions.  Interstitial prominence may reflect edema or infiltrates   Medications - No data to display             Attending Attestation:           Physician Attestation for Scribe:  Physician Attestation Statement for Scribe #1: I, Adam Santamaria MD, reviewed documentation, as scribed by Jayne Reardon in my presence, and it is both accurate and complete.           ED Course as of 11/08/22 1752 Tue Nov 08, 2022   1735 Xray Chest AP Portable:   The bilateral pleural effusions.  Interstitial prominence may reflect edema or infiltrates [BW]      ED Course User Index  [BW] Jayne Reardon                 Clinical Impression:   Final diagnoses:  [R06.02] Shortness of breath (Primary)  [R53.1] Generalized weakness  [R77.8] Elevated troponin        ED Disposition Condition    Admit Stable                Adam Santamaria MD  11/08/22 1752

## 2022-11-08 NOTE — ED TRIAGE NOTES
Presents to ED via EMS from home for c/o SOB and not feeling well for a few days. Hx of afib but patient's family states she refuses to take her medication.

## 2022-11-09 PROBLEM — I21.A1 TYPE 2 MI (MYOCARDIAL INFARCTION): Status: ACTIVE | Noted: 2022-11-09

## 2022-11-09 PROBLEM — R79.89 ELEVATED TROPONIN: Status: RESOLVED | Noted: 2022-11-08 | Resolved: 2022-11-09

## 2022-11-09 LAB
ANION GAP SERPL CALCULATED.3IONS-SCNC: 15 MMOL/L (ref 7–16)
AORTIC ROOT ANNULUS: 3.2 CM
AORTIC VALVE CUSP SEPERATION: 1.81 CM
ASCENDING AORTA: 2.75 CM
AV INDEX (PROSTH): 0.9
AV MEAN GRADIENT: 1 MMHG
AV PEAK GRADIENT: 1 MMHG
AV REGURGITATION PRESSURE HALF TIME: 656 MS
AV VALVE AREA: 1.59 CM2
AV VELOCITY RATIO: 0.83
BSA FOR ECHO PROCEDURE: 1.41 M2
BUN SERPL-MCNC: 30 MG/DL (ref 7–18)
BUN/CREAT SERPL: 28 (ref 6–20)
CALCIUM SERPL-MCNC: 9 MG/DL (ref 8.5–10.1)
CHLORIDE SERPL-SCNC: 105 MMOL/L (ref 98–107)
CO2 SERPL-SCNC: 26 MMOL/L (ref 21–32)
CREAT SERPL-MCNC: 1.06 MG/DL (ref 0.55–1.02)
CV ECHO LV RWT: 0.48 CM
DOP CALC AO PEAK VEL: 0.6 M/S
DOP CALC AO VTI: 10 CM
DOP CALC LVOT AREA: 1.8 CM2
DOP CALC LVOT DIAMETER: 1.5 CM
DOP CALC LVOT PEAK VEL: 0.5 M/S
DOP CALC LVOT STROKE VOLUME: 15.9 CM3
DOP CALC MV VTI: 41.3 CM
DOP CALCLVOT PEAK VEL VTI: 9 CM
E WAVE DECELERATION TIME: 379 MSEC
ECHO EF ESTIMATED: 50 %
ECHO LV POSTERIOR WALL: 0.89 CM (ref 0.6–1.1)
EGFR (NO RACE VARIABLE) (RUSH/TITUS): 52 ML/MIN/1.73M²
EJECTION FRACTION: 50 %
FRACTIONAL SHORTENING: 19 % (ref 28–44)
GLUCOSE SERPL-MCNC: 73 MG/DL (ref 74–106)
INTERVENTRICULAR SEPTUM: 0.86 CM (ref 0.6–1.1)
IVC OSTIUM: 1.3 CM
LEFT ATRIUM SIZE: 3.6 CM
LEFT INTERNAL DIMENSION IN SYSTOLE: 3.02 CM (ref 2.1–4)
LEFT VENTRICLE DIASTOLIC VOLUME INDEX: 41.19 ML/M2
LEFT VENTRICLE DIASTOLIC VOLUME: 58.9 ML
LEFT VENTRICLE MASS INDEX: 66 G/M2
LEFT VENTRICLE SYSTOLIC VOLUME INDEX: 24.9 ML/M2
LEFT VENTRICLE SYSTOLIC VOLUME: 35.6 ML
LEFT VENTRICULAR INTERNAL DIMENSION IN DIASTOLE: 3.72 CM (ref 3.5–6)
LEFT VENTRICULAR MASS: 93.94 G
LVOT MG: 1 MMHG
MV MEAN GRADIENT: 3 MMHG
MV PEAK E VEL: 1.28 M/S
MV PEAK GRADIENT: 6 MMHG
MV STENOSIS PRESSURE HALF TIME: 130 MS
MV VALVE AREA BY CONTINUITY EQUATION: 0.38 CM2
MV VALVE AREA P 1/2 METHOD: 1.69 CM2
PISA AR MAX VEL: 2.48 M/S
PISA TR MAX VEL: 2.2 M/S
POTASSIUM SERPL-SCNC: 3.3 MMOL/L (ref 3.5–5.1)
RA MAJOR: 3.3 CM
RA PRESSURE: 3 MMHG
RIGHT VENTRICULAR END-DIASTOLIC DIMENSION: 3 CM
SODIUM SERPL-SCNC: 143 MMOL/L (ref 136–145)
TR MAX PG: 19 MMHG
TRICUSPID ANNULAR PLANE SYSTOLIC EXCURSION: 1.9 CM
TROPONIN I SERPL HS-MCNC: 142.2 PG/ML
TV REST PULMONARY ARTERY PRESSURE: 22 MMHG

## 2022-11-09 PROCEDURE — 97162 PT EVAL MOD COMPLEX 30 MIN: CPT

## 2022-11-09 PROCEDURE — 99233 PR SUBSEQUENT HOSPITAL CARE,LEVL III: ICD-10-PCS | Mod: ,,, | Performed by: STUDENT IN AN ORGANIZED HEALTH CARE EDUCATION/TRAINING PROGRAM

## 2022-11-09 PROCEDURE — 84484 ASSAY OF TROPONIN QUANT: CPT | Performed by: INTERNAL MEDICINE

## 2022-11-09 PROCEDURE — 11000001 HC ACUTE MED/SURG PRIVATE ROOM

## 2022-11-09 PROCEDURE — 25000003 PHARM REV CODE 250: Performed by: INTERNAL MEDICINE

## 2022-11-09 PROCEDURE — 36415 COLL VENOUS BLD VENIPUNCTURE: CPT | Performed by: INTERNAL MEDICINE

## 2022-11-09 PROCEDURE — 25000003 PHARM REV CODE 250: Performed by: STUDENT IN AN ORGANIZED HEALTH CARE EDUCATION/TRAINING PROGRAM

## 2022-11-09 PROCEDURE — 80048 BASIC METABOLIC PNL TOTAL CA: CPT | Performed by: INTERNAL MEDICINE

## 2022-11-09 PROCEDURE — 63600175 PHARM REV CODE 636 W HCPCS: Performed by: INTERNAL MEDICINE

## 2022-11-09 PROCEDURE — 99233 SBSQ HOSP IP/OBS HIGH 50: CPT | Mod: ,,, | Performed by: STUDENT IN AN ORGANIZED HEALTH CARE EDUCATION/TRAINING PROGRAM

## 2022-11-09 RX ORDER — POTASSIUM CHLORIDE 20 MEQ/1
40 TABLET, EXTENDED RELEASE ORAL ONCE
Status: COMPLETED | OUTPATIENT
Start: 2022-11-09 | End: 2022-11-09

## 2022-11-09 RX ADMIN — POTASSIUM CHLORIDE 40 MEQ: 1500 TABLET, EXTENDED RELEASE ORAL at 03:11

## 2022-11-09 RX ADMIN — DILTIAZEM HYDROCHLORIDE 60 MG: 60 TABLET, FILM COATED ORAL at 03:11

## 2022-11-09 RX ADMIN — LEVOTHYROXINE SODIUM 75 MCG: 75 TABLET ORAL at 08:11

## 2022-11-09 RX ADMIN — FUROSEMIDE 40 MG: 10 INJECTION, SOLUTION INTRAMUSCULAR; INTRAVENOUS at 08:11

## 2022-11-09 RX ADMIN — ATORVASTATIN CALCIUM 40 MG: 40 TABLET, FILM COATED ORAL at 08:11

## 2022-11-09 RX ADMIN — APIXABAN 2.5 MG: 2.5 TABLET, FILM COATED ORAL at 08:11

## 2022-11-09 RX ADMIN — FUROSEMIDE 40 MG: 10 INJECTION, SOLUTION INTRAMUSCULAR; INTRAVENOUS at 10:11

## 2022-11-09 RX ADMIN — ASPIRIN 81 MG 81 MG: 81 TABLET ORAL at 08:11

## 2022-11-09 RX ADMIN — DILTIAZEM HYDROCHLORIDE 60 MG: 60 TABLET, FILM COATED ORAL at 08:11

## 2022-11-09 RX ADMIN — PANTOPRAZOLE SODIUM 40 MG: 40 TABLET, DELAYED RELEASE ORAL at 08:11

## 2022-11-09 NOTE — SUBJECTIVE & OBJECTIVE
Interval History: NAEO     Review of Systems   Constitutional:  Negative for chills, fatigue, fever and unexpected weight change.   HENT:  Negative for congestion, mouth sores and sore throat.    Eyes:  Negative for photophobia and visual disturbance.   Respiratory:  Negative for cough, chest tightness, shortness of breath and wheezing.    Cardiovascular:  Negative for chest pain, palpitations and leg swelling.   Gastrointestinal:  Negative for abdominal pain, diarrhea, nausea and vomiting.   Endocrine: Negative for cold intolerance and heat intolerance.   Genitourinary:  Negative for difficulty urinating, dysuria, frequency and urgency.   Musculoskeletal:  Negative for arthralgias, back pain and myalgias.   Skin:  Negative for pallor and rash.   Neurological:  Positive for weakness. Negative for tremors, seizures, syncope, numbness and headaches.   Hematological:  Does not bruise/bleed easily.   Psychiatric/Behavioral:  Negative for agitation, confusion, hallucinations and suicidal ideas.    All other systems reviewed and are negative.  Objective:     Vital Signs (Most Recent):  Temp: 97.5 °F (36.4 °C) (11/09/22 1200)  Pulse: 67 (11/09/22 1200)  Resp: 18 (11/09/22 1200)  BP: (!) 109/53 (11/09/22 1200)  SpO2: 100 % (11/09/22 1200)   Vital Signs (24h Range):  Temp:  [97.4 °F (36.3 °C)-97.8 °F (36.6 °C)] 97.5 °F (36.4 °C)  Pulse:  [] 67  Resp:  [16-27] 18  SpO2:  [94 %-100 %] 100 %  BP: (109-150)/() 109/53     Weight: 44.9 kg (98 lb 15.8 oz)  Body mass index is 17.53 kg/m².    Intake/Output Summary (Last 24 hours) at 11/9/2022 1542  Last data filed at 11/9/2022 0400  Gross per 24 hour   Intake --   Output 1500 ml   Net -1500 ml      Physical Exam  Vitals reviewed.   Constitutional:       Appearance: Normal appearance.   HENT:      Head: Normocephalic and atraumatic.   Eyes:      Extraocular Movements: Extraocular movements intact.   Cardiovascular:      Rate and Rhythm: Normal rate. Rhythm irregular.       Pulses: Normal pulses.   Pulmonary:      Effort: Pulmonary effort is normal.      Breath sounds: Normal breath sounds.   Abdominal:      General: Abdomen is flat.      Palpations: Abdomen is soft.   Musculoskeletal:      Comments: Thin, moves all extremities   Skin:     General: Skin is warm and dry.      Capillary Refill: Capillary refill takes less than 2 seconds.   Neurological:      General: No focal deficit present.      Mental Status: She is alert. She is disoriented.   Psychiatric:         Mood and Affect: Mood normal.         Behavior: Behavior normal.       Significant Labs: All pertinent labs within the past 24 hours have been reviewed.    Significant Imaging: I have reviewed all pertinent imaging results/findings within the past 24 hours.

## 2022-11-09 NOTE — HOSPITAL COURSE
11/9 mild confusion. Feels better. Son in room, says she looks much better  11/10- much improved today.  Stable for discharge.  Swingbed when approved.  Contineu Dilt, stop home metoprolol. Follow up with PCP.  11/10- Unable to be approved for swingbed today. Cancel Dc. Dc in AM.  11/11- placement found today, stable for discharge.  Urine cultures resulted pan sensitive ecoli.  Keflex for 7 days

## 2022-11-09 NOTE — ASSESSMENT & PLAN NOTE
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  However, patient's family member stated that patient was able to walk a walker but for the past day or so patient was not able to do so secondary to generalized weakness.  Neurological examination was notable for were +3/5 RLE but otherwise unremarkable.  Even if this was a stroke, patient is not a candidate for systemic thrombolytic or percutaneous mechanical thrombectomy due to onset of symptoms greater than 24 hours.  Will proceed with MRI.  Feel that patient's right-sided weakness has been accentuated by generalized weakness associated with atrial fibrillation with RVR.

## 2022-11-09 NOTE — PLAN OF CARE
Problem: Physical Therapy  Goal: Physical Therapy Goal  Description: Short term goals:  1. Supine to sit with Contact Guard Assistance  2. Bed to chair transfer with Contact Guard Assistance using Rolling Walker  3. Gait  x 100 feet with Contact Guard Assistance using Rolling Walker.     Long term goals:  1. Supine to sit with Modified Dema  2. Bed to chair transfer with Modified Dema using Rolling Walker  3. Gait  x 200 feet with Modified Dema using Rolling Walker.   Outcome: Ongoing, Progressing

## 2022-11-09 NOTE — PROGRESS NOTES
[facility] Medical Telemetry Hospital Medicine  Progress Note    Patient Name: Abdias Carver  MRN: [id number]  Patient Class: IP- Inpatient   Admission Date: 11/8/2022  Length of Stay: 1 days  Attending Physician: Kaz Hanna DO  Primary Care Provider: Mandeep Branham MD        Subjective:     Principal Problem:Persistent atrial fibrillation with rapid ventricular response        HPI:  Patient is on 82-year-old female with a history of essential hypertension, hypothyroidism, pulmonary thromboembolism, CKD stage 3, longstanding persistent atrial fibrillation on rate control and Eliquis anticoagulation in the setting of mild nonobstructive CAD with preserved EF, and 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  Patient is very hard of hearing and thus entire history of was provided by a family member in the room.  Family member stated that patient has not been taking diltiazem for the past few days stating that that medication made her feel bad there were otherwise no reported fever chills chest pain palpitation productive cough wheezing PND orthopnea or lower extremity edema in association.  On presentation, patient was normotensive but was found been AFib with RVR with heart rate as high as 170, tachypneic and mildly hypoxemic on room air.  Ensuing workup was notable for chest x-ray suggestive of a presence of pulmonary edema with bilateral pleural effusions along with significantly elevated proBNP level and a mild elevation in troponin doubt any ischemic abnormalities seen on EKG. Patient will be admitted for further evaluation and intervention      Overview/Hospital Course:  11/9 mild confusion. Feels better. Son in room, says she looks much better      Interval History: NAEO     Review of Systems   Constitutional:  Negative for chills, fatigue, fever and unexpected weight change.    HENT:  Negative for congestion, mouth sores and sore throat.    Eyes:  Negative for photophobia and visual disturbance.   Respiratory:  Negative for cough, chest tightness, shortness of breath and wheezing.    Cardiovascular:  Negative for chest pain, palpitations and leg swelling.   Gastrointestinal:  Negative for abdominal pain, diarrhea, nausea and vomiting.   Endocrine: Negative for cold intolerance and heat intolerance.   Genitourinary:  Negative for difficulty urinating, dysuria, frequency and urgency.   Musculoskeletal:  Negative for arthralgias, back pain and myalgias.   Skin:  Negative for pallor and rash.   Neurological:  Positive for weakness. Negative for tremors, seizures, syncope, numbness and headaches.   Hematological:  Does not bruise/bleed easily.   Psychiatric/Behavioral:  Negative for agitation, confusion, hallucinations and suicidal ideas.    All other systems reviewed and are negative.  Objective:     Vital Signs (Most Recent):  Temp: 97.5 °F (36.4 °C) (11/09/22 1200)  Pulse: 67 (11/09/22 1200)  Resp: 18 (11/09/22 1200)  BP: (!) 109/53 (11/09/22 1200)  SpO2: 100 % (11/09/22 1200)   Vital Signs (24h Range):  Temp:  [97.4 °F (36.3 °C)-97.8 °F (36.6 °C)] 97.5 °F (36.4 °C)  Pulse:  [] 67  Resp:  [16-27] 18  SpO2:  [94 %-100 %] 100 %  BP: (109-150)/() 109/53     Weight: 44.9 kg (98 lb 15.8 oz)  Body mass index is 17.53 kg/m².    Intake/Output Summary (Last 24 hours) at 11/9/2022 1542  Last data filed at 11/9/2022 0400  Gross per 24 hour   Intake --   Output 1500 ml   Net -1500 ml      Physical Exam  Vitals reviewed.   Constitutional:       Appearance: Normal appearance.   HENT:      Head: Normocephalic and atraumatic.   Eyes:      Extraocular Movements: Extraocular movements intact.   Cardiovascular:      Rate and Rhythm: Normal rate. Rhythm irregular.      Pulses: Normal pulses.   Pulmonary:      Effort: Pulmonary effort is normal.      Breath sounds: Normal breath sounds.    Abdominal:      General: Abdomen is flat.      Palpations: Abdomen is soft.   Musculoskeletal:      Comments: Thin, moves all extremities   Skin:     General: Skin is warm and dry.      Capillary Refill: Capillary refill takes less than 2 seconds.   Neurological:      General: No focal deficit present.      Mental Status: She is alert. She is disoriented.   Psychiatric:         Mood and Affect: Mood normal.         Behavior: Behavior normal.       Significant Labs: All pertinent labs within the past 24 hours have been reviewed.    Significant Imaging: I have reviewed all pertinent imaging results/findings within the past 24 hours.      Assessment/Plan:      * Persistent atrial fibrillation with rapid ventricular response  Missing doses of diltiazem at home, sometimes taking double doses due to confusion.  Son has been attempting to help with medication but is not always able  RVR likely 2/2 to missed medication doses  Overdosing CC blockade can be contributing to weakness as well if this has been occurring.         Type 2 MI (myocardial infarction)  2/2 af with RVR      Acute congestive heart failure  EF somewhat reduced on my review.  Awaiting formal read   Does not appear volume overloaded on exam  Can start GDMT pending formal read- will stop ccb if HFrEF.      Generalized weakness  PT/OT  AF now rate controlled      Cerebrovascular accident (CVA)  MRI with no acute findings        VTE Risk Mitigation (From admission, onward)         Ordered     apixaban tablet 2.5 mg  2 times daily         11/08/22 1828     Place sequential compression device  Until discontinued         11/08/22 2025                Discharge Planning   NATY:      Code Status: DNR   Is the patient medically ready for discharge?:     Reason for patient still in hospital (select all that apply): Treatment  Discharge Plan A: Home, Home Health                  Kaz Hanna DO  Department of [facility] Medicine   [facility]  Medical Telemetry

## 2022-11-09 NOTE — ASSESSMENT & PLAN NOTE
Echocardiogram approximately a year ago demonstrated intact EF with no evidence of diastolic dysfunction.  Feel that this is due to atrial fibrillation with rapid ventricular response.  Will hold beta-blocker for now and utilize increased dose of dihydropyridine CCB for rate control.  Patient will be diuresed with IV Lasix as well

## 2022-11-09 NOTE — PT/OT/SLP EVAL
Physical Therapy Evaluation    Patient Name:  Abdias Carver   MRN:  [id number]    Recommendations:     Discharge Recommendations:  nursing facility, skilled, rehabilitation facility   Discharge Equipment Recommendations: other (see comments) (to be determined)   Barriers to discharge: Inaccessible home and Decreased caregiver support    Assessment:     Abdias Carver is a 82 y.o. female admitted with a medical diagnosis of Persistent atrial fibrillation with rapid ventricular response.  She presents with the following impairments/functional limitations:  weakness, impaired self care skills, impaired functional mobility, gait instability, impaired balance, impaired cognition, decreased coordination, decreased lower extremity function, decreased safety awareness Pt was previously living alone and ambulatory without assistance but now requires assistance to maintain sitting balance. She demonstrates fair strength with slight weakness in RLE. Pt has more coordination deficits and demonstrates poor quality gait even with rolling walker . Pt will need assistance at d/c    Rehab Prognosis: Fair; patient would benefit from acute skilled PT services to address these deficits and reach maximum level of function.    Recent Surgery: * No surgery found *      Plan:     During this hospitalization, patient to be seen 5 x/week to address the identified rehab impairments via gait training, therapeutic activities, therapeutic exercises and progress toward the following goals:    Plan of Care Expires:  12/09/22    Subjective     Chief Complaint: CVA  Patient/Family Comments/goals: Pt is very [place]  Pain/Comfort:  Pain Rating 1: 0/10  Pain Rating Post-Intervention 1: 0/10    Patients cultural, spiritual, [employer]ology conflicts given the current situation: no    Living Environment:  Pt lives alone but has been staying with her sister recently  Prior to admission, patients level of function was ambulatory with rollator.  Equipment  used at home: rollator.  DME owned (not currently used): none.  Upon discharge, patient will have assistance from family.    Objective:     Communicated with RAFAEL Gonzales LPN prior to session.  Patient found supine with peripheral IV, telemetry, PureWick  upon PT entry to room.    General Precautions: Standard, fall   Orthopedic Precautions:N/A   Braces: N/A  Respiratory Status: Room air    Exams:  Cognitive Exam:  Patient is oriented to Person  Gross Motor Coordination:  impaired finger to nose on RLE  RLE ROM: WFL except would not flex knee in supine  RLE Strength: 3+/5  LLE ROM: WFL  LLE Strength: 4/5    Functional Mobility:  Bed Mobility:     Scooting: moderate assistance  Supine to Sit: moderate assistance  Sit to Supine: moderate assistance  Transfers:     Sit to Stand:  contact guard assistance with rolling walker  Bed to Chair: minimum assistance with  rolling walker  using  Step Transfer  Gait: 15 ft minimal assistance with rolling walker, dragging RLE  Balance: fair, leans to right      AM-PAC 6 CLICK MOBILITY  Total Score:18       Treatment & Education:  Pt educated on PT role/POC.   Importance of OOB activity with staff assistance.  Importance of sitting up in the chair throughout the day as tolerated, especially for meals   Safety during functional t/f and mobility with use of rolling walker   Multiple self-care tasks/functional mobility completed- assistance level noted above   All questions/concerns answered within PT scope of practice    Patient left supine with all lines intact and call button in reach.    GOALS:   Multidisciplinary Problems       Physical Therapy Goals          Problem: Physical Therapy    Goal Priority Disciplines Outcome Goal Variances Interventions   Physical Therapy Goal     PT, PT/OT Ongoing, Progressing     Description: Short term goals:  1. Supine to sit with Contact Guard Assistance  2. Bed to chair transfer with Contact Guard Assistance using Rolling Walker  3. Gait  x 100  feet with Contact Guard Assistance using Rolling Walker.     Long term goals:  1. Supine to sit with Modified [place]nett  2. Bed to chair transfer with Modified Arnett using Rolling Walker  3. Gait  x 200 feet with Modified Arnett using Rolling Walker.                        History:     Past Medical History:   Diagnosis Date    Atrial fibrillation     Coronary artery disease     CVA (cerebral vascular accident)     2021    GERD (gastroesophageal reflux disease)     Hyperlipidemia     Hypertension     Hypothyroidism     Pulmonary emboli     PVD (peripheral vascular disease)     Vitamin D deficiency        Past Surgical History:   Procedure Laterality Date    APPENDECTOMY      CARDIAC CATHETERIZATION Left 04/2013    CATARACT EXTRACTION         Time Tracking:     PT Received On: 11/09/22  PT Start Time: 1345     PT Stop Time: 1410  PT Total Time (min): 25 min     Billable Minutes: Evaluation moderate complexity      11/09/2022

## 2022-11-09 NOTE — H&P
[facility] - Emergency Department  Hospital Medicine  History & Physical    Patient Name: Abdias Carver  MRN: [id number]  Patient Class: Emergency  Admission Date: 11/8/2022  Attending Physician: Dr. Hanna  Primary Care Provider: Mandeep Branham MD         Patient information was obtained from patient and ER records.     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  Patient is very hard of hearing and thus entire history of was provided by a family member in the room.  Family member stated that patient has not been taking diltiazem for the past few days stating that that medication made her feel bad there were otherwise no reported fever chills chest pain palpitation productive cough wheezing PND orthopnea or lower extremity edema in association.  On presentation, patient was normotensive but was found been AFib with RVR with heart rate as high as 170, tachypneic and mildly hypoxemic on room air.  Ensuing workup was notable for chest x-ray suggestive of a presence of pulmonary edema with bilateral pleural effusions along with significantly elevated proBNP level and a mild elevation in troponin doubt any ischemic abnormalities seen on EKG. Patient will be admitted for further evaluation and intervention      Past Medical History:   Diagnosis Date    Atrial fibrillation     Coronary artery disease     CVA  (cerebral vascular accident)     2021    GERD (gastroesophageal reflux disease)     Hyperlipidemia     Hypertension     Hypothyroidism     Pulmonary emboli     PVD (peripheral vascular disease)     Vitamin D deficiency        Past Surgical History:   Procedure Laterality Date    APPENDECTOMY      CARDIAC CATHETERIZATION Left 04/2013    CATARACT EXTRACTION         Review of patient's allergies indicates:  No Known Allergies    No current facility-administered medications on file prior to encounter.     Current Outpatient Medications on File Prior to Encounter   Medication Sig    apixaban (ELIQUIS) 2.5 mg Tab Take 1 tablet (2.5 mg total) by mouth 2 (two) times daily.    aspirin 81 MG Chew Take 1 tablet (81 mg total) by mouth once daily.    atorvastatin (LIPITOR) 40 MG tablet Take 1 tablet (40 mg total) by mouth every evening.    diltiaZEM (TIAZAC) 180 MG Cs24 TAKE ONE CAPSULE BY MOUTH ONCE DAILY    levothyroxine (SYNTHROID) 75 MCG tablet Take 1 tablet (75 mcg total) by mouth once daily.    metoprolol succinate (TOPROL-XL) 25 MG 24 hr tablet Take 1 tablet (25 mg total) by mouth once daily.    nitroGLYCERIN (NITROSTAT) 0.4 MG SL tablet Place 1 tablet (0.4 mg total) under the tongue every 5 (five) minutes as needed for Chest pain. Put 1 tablet under your tongue as needed for chest pain; may take up to 3 doses 5 minutes apart. If no resolutiion of CP go to the nearest ED for evaluation.    omeprazole (PRILOSEC OTC) 20 MG tablet Take 1 tablet (20 mg total) by mouth once daily.     Family History       Problem Relation (Age of Onset)    Alcohol abuse Daughter    Cancer Mother    Diabetes Mother    Heart disease Mother, Father    Hypertension Mother, Father    Parkinsonism Sister          Tobacco Use    Smoking status: Never    Smokeless tobacco: Never   Substance and Sexual Activity    Alcohol use: Never    Drug use: Never    Sexual activity: Not Currently     Review of Systems   Constitutional:   Positive for fatigue. Negative for chills and fever.   HENT:  Negative for postnasal drip and rhinorrhea.    Eyes:  Negative for itching.   Respiratory:  Positive for shortness of breath.    Cardiovascular:  Negative for chest pain, palpitations and leg swelling.   Gastrointestinal:  Negative for abdominal distention, abdominal pain, diarrhea, nausea and vomiting.   Endocrine: Negative for cold intolerance, heat intolerance, polydipsia, polyphagia and polyuria.   Genitourinary:  Negative for dysuria and hematuria.   Musculoskeletal:  Negative for arthralgias, joint swelling, myalgias, neck pain and neck stiffness.   Skin:  Negative for pallor and rash.   Allergic/Immunologic: Negative for environmental allergies, food allergies and immunocompromised state.   Neurological:  Negative for dizziness, seizures, facial asymmetry, light-headedness and numbness.   Objective:     Vital Signs (Most Recent):  Temp: 98.5 °F (36.9 °C) (11/08/22 1440)  Pulse: (!) 122 (11/08/22 1755)  Resp: (!) 26 (11/08/22 1755)  BP: (!) 127/94 (11/08/22 1755)  SpO2: (!) 94 % (11/08/22 1755)   Vital Signs (24h Range):  Temp:  [98.5 °F (36.9 °C)] 98.5 °F (36.9 °C)  Pulse:  [] 122  Resp:  [15-27] 26  SpO2:  [94 %-99 %] 94 %  BP: (127-152)/(70-98) 127/94     Weight: 54.4 kg (120 lb)  Body mass index is 21.26 kg/m².    Physical Exam  Vitals reviewed.   Constitutional:       General: She is not in acute distress.     Appearance: Normal appearance.   HENT:      Head: Normocephalic and atraumatic.      Right Ear: External ear normal.      Left Ear: External ear normal.      Ears:      Comments: Patient is very hard of hearing  Eyes:      Extraocular Movements: Extraocular movements intact.      Pupils: Pupils are equal, round, and reactive to light.   Cardiovascular:      Rate and Rhythm: Tachycardia present. Rhythm irregular.      Pulses: Normal pulses.      Heart sounds: Normal heart sounds. No murmur heard.  Pulmonary:      Effort: Pulmonary  effort is normal. No respiratory distress.      Breath sounds: No wheezing.      Comments: Bibasal crackles R>>L  Chest:      Chest wall: No tenderness.   Abdominal:      General: Abdomen is flat.      Palpations: Abdomen is soft. There is no mass.      Tenderness: There is no abdominal tenderness. There is no right CVA tenderness or left CVA tenderness.   Musculoskeletal:         General: No swelling or tenderness. Normal range of motion.   Skin:     General: Skin is warm and dry.      Capillary Refill: Capillary refill takes less than 2 seconds.   Neurological:      Mental Status: She is alert.      Comments: Right-sided weakness LE>>UL present   Psychiatric:         Mood and Affect: Mood normal.         Thought Content: Thought content normal.     Cranial nerves 2-12 were grossly intact     Significant Labs: All pertinent labs within the past 24 hours have been reviewed.    Significant Imaging: I have reviewed all pertinent imaging results/findings within the past 24 hours.    Assessment/Plan:     * Persistent atrial fibrillation with rapid ventricular response    Reportedly, patient has not been taking diltiazem as prescribed because it made her feel bad for the past few days and presented to the emergency room with atrial fibrillation with rapid ventricular response.  Patient was also found to have developed congestive heart failure with pulmonary edema.  Will resume patient on p.o Cardizem with p.r.n. Cardizem IVP to maintain heart rate less than 110        Acute congestive heart failure    Echocardiogram approximately a year ago demonstrated intact EF with no evidence of diastolic dysfunction.  Feel that this is due to atrial fibrillation with rapid ventricular response.  Will hold beta-blocker for now and utilize increased dose of dihydropyridine CCB for rate control.  Patient will be diuresed with IV Lasix as well        Elevated troponin    Do not suspect that this represents ACS, but rather due to  atrial fibrillation with RVR/demand ischemia.  Will trend troponin levels and proceed with echocardiogram in a.m.        Cerebrovascular accident (CVA)    Patient suffered presumptive thromboembolic CVA last year with resultant right-sided weakness.  However, patient's family member stated that patient was able to walk a walker but for the past day or so patient was not able to do so secondary to generalized weakness.  Neurological examination was notable for were +3/5 RLE but otherwise unremarkable.  Even if this was a stroke, patient is not a candidate for systemic thrombolytic or percutaneous mechanical thrombectomy due to onset of symptoms greater than 24 hours.  Will proceed with MRI.  Feel that patient's right-sided weakness has been accentuated by generalized weakness associated with atrial fibrillation with RVR.      Generalized weakness    Feel that this is secondary to AFib with RVR.  Once ventricular rate has been under control, will start patient on PT/OT        VTE Risk Mitigation (From admission, onward)         Ordered     apixaban tablet 2.5 mg  2 times daily         11/08/22 8638                   Min CLOVER Gil MD  Department of Hospital Medicine   [facility] - Emergency Department

## 2022-11-09 NOTE — ASSESSMENT & PLAN NOTE
Missing doses of diltiazem at home, sometimes taking double doses due to confusion.  Son has been attempting to help with medication but is not always able  RVR likely 2/2 to missed medication doses  Overdosing CC blockade can be contributing to weakness as well if this has been occurring.

## 2022-11-09 NOTE — ASSESSMENT & PLAN NOTE
Do not suspect that this represents ACS, but rather due to atrial fibrillation with RVR/demand ischemia.  Will trend troponin levels and proceed with echocardiogram in a.m.

## 2022-11-09 NOTE — PROGRESS NOTES
[facility] - [facility]  Adult Nutrition  Progress Note    SUMMARY       Recommendations    Recommendation/Intervention: Recommend addition of Ensure Clear nutritional supplement BID to increase kcal/pro intake to promote gradual weight gain towards IBW range. Pt does not like regular Ensure but agreed to try something juice-like. Pt also receptive to trying Gelatein. Recommend addition of Gelatein q1 daily. If PO intake suboptimal, consider addition of appetite stimulant. Encourage good PO intakes.  Goals: Consume % of meals, tolerate PO intake, gradual weight gain/weight maintenance  Nutrition Goal Status: new    Assessment and Plan  Pt seen for MST2. Current weight is well below IBW range, BMI considered severely underweight per geriatric guidelines. Pt family reports that pt weighed ~112 lbs x2 weeks ago, indication ~14 lb weight loss (12% weight loss -severe). Visible muscle wasting/fat loss present. NFPE performed. Please see full assessment below.     Family also reports that pt lives alone and is not a good eating. States she usually eats maybe 1x/day and foods must be placed in front of her.     Per ASPEN guidelines, pt meeting criteria for Severe Malnutrition due to: consuming <50% estimated needs >5 days, muscle wasting present in the temporal, clavicle, deltoid, interosseous, calf regions; fat loss present in the orbital, buccal region.     Pt currently receiving a Cardiac diet, appropriate. RD offered nutritional supplements. Pt receptive to trying Ensure Clear BID + Gelatein q1 daily to increase kcal/pro intake. Encourage good PO intakes. If PO intakes remain suboptimal, consider addition of appetite stimulant as able and appropriate.     Last BM 11/7 per flowsheets. Labs/meds reviewed. Will continue to monitor. RD following.     Contributing Nutrition Diagnosis  Severe Malnutrition     Related to (etiology):   Unintentional weight loss, inadequate PO intake    Signs and  "Symptoms (as evidenced by):   14 lb weight loss x2 weeks, muscle wasting/fat loss present, BMI 17.53     Interventions/Recommendations (treatment strategy):  Recommend addition of nutritional supplements as listed above.     Nutrition Diagnosis Status:   New      Malnutrition Assessment  Malnutrition Type: acute illness or injury  Energy Intake: severe energy intake (family reports pt may eat 1 meal/day.)          Weight Loss (Malnutrition): greater than 5% in 1 month  Energy Intake (Malnutrition): less than or equal to 50% for greater than or equal to 5 days  Subcutaneous Fat (Malnutrition): moderate depletion  Muscle Mass (Malnutrition): severe depletion   Orbital Region (Subcutaneous Fat Loss): moderate depletion   Oriental orthodox Region (Muscle Loss): severe depletion  Clavicle Bone Region (Muscle Loss): severe depletion  Clavicle and Acromion Bone Region (Muscle Loss): severe depletion  Dorsal Hand (Muscle Loss): severe depletion  Posterior Calf Region (Muscle Loss): moderate depletion       Subcutaneous Fat Loss (Final Summary): moderate protein-calorie malnutrition (moderate around orbital region, severe fat loss in buccal region)  Muscle Loss Evaluation (Final Summary): severe protein-calorie malnutrition    Severe Weight Loss (Malnutrition): greater than 5% in 1 month (Family reports pt weighed ~112 lbs x2 weeks ago, indicating ~14 lb weight loss (12% weight loss - severe).)    Reason for Assessment    Reason For Assessment: identified at risk by screening criteria  Diagnosis: other (see comments) (persistent a-fib with rapid ventricular response)  Relevant Medical History: HLD, HTN, PVD, a-fib, pulmonary emboli, hpothyroidism, CAD, GERD, vit D deficiency, CVA    Nutrition Risk Screen    Nutrition Risk Screen: no indicators present    Anthropometrics    Temp: 97.7 °F (36.5 °C)  Height: 5' 3" (160 cm)  Height (inches): 63 in  Weight Method: Bed Scale  Weight: 44.9 kg (98 lb 15.8 oz)  Weight (lb): 98.99 lb  Ideal Body " Weight (IBW), Female: 115 lb  % Ideal Body Weight, Female (lb): 86.08 %  BMI (Calculated): 17.5  BMI Grade: 17 - 18.4 protein-energy malnutrition grade I       Lab/Procedures/Meds   Latest Reference Range & Units 11/09/22 02:15   Sodium 136 - 145 mmol/L 143   Potassium 3.5 - 5.1 mmol/L 3.3 (L)   Chloride 98 - 107 mmol/L 105   CO2 21 - 32 mmol/L 26   Anion Gap 7 - 16 mmol/L 15   BUN 7 - 18 mg/dL 30 (H)   Creatinine 0.55 - 1.02 mg/dL 1.06 (H)   BUN/CREAT RATIO 6 - 20  28 (H)   eGFR >=60 mL/min/1.73m² 52 (L)   Glucose 74 - 106 mg/dL 73 (L)   Calcium 8.5 - 10.1 mg/dL 9.0   (L): Data is abnormally low  (H): Data is abnormally high    Note: Low K - replete to WNL as needed. Elevated BUN, Cr and low GFR - no PMH of CKD, will monitor and adjust protein recommendations as needed. Low glucose.     Pertinent Labs Reviewed: reviewed  Pertinent Medications Reviewed: reviewed  Pertinent Medications Comments: apixaban, aspirin, atorvastatin, diltiazem, furosemide, levothyroxine, pantoprazole    Estimated/Assessed Needs    Weight Used For Calorie Calculations: 52 kg (114 lb 10.2 oz)  Energy Calorie Requirements (kcal): 8302-5054 kcals/day (30-35 kcals/kg IBW)     Protein Requirements: 63-78 g/day (1.2-1.5 g/kg IBW)  Weight Used For Protein Calculations: 52 kg (114 lb 10.2 oz)  Fluid Requirements (mL): 1-2 L/day (acute CHF) or per MD     RDA Method (mL): 1560         Nutrition Prescription Ordered    Current Diet Order: Cardiac  Nutrition Order Comments: appropriate    Evaluation of Received Nutrient/Fluid Intake       % Intake of Estimated Energy Needs: 0 - 25 %  % Meal Intake: 0 - 25 %    Nutrition Risk    Level of Risk/Frequency of Follow-up: high     Monitor and Evaluation      1. Monitor PO intake/tolerance  2. Monitor weight changes  3. Monitor labs/meds  4. Monitor POC    Nutrition Follow-Up    RD Follow-up?: Yes

## 2022-11-09 NOTE — ASSESSMENT & PLAN NOTE
Reportedly, patient has not been taking diltiazem as prescribed because it made her feel bad for the past few days and presented to the emergency room with atrial fibrillation with rapid ventricular response.  Patient was also found to have developed congestive heart failure with pulmonary edema.  Will resume patient on p.o Cardizem with p.r.n. Cardizem IVP to maintain heart rate less than 110

## 2022-11-09 NOTE — PLAN OF CARE
[facility]  Initial [facility]charge Assessment       Primary Care Provider: Mandeep Branham MD    Admission Diagnosis: Shortness of breath [R06.02]  CHF (congestive heart failure) [I50.9]  Elevated troponin [R77.8]  Atrial fibrillation with rapid ventricular response [I48.91]  Generalized weakness [R53.1]  Chest pain [R07.9]  Persistent atrial fibrillation with rapid ventricular response [I48.19]    Admission Date: 11/8/2022  Expected Discharge Date:     Discharge Barriers Identified: None    Payor: MEDICARE / Plan: MEDICARE PART A & B / Product Type: Government /     Extended Emergency Contact Information  Primary Emergency Contact: Delia Sorenson  Mobile Phone: [phone]  Relation: Relative  Preferred language: English   needed? No  Secondary Emergency Contact: ERNA MCELROY  Mobile Phone: [phone]  Relation: Relative  Preferred language: English   needed? No    Discharge Plan A: Home, Home Health  Discharge Plan B: Home, Home Health      [facility] [address] [address]  [address] [address]  [facility] [address]  Phone: [phone] Fax: [phone]      Initial Assessment (most recent)       Adult Discharge Assessment - 11/09/22 1047          Discharge Assessment    Assessment Type Discharge Planning Assessment     Source of Information family     Communicated NATY with patient/caregiver Date not available/Unable to determine     Lives With alone     Do you expect to return to your current living situation? Yes     Do you have help at home or someone to help you manage your care at home? Yes     Who are your caregiver(s) and their phone number(s)? Jenaro Sorenson (nephew/POA) [phone]     Walking or Climbing Stairs Difficulty ambulation difficulty, requires equipment     Dressing/Bathing Difficulty none     Home Accessibility wheelchair accessible     Home Layout Able to live on 1st floor     Equipment Currently Used at Home walker,  rolling     Readmission within 30 days? No     Patient currently being followed by outpatient case management? No     Do you currently have service(s) that help you manage your care at home? Yes     Name and Contact number of agency patient unsure of Home Health agency's name     Is the pt/caregiver preference to resume services with current agency Yes     Do you take prescription medications? Yes     Do you have prescription coverage? Yes     Do you have any problems affording any of your prescribed medications? No     Is the patient taking medications as prescribed? yes     How do you get to doctors appointments? family or friend will provide     Are you on dialysis? No     Do you take coumadin? No     Discharge Plan A Home;Home Health     Discharge Plan B Home;Home Health     DME Needed Upon Discharge  none     Discharge Plan discussed with: POA     Name(s) and Number(s) Jenaro Sorenson (nephew/POA) [phone]     Discharge Barriers Identified None        Physical Activity    On average, how many days per week do you engage in moderate to strenuous exercise (like a brisk walk)? 0 days     On average, how many minutes do you engage in exercise at this level? 0 min        Financial Resource Strain    How hard is it for you to pay for the very basics like food, housing, medical care, and heating? Not hard at all        Housing Stability    In the last 12 months, was there a time when you were not able to pay the mortgage or rent on time? No     In the last 12 months, how many places have you lived? 1     In the last 12 months, was there a time when you did not have a steady place to sleep or slept in a shelter (including now)? No        Transportation Needs    In the past 12 months, has lack of transportation kept you from medical appointments or from getting medications? No     In the past 12 months, has lack of transportation kept you from meetings, work, or from getting things needed for daily living? No         Food Insecurity    Within the past 12 months, you worried that your food would run out before you got the money to buy more. Never true     Within the past 12 months, the food you bought just didn't last and you didn't have money to get more. Never true        Stress    Do you feel stress - tense, restless, nervous, or anxious, or unable to sleep at night because your mind is troubled all the time - these days? Not at all        Social Connections    In a typical week, how many times do you talk on the phone with family, friends, or neighbors? More than three times a week     How often do you get together with friends or relatives? More than three times a week     How often do you attend Gnosticist or Rastafarian services? Never     Do you belong to any clubs or organizations such as Gnosticist groups, unions, fraternal or athletic groups, or school groups? No     How often do you attend meetings of the clubs or organizations you belong to? Never     Are you , , , , never , or living with a partner?         Alcohol Use    Q1: How often do you have a drink containing alcohol? Never     Q2: How many drinks containing alcohol do you have on a typical day when you are drinking? Patient does not drink     Q3: How often do you have six or more drinks on one occasion? Never                      SS discussed discharge planning with patient's nephew, Jenaro. He states he has POA but is unable to provide paperwork at this time. Patient lives at home alone and has family for support. She has a walker for home use. She is current with home health but does not know which HH company she uses. Discharge disposition will depend on patient's needs. IMM reviewed and signature obtained. SDOH complete. SS following.

## 2022-11-09 NOTE — ASSESSMENT & PLAN NOTE
Feel that this is secondary to AFib with RVR.  Once ventricular rate has been under control, will start patient on PT/OT

## 2022-11-09 NOTE — HPI
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  Patient is very hard of hearing and thus entire history of was provided by a family member in the room.  Family member stated that patient has not been taking diltiazem for the past few days stating that that medication made her feel bad there were otherwise no reported fever chills chest pain palpitation productive cough wheezing PND orthopnea or lower extremity edema in association.  On presentation, patient was normotensive but was found been AFib with RVR with heart rate as high as 170, tachypneic and mildly hypoxemic on room air.  Ensuing workup was notable for chest x-ray suggestive of a presence of pulmonary edema with bilateral pleural effusions along with significantly elevated proBNP level and a mild elevation in troponin doubt any ischemic abnormalities seen on EKG. Patient will be admitted for further evaluation and intervention

## 2022-11-09 NOTE — SUBJECTIVE & OBJECTIVE
Past Medical History:   Diagnosis Date    Atrial fibrillation     Coronary artery disease     CVA (cerebral vascular accident)     2021    GERD (gastroesophageal reflux disease)     Hyperlipidemia     Hypertension     Hypothyroidism     Pulmonary emboli     PVD (peripheral vascular disease)     Vitamin D deficiency        Past Surgical History:   Procedure Laterality Date    APPENDECTOMY      CARDIAC CATHETERIZATION Left 04/2013    CATARACT EXTRACTION         Review of patient's allergies indicates:  No Known Allergies    No current facility-administered medications on file prior to encounter.     Current Outpatient Medications on File Prior to Encounter   Medication Sig    apixaban (ELIQUIS) 2.5 mg Tab Take 1 tablet (2.5 mg total) by mouth 2 (two) times daily.    aspirin 81 MG Chew Take 1 tablet (81 mg total) by mouth once daily.    atorvastatin (LIPITOR) 40 MG tablet Take 1 tablet (40 mg total) by mouth every evening.    diltiaZEM (TIAZAC) 180 MG Cs24 TAKE ONE CAPSULE BY MOUTH ONCE DAILY    levothyroxine (SYNTHROID) 75 MCG tablet Take 1 tablet (75 mcg total) by mouth once daily.    metoprolol succinate (TOPROL-XL) 25 MG 24 hr tablet Take 1 tablet (25 mg total) by mouth once daily.    nitroGLYCERIN (NITROSTAT) 0.4 MG SL tablet Place 1 tablet (0.4 mg total) under the tongue every 5 (five) minutes as needed for Chest pain. Put 1 tablet under your tongue as needed for chest pain; may take up to 3 doses 5 minutes apart. If no resolutiion of CP go to the nearest ED for evaluation.    omeprazole (PRILOSEC OTC) 20 MG tablet Take 1 tablet (20 mg total) by mouth once daily.     Family History       Problem Relation (Age of Onset)    Alcohol abuse Daughter    Cancer Mother    Diabetes Mother    Heart disease Mother, Father    Hypertension Mother, Father    Parkinsonism Sister          Tobacco Use    Smoking status: Never    Smokeless tobacco: Never   Substance and Sexual Activity    Alcohol use: Never    Drug use: Never     Sexual activity: Not Currently     Review of Systems   Constitutional:  Positive for fatigue. Negative for chills and fever.   HENT:  Negative for postnasal drip and rhinorrhea.    Eyes:  Negative for itching.   Respiratory:  Positive for shortness of breath.    Cardiovascular:  Negative for chest pain, palpitations and leg swelling.   Gastrointestinal:  Negative for abdominal distention, abdominal pain, diarrhea, nausea and vomiting.   Endocrine: Negative for cold intolerance, heat intolerance, polydipsia, polyphagia and polyuria.   Genitourinary:  Negative for dysuria and hematuria.   Musculoskeletal:  Negative for arthralgias, joint swelling, myalgias, neck pain and neck stiffness.   Skin:  Negative for pallor and rash.   Allergic/Immunologic: Negative for environmental allergies, food allergies and immunocompromised state.   Neurological:  Negative for dizziness, seizures, facial asymmetry, light-headedness and numbness.   Objective:     Vital Signs (Most Recent):  Temp: 98.5 °F (36.9 °C) (11/08/22 1440)  Pulse: (!) 122 (11/08/22 1755)  Resp: (!) 26 (11/08/22 1755)  BP: (!) 127/94 (11/08/22 1755)  SpO2: (!) 94 % (11/08/22 1755)   Vital Signs (24h Range):  Temp:  [98.5 °F (36.9 °C)] 98.5 °F (36.9 °C)  Pulse:  [] 122  Resp:  [15-27] 26  SpO2:  [94 %-99 %] 94 %  BP: (127-152)/(70-98) 127/94     Weight: 54.4 kg (120 lb)  Body mass index is 21.26 kg/m².    Physical Exam  Vitals reviewed.   Constitutional:       General: She is not in acute distress.     Appearance: Normal appearance.   HENT:      Head: Normocephalic and atraumatic.      Right Ear: External ear normal.      Left Ear: External ear normal.      Ears:      Comments: Patient is very hard of hearing  Eyes:      Extraocular Movements: Extraocular movements intact.      Pupils: Pupils are equal, round, and reactive to light.   Cardiovascular:      Rate and Rhythm: Tachycardia present. Rhythm irregular.      Pulses: Normal pulses.      Heart sounds:  Normal heart sounds. No murmur heard.  Pulmonary:      Effort: Pulmonary effort is normal. No respiratory distress.      Breath sounds: No wheezing.      Comments: Bibasal crackles R>>L  Chest:      Chest wall: No tenderness.   Abdominal:      General: Abdomen is flat.      Palpations: Abdomen is soft. There is no mass.      Tenderness: There is no abdominal tenderness. There is no right CVA tenderness or left CVA tenderness.   Musculoskeletal:         General: No swelling or tenderness. Normal range of motion.   Skin:     General: Skin is warm and dry.      Capillary Refill: Capillary refill takes less than 2 seconds.   Neurological:      Mental Status: She is alert.      Comments: Right-sided weakness LE>>UL present   Psychiatric:         Mood and Affect: Mood normal.         Thought Content: Thought content normal.     Cranial nerves 2-12 were grossly intact     Significant Labs: All pertinent labs within the past 24 hours have been reviewed.    Significant Imaging: I have reviewed all pertinent imaging results/findings within the past 24 hours.

## 2022-11-09 NOTE — ASSESSMENT & PLAN NOTE
EF somewhat reduced on my review.  Awaiting formal read   Does not appear volume overloaded on exam  Can start GDMT pending formal read- will stop ccb if HFrEF.

## 2022-11-10 PROBLEM — I48.19 PERSISTENT ATRIAL FIBRILLATION WITH RAPID VENTRICULAR RESPONSE: Status: RESOLVED | Noted: 2022-11-08 | Resolved: 2022-11-10

## 2022-11-10 PROBLEM — R53.1 GENERALIZED WEAKNESS: Status: RESOLVED | Noted: 2022-11-08 | Resolved: 2022-11-10

## 2022-11-10 PROBLEM — I21.A1 TYPE 2 MI (MYOCARDIAL INFARCTION): Status: RESOLVED | Noted: 2022-11-09 | Resolved: 2022-11-10

## 2022-11-10 PROBLEM — I50.9 ACUTE CONGESTIVE HEART FAILURE: Status: RESOLVED | Noted: 2022-11-08 | Resolved: 2022-11-10

## 2022-11-10 LAB
ANION GAP SERPL CALCULATED.3IONS-SCNC: 15 MMOL/L (ref 7–16)
BUN SERPL-MCNC: 44 MG/DL (ref 7–18)
BUN/CREAT SERPL: 32 (ref 6–20)
CALCIUM SERPL-MCNC: 8.6 MG/DL (ref 8.5–10.1)
CHLORIDE SERPL-SCNC: 101 MMOL/L (ref 98–107)
CO2 SERPL-SCNC: 28 MMOL/L (ref 21–32)
CREAT SERPL-MCNC: 1.38 MG/DL (ref 0.55–1.02)
EGFR (NO RACE VARIABLE) (RUSH/TITUS): 38 ML/MIN/1.73M²
GLUCOSE SERPL-MCNC: 80 MG/DL (ref 74–106)
POTASSIUM SERPL-SCNC: 3.8 MMOL/L (ref 3.5–5.1)
SODIUM SERPL-SCNC: 140 MMOL/L (ref 136–145)
UA COMPLETE W REFLEX CULTURE PNL UR: ABNORMAL

## 2022-11-10 PROCEDURE — 99232 PR SUBSEQUENT HOSPITAL CARE,LEVL II: ICD-10-PCS | Mod: ,,, | Performed by: STUDENT IN AN ORGANIZED HEALTH CARE EDUCATION/TRAINING PROGRAM

## 2022-11-10 PROCEDURE — 97116 GAIT TRAINING THERAPY: CPT

## 2022-11-10 PROCEDURE — 80048 BASIC METABOLIC PNL TOTAL CA: CPT | Performed by: INTERNAL MEDICINE

## 2022-11-10 PROCEDURE — 30200315 PPD INTRADERMAL TEST REV CODE 302: Performed by: STUDENT IN AN ORGANIZED HEALTH CARE EDUCATION/TRAINING PROGRAM

## 2022-11-10 PROCEDURE — 36415 COLL VENOUS BLD VENIPUNCTURE: CPT | Performed by: INTERNAL MEDICINE

## 2022-11-10 PROCEDURE — 11000001 HC ACUTE MED/SURG PRIVATE ROOM

## 2022-11-10 PROCEDURE — 97110 THERAPEUTIC EXERCISES: CPT

## 2022-11-10 PROCEDURE — 99232 SBSQ HOSP IP/OBS MODERATE 35: CPT | Mod: ,,, | Performed by: STUDENT IN AN ORGANIZED HEALTH CARE EDUCATION/TRAINING PROGRAM

## 2022-11-10 PROCEDURE — 25000003 PHARM REV CODE 250: Performed by: INTERNAL MEDICINE

## 2022-11-10 PROCEDURE — 86580 TB INTRADERMAL TEST: CPT | Performed by: STUDENT IN AN ORGANIZED HEALTH CARE EDUCATION/TRAINING PROGRAM

## 2022-11-10 RX ORDER — DILTIAZEM HYDROCHLORIDE 60 MG/1
60 TABLET, FILM COATED ORAL EVERY 6 HOURS
Qty: 120 TABLET | Refills: 11 | Status: SHIPPED | OUTPATIENT
Start: 2022-11-10 | End: 2024-02-20

## 2022-11-10 RX ADMIN — DILTIAZEM HYDROCHLORIDE 60 MG: 60 TABLET, FILM COATED ORAL at 08:11

## 2022-11-10 RX ADMIN — APIXABAN 2.5 MG: 2.5 TABLET, FILM COATED ORAL at 08:11

## 2022-11-10 RX ADMIN — LEVOTHYROXINE SODIUM 75 MCG: 75 TABLET ORAL at 09:11

## 2022-11-10 RX ADMIN — ATORVASTATIN CALCIUM 40 MG: 40 TABLET, FILM COATED ORAL at 08:11

## 2022-11-10 RX ADMIN — TUBERCULIN PURIFIED PROTEIN DERIVATIVE 5 UNITS: 5 INJECTION, SOLUTION INTRADERMAL at 05:11

## 2022-11-10 RX ADMIN — ASPIRIN 81 MG 81 MG: 81 TABLET ORAL at 09:11

## 2022-11-10 RX ADMIN — PANTOPRAZOLE SODIUM 40 MG: 40 TABLET, DELAYED RELEASE ORAL at 09:11

## 2022-11-10 RX ADMIN — DILTIAZEM HYDROCHLORIDE 60 MG: 60 TABLET, FILM COATED ORAL at 03:11

## 2022-11-10 RX ADMIN — DILTIAZEM HYDROCHLORIDE 60 MG: 60 TABLET, FILM COATED ORAL at 09:11

## 2022-11-10 RX ADMIN — APIXABAN 2.5 MG: 2.5 TABLET, FILM COATED ORAL at 09:11

## 2022-11-10 RX ADMIN — DILTIAZEM HYDROCHLORIDE 60 MG: 60 TABLET, FILM COATED ORAL at 01:11

## 2022-11-10 NOTE — PLAN OF CARE
1125 SS received consult for home health. SS spoke with patient's nephew, Jenaro, he states patient is current with [facility]. Choice obtained to resume with [facility]. He mentioned that the doctor mentioned [facility] to him. SS discussed with Dr. Hanna, SS will proceed with [facility] referral. Choice obtained for [facility] in [place]. SS sent referral to Traci at [facility] via secure chat. SS also left voicemail for Traci at this time to discuss. SS awaiting call back.     1152 SS spoke with Traci at [facility], she has received referral and is looking to see if they can accept patient today. She states she will call me back.     1330 SS spoke with Traci at [facility], they are unable to accept today d/t facility at capacity. SS spoke with patient's nephew, Jenaro, choice obtained for [facility] and [facility]. Referrals faxed at this time.     1420 SS spoke with Joe at [facility], they do not have any beds available at this time. SS spoke with Luanne at [facility], she states she will review and they may be able to accept patient tomorrow. She is going to call me back.     1530 SS spoke with Luanne at [facility]. They are able to accept patient tomorrow. Patient will need a TB skin test and she will have to be there by noon tomorrow. SS notified Dr. Hanna of the above. SS notified floor nurse, Raúl, of cutoff time. Packet made.

## 2022-11-10 NOTE — DISCHARGE SUMMARY
[facility] - [address] [place] [address] Medicine  Discharge Summary      Patient Name: Abdias Carver  MRN: [id number]  DIOMEDES: [id number]  Patient Class: IP- Inpatient  Admission Date: 11/8/2022  Hospital Length of Stay: 2 days  Discharge Date and Time:  11/10/2022 11:20 AM  Attending Physician: Kaz Hanna DO   Discharging Provider: Kaz Hanna DO  Primary Care Provider: Mandeep Branham MD    Primary Care Team: Networked reference to record PCT     HPI:   Patient is on 82-year-old female with a history of essential hypertension, hypothyroidism, pulmonary thromboembolism, CKD stage 3, longstanding persistent atrial fibrillation on rate control and Eliquis anticoagulation in the setting of mild nonobstructive CAD with preserved EF, and 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  Patient is very hard of hearing and thus entire history of was provided by a family member in the room.  Family member stated that patient has not been taking diltiazem for the past few days stating that that medication made her feel bad there were otherwise no reported fever chills chest pain palpitation productive cough wheezing PND orthopnea or lower extremity edema in association.  On presentation, patient was normotensive but was found been AFib with RVR with heart rate as high as 170, tachypneic and mildly hypoxemic on room air.  Ensuing workup was notable for chest x-ray suggestive of a presence of pulmonary edema with bilateral pleural effusions along with significantly elevated proBNP level and a mild elevation in troponin doubt any ischemic abnormalities seen on EKG. Patient will be admitted for further evaluation and intervention      * No surgery found *      Hospital Course:   11/9 mild confusion. Feels better. Son in room, says she looks much better  11/10- much improved today.  Stable for  discharge.  Swingbed when approved.  Contineu Dilt, stop home metoprolol. Follow up with PCP.       Goals of Care Treatment Preferences:  Code Status: DNR      Consults:   Consults (From admission, onward)        Status Ordering Provider     Inpatient consult to Social Work  Once        Provider:  (Not yet assigned)    Ordered CESARIO RAMIREZ          Cerebrovascular accident (CVA)  MRI with no acute findings  Still with weakness, DC to swingbed.       Final Active Diagnoses:    Diagnosis Date Noted POA    Cerebrovascular accident (CVA) [I63.9] 08/13/2021 Yes      Problems Resolved During this Admission:    Diagnosis Date Noted Date Resolved POA    PRINCIPAL PROBLEM:  Persistent atrial fibrillation with rapid ventricular response [I48.19] 11/08/2022 11/10/2022 Yes    Type 2 MI (myocardial infarction) [I21.A1] 11/09/2022 11/10/2022 Yes    Generalized weakness [R53.1] 11/08/2022 11/10/2022 Yes    Elevated troponin [R77.8] 11/08/2022 11/09/2022 Yes    Acute congestive heart failure [I50.9] 11/08/2022 11/10/2022 Yes       Discharged Condition: good    Disposition: Home-Health Care [facility]    Follow Up:   Follow-up Information     Mandeep Branham MD. Schedule an appointment as soon as possible for a visit on 11/17/2022.    Specialty: Internal Medicine  Why: 8:00 am  Contact information:  [address] [facility] [address]  [phone]                       Patient Instructions:      Diet Adult Regular     Activity as tolerated       Significant Diagnostic Studies: Labs: All labs within the past 24 hours have been reviewed    Pending Diagnostic Studies:     Procedure Component Value Units Date/Time    EXTRA TUBES [[id number]] Collected: 11/09/22 0519    Order Status: Sent Lab Status: In process Updated: 11/09/22 0519    Specimen: Blood, Venous     Narrative:      The following orders were created for panel order EXTRA TUBES.  Procedure                               Abnormality          Status                     ---------                               -----------         ------                     Lavender Top Hold[[id number]]                                In process                   Please view results for these tests on the individual orders.    EXTRA TUBES [[id number]] Collected: 11/08/22 2154    Order Status: Sent Lab Status: In process Updated: 11/08/22 2154    Specimen: Blood, Venous     Narrative:      The following orders were created for panel order EXTRA TUBES.  Procedure                               Abnormality         Status                     ---------                               -----------         ------                     Lavender Top Hold[[id number]]                                In process                   Please view results for these tests on the individual orders.         Medications:  Reconciled Home Medications:      Medication List      START taking these medications    diltiaZEM 60 MG tablet  Commonly known as: CARDIZEM  Take 1 tablet (60 mg total) by mouth every 6 (six) hours.  Replaces: diltiaZEM 180 MG Cs24        CONTINUE taking these medications    apixaban 2.5 mg Tab  Commonly known as: ELIQUIS  Take 1 tablet (2.5 mg total) by mouth 2 (two) times daily.     aspirin 81 MG Chew  Take 1 tablet (81 mg total) by mouth once daily.     atorvastatin 40 MG tablet  Commonly known as: LIPITOR  Take 1 tablet (40 mg total) by mouth every evening.     levothyroxine 75 MCG tablet  Commonly known as: SYNTHROID  Take 1 tablet (75 mcg total) by mouth once daily.     nitroGLYCERIN 0.4 MG SL tablet  Commonly known as: NITROSTAT  Place 1 tablet (0.4 mg total) under the tongue every 5 (five) minutes as needed for Chest pain. Put 1 tablet under your tongue as needed for chest pain; may take up to 3 doses 5 minutes apart. If no resolutiion of CP go to the nearest ED for evaluation.     omeprazole 20 MG tablet  Commonly known as: PriLOSEC OTC  Take 1 tablet (20 mg total) by  mouth once daily.        STOP taking these medications    diltiaZEM 180 MG Cs24  Commonly known as: TIAZAC  Replaced by: diltiaZEM 60 MG tablet     metoprolol succinate 25 MG 24 hr tablet  Commonly known as: TOPROL-XL            Indwelling Lines/Drains at time of discharge:   Lines/Drains/Airways     None                 Time spent on the discharge of patient: >30 minutes         Kaz Hanna DO  Department of Hospital Medicine  [facility] - [facility]

## 2022-11-10 NOTE — DISCHARGE INSTRUCTIONS
Take medications as ordered, keep follow up appointments, return with any new or worsening symptoms

## 2022-11-10 NOTE — SUBJECTIVE & OBJECTIVE
Interval History: NAEO     Review of Systems   Constitutional:  Negative for chills, fatigue, fever and unexpected weight change.   HENT:  Negative for congestion, mouth sores and sore throat.    Eyes:  Negative for photophobia and visual disturbance.   Respiratory:  Negative for cough, chest tightness, shortness of breath and wheezing.    Cardiovascular:  Negative for chest pain, palpitations and leg swelling.   Gastrointestinal:  Negative for abdominal pain, diarrhea, nausea and vomiting.   Endocrine: Negative for cold intolerance and heat intolerance.   Genitourinary:  Negative for difficulty urinating, dysuria, frequency and urgency.   Musculoskeletal:  Negative for arthralgias, back pain and myalgias.   Skin:  Negative for pallor and rash.   Neurological:  Positive for weakness. Negative for tremors, seizures, syncope, numbness and headaches.   Hematological:  Does not bruise/bleed easily.   Psychiatric/Behavioral:  Negative for agitation, confusion, hallucinations and suicidal ideas.    All other systems reviewed and are negative.  Objective:     Vital Signs (Most Recent):  Temp: 98.3 °F (36.8 °C) (11/10/22 1100)  Pulse: 91 (11/10/22 1100)  Resp: 16 (11/10/22 1100)  BP: 98/64 (11/10/22 1100)  SpO2: 96 % (11/10/22 1100)   Vital Signs (24h Range):  Temp:  [97.6 °F (36.4 °C)-98.3 °F (36.8 °C)] 98.3 °F (36.8 °C)  Pulse:  [59-91] 91  Resp:  [16-19] 16  SpO2:  [94 %-97 %] 96 %  BP: ()/(57-73) 98/64     Weight: 44.1 kg (97 lb 3.6 oz)  Body mass index is 17.22 kg/m².  No intake or output data in the 24 hours ending 11/10/22 1502     Physical Exam  Vitals reviewed.   Constitutional:       Appearance: Normal appearance.   HENT:      Head: Normocephalic and atraumatic.   Eyes:      Extraocular Movements: Extraocular movements intact.   Cardiovascular:      Rate and Rhythm: Normal rate. Rhythm irregular.      Pulses: Normal pulses.   Pulmonary:      Effort: Pulmonary effort is normal.      Breath sounds: Normal  breath sounds.   Abdominal:      General: Abdomen is flat.      Palpations: Abdomen is soft.   Musculoskeletal:      Comments: Thin, moves all extremities   Skin:     General: Skin is warm and dry.      Capillary Refill: Capillary refill takes less than 2 seconds.   Neurological:      General: No focal deficit present.      Mental Status: She is alert. She is disoriented.   Psychiatric:         Mood and Affect: Mood normal.         Behavior: Behavior normal.       Significant Labs: All pertinent labs within the past 24 hours have been reviewed.    Significant Imaging: I have reviewed all pertinent imaging results/findings within the past 24 hours.

## 2022-11-10 NOTE — PROGRESS NOTES
[facility] - [address] [place] [address] Medicine  Progress Note    Patient Name: Abdias Carver  MRN: [id number]  Patient Class: IP- Inpatient   Admission Date: 11/8/2022  Length of Stay: 2 days  Attending Physician: Kaz Hanna DO  Primary Care Provider: Mandeep Branham MD        Subjective:     Principal Problem:Persistent atrial fibrillation with rapid ventricular response        HPI:  Patient is on 82-year-old female with a history of essential hypertension, hypothyroidism, pulmonary thromboembolism, CKD stage 3, longstanding persistent atrial fibrillation on rate control and Eliquis anticoagulation in the setting of mild nonobstructive CAD with preserved EF, and 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  Patient is very hard of hearing and thus entire history of was provided by a family member in the room.  Family member stated that patient has not been taking diltiazem for the past few days stating that that medication made her feel bad there were otherwise no reported fever chills chest pain palpitation productive cough wheezing PND orthopnea or lower extremity edema in association.  On presentation, patient was normotensive but was found been AFib with RVR with heart rate as high as 170, tachypneic and mildly hypoxemic on room air.  Ensuing workup was notable for chest x-ray suggestive of a presence of pulmonary edema with bilateral pleural effusions along with significantly elevated proBNP level and a mild elevation in troponin doubt any ischemic abnormalities seen on EKG. Patient will be admitted for further evaluation and intervention      Overview/Hospital Course:  11/9 mild confusion. Feels better. Son in room, says she looks much better  11/10- much improved today.  Stable for discharge.  Swingbed when approved.  Contineu Dilt, stop home metoprolol. Follow up with  PCP.  11/10- Unable to be approved for swingbed today. Cancel Dc. Dc in AM.      Interval History: NAEO     Review of Systems   Constitutional:  Negative for chills, fatigue, fever and unexpected weight change.   HENT:  Negative for congestion, mouth sores and sore throat.    Eyes:  Negative for photophobia and visual disturbance.   Respiratory:  Negative for cough, chest tightness, shortness of breath and wheezing.    Cardiovascular:  Negative for chest pain, palpitations and leg swelling.   Gastrointestinal:  Negative for abdominal pain, diarrhea, nausea and vomiting.   Endocrine: Negative for cold intolerance and heat intolerance.   Genitourinary:  Negative for difficulty urinating, dysuria, frequency and urgency.   Musculoskeletal:  Negative for arthralgias, back pain and myalgias.   Skin:  Negative for pallor and rash.   Neurological:  Positive for weakness. Negative for tremors, seizures, syncope, numbness and headaches.   Hematological:  Does not bruise/bleed easily.   Psychiatric/Behavioral:  Negative for agitation, confusion, hallucinations and suicidal ideas.    All other systems reviewed and are negative.  Objective:     Vital Signs (Most Recent):  Temp: 98.3 °F (36.8 °C) (11/10/22 1100)  Pulse: 91 (11/10/22 1100)  Resp: 16 (11/10/22 1100)  BP: 98/64 (11/10/22 1100)  SpO2: 96 % (11/10/22 1100)   Vital Signs (24h Range):  Temp:  [97.6 °F (36.4 °C)-98.3 °F (36.8 °C)] 98.3 °F (36.8 °C)  Pulse:  [59-91] 91  Resp:  [16-19] 16  SpO2:  [94 %-97 %] 96 %  BP: ()/(57-73) 98/64     Weight: 44.1 kg (97 lb 3.6 oz)  Body mass index is 17.22 kg/m².  No intake or output data in the 24 hours ending 11/10/22 1502     Physical Exam  Vitals reviewed.   Constitutional:       Appearance: Normal appearance.   HENT:      Head: Normocephalic and atraumatic.   Eyes:      Extraocular Movements: Extraocular movements intact.   Cardiovascular:      Rate and Rhythm: Normal rate. Rhythm irregular.      Pulses: Normal pulses.    Pulmonary:      Effort: Pulmonary effort is normal.      Breath sounds: Normal breath sounds.   Abdominal:      General: Abdomen is flat.      Palpations: Abdomen is soft.   Musculoskeletal:      Comments: Thin, moves all extremities   Skin:     General: Skin is warm and dry.      Capillary Refill: Capillary refill takes less than 2 seconds.   Neurological:      General: No focal deficit present.      Mental Status: She is alert. She is disoriented.   Psychiatric:         Mood and Affect: Mood normal.         Behavior: Behavior normal.       Significant Labs: All pertinent labs within the past 24 hours have been reviewed.    Significant Imaging: I have reviewed all pertinent imaging results/findings within the past 24 hours.      Assessment/Plan:      Cerebrovascular accident (CVA)  MRI with no acute findings  Still with weakness, DC to swingbed.       VTE Risk Mitigation (From admission, onward)         Ordered     apixaban tablet 2.5 mg  2 times daily         11/08/22 1828     Place sequential compression device  Until discontinued         11/08/22 2025                Discharge Planning   NATY: 11/10/2022     Code Status: DNR   Is the patient medically ready for discharge?:     Reason for patient still in hospital (select all that apply): Treatment  Discharge Plan A: Home, Home Health                  Kaz Hanna DO  Department of Hospital Medicine   [facility]

## 2022-11-10 NOTE — PT/OT/SLP PROGRESS
Physical Therapy Treatment    Patient Name:  Abdias Carver   MRN:  [id number]    Recommendations:     Discharge Recommendations:  nursing facility, skilled, rehabilitation facility   Discharge Equipment Recommendations: other (see comments) (to be determined)   Barriers to discharge: Inaccessible home and Decreased caregiver support    Assessment:     Abdias Carver is a 82 y.o. female admitted with a medical diagnosis of Persistent atrial fibrillation with rapid ventricular response.  She presents with the following impairments/functional limitations:  weakness, impaired self care skills, impaired functional mobility, gait instability, impaired balance, impaired cognition, decreased coordination, decreased lower extremity function, decreased safety awareness Pt is unable to maintain upright sitting position without assistance and leans heavily to right side even while lying in bed. She has RLE weakness and requires assistance with weightshift to left side in order to advance RLE during ambulation. She is not safe to return home at this point.    Rehab Prognosis: Fair; patient would benefit from acute skilled PT services to address these deficits and reach maximum level of function.    Recent Surgery: * No surgery found *      Plan:     During this hospitalization, patient to be seen 5 x/week to address the identified rehab impairments via gait training, therapeutic activities, therapeutic exercises and progress toward the following goals:    Plan of Care Expires:  12/09/22    Subjective     Chief Complaint: right weakness  Patient/Family Comments/goals: Pt agreeable to PT  Pain/Comfort:  Pain Rating 1: 0/10  Pain Rating Post-Intervention 1: 0/10      Objective:     Communicated with RAFAEL Gonzales LPN prior to session.  Patient found supine with peripheral IV, telemetry, PureWick upon PT entry to room.     General Precautions: Standard, fall   Orthopedic Precautions:N/A   Braces: N/A  Respiratory Status: Room  air     Functional Mobility:  Bed Mobility:     Scooting: maximal assistance  Supine to Sit: moderate assistance  Transfers:     Sit to Stand:  minimum assistance with rolling walker  Bed to Chair: moderate assistance with  rolling walker  using  Step Transfer  Gait: 30 ft with minimal assistance using rolling walker, then 40 ft with minimal assistance, step to pattern, assistance required to facilitate weight shift to left to advance RLE  Balance: poor      AM-PAC 6 CLICK MOBILITY  Turning over in bed (including adjusting bedclothes, sheets and blankets)?: 3  Sitting down on and standing up from a chair with arms (e.g., wheelchair, bedside commode, etc.): 3  Moving from lying on back to sitting on the side of the bed?: 3  Moving to and from a bed to a chair (including a wheelchair)?: 3  Need to walk in hospital room?: 3  Climbing 3-5 steps with a railing?: 2  Basic Mobility Total Score: 17       Treatment & Education:  Pt performed bilateral LE: ankle pumps, Long arc quads, and Marching x 20 each  Pt assisted with bed mobility with minimal assistance to moderate assistance       Patient left up in chair with all lines intact and call button in reach..    GOALS:   Multidisciplinary Problems       Physical Therapy Goals          Problem: Physical Therapy    Goal Priority Disciplines Outcome Goal Variances Interventions   Physical Therapy Goal     PT, PT/OT Ongoing, Progressing     Description: Short term goals:  1. Supine to sit with Contact Guard Assistance  2. Bed to chair transfer with Contact Guard Assistance using Rolling Walker  3. Gait  x 100 feet with Contact Guard Assistance using Rolling Walker.     Long term goals:  1. Supine to sit with Modified [place]  2. Bed to chair transfer with Modified [place] using Rolling Walker  3. Gait  x 200 feet with Modified [place] using Rolling Walker.                        Time Tracking:     PT Received On: 11/10/22  PT Start Time: 1121     PT Stop Time:  1148  PT Total Time (min): 27 min     Billable Minutes: Gait Training 15 and Therapeutic Exercise 12    Treatment Type: Treatment  PT/PTA: PT     PTA Visit Number: 0     11/10/2022

## 2022-11-11 VITALS
OXYGEN SATURATION: 98 % | WEIGHT: 97.25 LBS | BODY MASS INDEX: 17.23 KG/M2 | RESPIRATION RATE: 18 BRPM | TEMPERATURE: 98 F | SYSTOLIC BLOOD PRESSURE: 102 MMHG | HEART RATE: 70 BPM | HEIGHT: 63 IN | DIASTOLIC BLOOD PRESSURE: 61 MMHG

## 2022-11-11 PROBLEM — N39.0 UTI (URINARY TRACT INFECTION): Status: ACTIVE | Noted: 2022-11-11

## 2022-11-11 LAB
ANION GAP SERPL CALCULATED.3IONS-SCNC: 9 MMOL/L (ref 7–16)
BUN SERPL-MCNC: 43 MG/DL (ref 7–18)
BUN/CREAT SERPL: 39 (ref 6–20)
CALCIUM SERPL-MCNC: 8.9 MG/DL (ref 8.5–10.1)
CHLORIDE SERPL-SCNC: 103 MMOL/L (ref 98–107)
CO2 SERPL-SCNC: 27 MMOL/L (ref 21–32)
CREAT SERPL-MCNC: 1.11 MG/DL (ref 0.55–1.02)
EGFR (NO RACE VARIABLE) (RUSH/TITUS): 49 ML/MIN/1.73M²
GLUCOSE SERPL-MCNC: 79 MG/DL (ref 74–106)
POTASSIUM SERPL-SCNC: 3.4 MMOL/L (ref 3.5–5.1)
SODIUM SERPL-SCNC: 136 MMOL/L (ref 136–145)

## 2022-11-11 PROCEDURE — 25000003 PHARM REV CODE 250: Performed by: INTERNAL MEDICINE

## 2022-11-11 PROCEDURE — 36415 COLL VENOUS BLD VENIPUNCTURE: CPT | Performed by: INTERNAL MEDICINE

## 2022-11-11 PROCEDURE — 99239 PR HOSPITAL DISCHARGE DAY,>30 MIN: ICD-10-PCS | Mod: ,,, | Performed by: STUDENT IN AN ORGANIZED HEALTH CARE EDUCATION/TRAINING PROGRAM

## 2022-11-11 PROCEDURE — 80048 BASIC METABOLIC PNL TOTAL CA: CPT | Performed by: INTERNAL MEDICINE

## 2022-11-11 PROCEDURE — 25000003 PHARM REV CODE 250: Performed by: STUDENT IN AN ORGANIZED HEALTH CARE EDUCATION/TRAINING PROGRAM

## 2022-11-11 PROCEDURE — 99239 HOSP IP/OBS DSCHRG MGMT >30: CPT | Mod: ,,, | Performed by: STUDENT IN AN ORGANIZED HEALTH CARE EDUCATION/TRAINING PROGRAM

## 2022-11-11 RX ORDER — CEPHALEXIN 500 MG/1
500 CAPSULE ORAL EVERY 6 HOURS
Qty: 28 CAPSULE | Refills: 0 | Status: SHIPPED | OUTPATIENT
Start: 2022-11-11 | End: 2023-10-25 | Stop reason: ALTCHOICE

## 2022-11-11 RX ORDER — POTASSIUM CHLORIDE 20 MEQ/1
40 TABLET, EXTENDED RELEASE ORAL ONCE
Status: COMPLETED | OUTPATIENT
Start: 2022-11-11 | End: 2022-11-11

## 2022-11-11 RX ADMIN — DILTIAZEM HYDROCHLORIDE 60 MG: 60 TABLET, FILM COATED ORAL at 09:11

## 2022-11-11 RX ADMIN — PANTOPRAZOLE SODIUM 40 MG: 40 TABLET, DELAYED RELEASE ORAL at 09:11

## 2022-11-11 RX ADMIN — APIXABAN 2.5 MG: 2.5 TABLET, FILM COATED ORAL at 09:11

## 2022-11-11 RX ADMIN — POTASSIUM CHLORIDE 40 MEQ: 1500 TABLET, EXTENDED RELEASE ORAL at 09:11

## 2022-11-11 RX ADMIN — DILTIAZEM HYDROCHLORIDE 60 MG: 60 TABLET, FILM COATED ORAL at 02:11

## 2022-11-11 RX ADMIN — LEVOTHYROXINE SODIUM 75 MCG: 75 TABLET ORAL at 09:11

## 2022-11-11 RX ADMIN — ASPIRIN 81 MG 81 MG: 81 TABLET ORAL at 09:11

## 2022-11-11 NOTE — NURSING
Report called to [facility] spoke with nurse Luanne she informed me that pt nephew was on his way to sign papers and he will return to the hospital to take pt the [facility]. It is noted that pt will need to be there by 11A.M.

## 2022-11-11 NOTE — DISCHARGE SUMMARY
[facility] - [address] [place] [address] Medicine  Discharge Summary      Patient Name: Abdias Carver  MRN: [id number]  DIOMEDES: [id number]  Patient Class: IP- Inpatient  Admission Date: 11/8/2022  Hospital Length of Stay: 3 days  Discharge Date and Time:  11/11/2022 6:48 AM  Attending Physician: Kaz Hanna DO   Discharging Provider: Kaz Hanna DO  Primary Care Provider: Mandeep Branham MD    Primary Care Team: Networked reference to record PCT     HPI:   Patient is on 82-year-old female with a history of essential hypertension, hypothyroidism, pulmonary thromboembolism, CKD stage 3, longstanding persistent atrial fibrillation on rate control and Eliquis anticoagulation in the setting of mild nonobstructive CAD with preserved EF, and 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  Patient is very hard of hearing and thus entire history of was provided by a family member in the room.  Family member stated that patient has not been taking diltiazem for the past few days stating that that medication made her feel bad there were otherwise no reported fever chills chest pain palpitation productive cough wheezing PND orthopnea or lower extremity edema in association.  On presentation, patient was normotensive but was found been AFib with RVR with heart rate as high as 170, tachypneic and mildly hypoxemic on room air.  Ensuing workup was notable for chest x-ray suggestive of a presence of pulmonary edema with bilateral pleural effusions along with significantly elevated proBNP level and a mild elevation in troponin doubt any ischemic abnormalities seen on EKG. Patient will be admitted for further evaluation and intervention      * No surgery found *      Hospital Course:   11/9 mild confusion. Feels better. Son in room, says she looks much better  11/10- much improved today.  Stable for  discharge.  Swingbed when approved.  Contineu Dilt, stop home metoprolol. Follow up with PCP.  11/10- Unable to be approved for swingbed today. Cancel Dc. Dc in AM.  11/11- placement found today, stable for discharge.  Urine cultures resulted pan sensitive ecoli.  Keflex for 7 days       Goals of Care Treatment Preferences:  Code Status: DNR      Consults:   Consults (From admission, onward)        Status Ordering Provider     Inpatient consult to Social Work  Once        Provider:  (Not yet assigned)    CESARIO Webster          UTI (urinary tract infection)  Ngo sensitive, Keflex for 7 days      Cerebrovascular accident (CVA)  MRI with no acute findings  Still with weakness, DC to swingbed.       Final Active Diagnoses:    Diagnosis Date Noted POA    UTI (urinary tract infection) [N39.0] 11/11/2022 Yes    Cerebrovascular accident (CVA) [I63.9] 08/13/2021 Yes      Problems Resolved During this Admission:    Diagnosis Date Noted Date Resolved POA    PRINCIPAL PROBLEM:  Persistent atrial fibrillation with rapid ventricular response [I48.19] 11/08/2022 11/10/2022 Yes    Type 2 MI (myocardial infarction) [I21.A1] 11/09/2022 11/10/2022 Yes    Generalized weakness [R53.1] 11/08/2022 11/10/2022 Yes    Elevated troponin [R77.8] 11/08/2022 11/09/2022 Yes    Acute congestive heart failure [I50.9] 11/08/2022 11/10/2022 Yes       Discharged Condition: good    Disposition: Home-Health Care [facility]    Follow Up:   Follow-up Information     Mandeep Branham MD. Schedule an appointment as soon as possible for a visit on 11/17/2022.    Specialty: Internal Medicine  Why: 8:00 am  Contact information:  [address] [facility] [place] [address]  [phone]                       Patient Instructions:      Diet Adult Regular     Activity as tolerated       Significant Diagnostic Studies: Labs: All labs within the past 24 hours have been reviewed    Pending Diagnostic Studies:      Procedure Component Value Units Date/Time    EXTRA TUBES [[id number]] Collected: 11/09/22 0519    Order Status: Sent Lab Status: In process Updated: 11/09/22 0519    Specimen: Blood, Venous     Narrative:      The following orders were created for panel order EXTRA TUBES.  Procedure                               Abnormality         Status                     ---------                               -----------         ------                     Lavender Top Hold[[id number]]                                In process                   Please view results for these tests on the individual orders.    EXTRA TUBES [[id number]] Collected: 11/08/22 2154    Order Status: Sent Lab Status: In process Updated: 11/08/22 2154    Specimen: Blood, Venous     Narrative:      The following orders were created for panel order EXTRA TUBES.  Procedure                               Abnormality         Status                     ---------                               -----------         ------                     Lavender Top Hold[[id number]]                                In process                   Please view results for these tests on the individual orders.         Medications:  Reconciled Home Medications:      Medication List      START taking these medications    cephALEXin 500 MG capsule  Commonly known as: KEFLEX  Take 1 capsule (500 mg total) by mouth every 6 (six) hours.     diltiaZEM 60 MG tablet  Commonly known as: CARDIZEM  Take 1 tablet (60 mg total) by mouth every 6 (six) hours.  Replaces: diltiaZEM 180 MG Cs24        CONTINUE taking these medications    apixaban 2.5 mg Tab  Commonly known as: ELIQUIS  Take 1 tablet (2.5 mg total) by mouth 2 (two) times daily.     aspirin 81 MG Chew  Take 1 tablet (81 mg total) by mouth once daily.     atorvastatin 40 MG tablet  Commonly known as: LIPITOR  Take 1 tablet (40 mg total) by mouth every evening.     levothyroxine 75 MCG tablet  Commonly known as: SYNTHROID  Take 1 tablet (75 mcg  total) by mouth once daily.     nitroGLYCERIN 0.4 MG SL tablet  Commonly known as: NITROSTAT  Place 1 tablet (0.4 mg total) under the tongue every 5 (five) minutes as needed for Chest pain. Put 1 tablet under your tongue as needed for chest pain; may take up to 3 doses 5 minutes apart. If no resolutiion of CP go to the nearest ED for evaluation.     omeprazole 20 MG tablet  Commonly known as: PriLOSEC OTC  Take 1 tablet (20 mg total) by mouth once daily.        STOP taking these medications    diltiaZEM 180 MG Cs24  Commonly known as: TIAZAC  Replaced by: diltiaZEM 60 MG tablet     metoprolol succinate 25 MG 24 hr tablet  Commonly known as: TOPROL-XL            Indwelling Lines/Drains at time of discharge:   Lines/Drains/Airways     None                 Time spent on the discharge of patient: >30 minutes         Kaz Hanna DO  Department of Hospital Medicine  [facility] - [address] [place] [address]

## 2022-11-11 NOTE — PLAN OF CARE
[facility] - [facility] Telemetry  Discharge Final Note    Primary Care Provider: Mandeep Branham MD    Expected Discharge Date: 11/11/2022    Final Discharge Note (most recent)       Final Note - 11/11/22 0904          Final Note    Assessment Type Final Discharge Note     Anticipated Discharge Disposition Skilled Nursing Facility     What phone number can be called within the next 1-3 days to see how you are doing after discharge? [phone]        Post-Acute Status    Post-Acute Authorization Placement     Post-Acute Placement Status Set-up Complete/Auth obtained     Patient choice form signed by patient/caregiver List from CMS Compare;List with quality metrics by geographic area provided;List from System Post-Acute Care     Discharge Delays None known at this time                   SS spoke with Luanne at [facility], patient's nephew has completed paperwork and patient can be admitted today. Discharge information faxed at this time. Patient's family is providing transportation. Floor nurse, Raúl, aware of noon cutoff time.     Important Message from Medicare  Important Message from Medicare regarding Discharge Appeal Rights: Given to patient/caregiver, Signed/date by patient/caregiver, Explained to patient/caregiver     Date IMM was signed: 11/11/22  Time IMM was signed: 0905     Follow-up providers       Mandeep Branham MD   Specialty: Internal Medicine   Relationship: PCP - General    [address] [facility] [place] [address]   Phone: [phone]       Next Steps: Schedule an appointment as soon as possible for a visit on 11/17/2022    Instructions: 8:00 am              After-discharge care                Destination       [facility]   Service: Skilled Nursing    [address] [place] [address] [place] [address]   Phone: [phone]

## 2022-11-30 ENCOUNTER — EXTERNAL CHRONIC CARE MANAGEMENT (OUTPATIENT)
Dept: FAMILY MEDICINE | Facility: CLINIC | Age: 82
End: 2022-11-30
Payer: MEDICARE

## 2022-11-30 PROCEDURE — G0511 CCM/BHI BY RHC/FQHC 20MIN MO: HCPCS | Mod: ,,, | Performed by: INTERNAL MEDICINE

## 2022-11-30 PROCEDURE — G0511 PR CHRONIC CARE MGMT, RHC OR FQHC ONLY, 20 MINS OR MORE: ICD-10-PCS | Mod: ,,, | Performed by: INTERNAL MEDICINE

## 2022-12-01 NOTE — PHYSICIAN QUERY
PT Name: Abdias Carver  MR #: [id number]     DOCUMENTATION CLARIFICATION     CDS/: Dannielle BRANHAMS            Contact information: [email or web address]  12/1 - removed query per leadership instructions Constantine RIDDLE   This form is a permanent document in the medical record.     Query Date: December 1, 2022    By submitting this query, we are merely seeking further clarification of documentation.  Please utilize your independent clinical judgment when addressing the question(s) below.    The Medical Record contains the following   Indicators Supporting Clinical Findings Location in Medical Record   x Heart Failure documented Acute congestive heart failure Discharge summary    x BNP  Latest Reference Range & Units 11/08/22 15:53   NT-proBNP 1 - 450 pg/mL 5,943 (H)    Labs    x EF/Echo Atrial fibrillation observed.  The left ventricle is normal in size with concentric remodeling and low normal systolic function.  The estimated ejection fraction is 50%.  Normal right ventricular size with normal right ventricular systolic function.  Normal left ventricular diastolic function.  The mean diastolic gradient across the mitral valve is 3 mmHg at a heart rate of 77 bpm.  There is mild to moderate mitral stenosis.  Moderate left atrial enlargement.  Moderate right atrial enlargement.  Normal central venous pressure (3 mmHg).  The estimated PA systolic pressure is 22 mmHg. Echo 11/2022   x Radiology findings  chest x-ray suggestive of a presence of pulmonary edema with bilateral pleural effusions along with significantly elevated proBNP level and a mild elevation in troponin doubt any ischemic abnormalities seen on EKG.  HPI - Discharge summary     Subjective/Objective Respiratory Conditions      Recent/Current MI      Heart Transplant, LVAD      Edema, JVD      Ascites     x Diuretics/Meds furosemide injection 40 mg  Dose: 40 mg  Freq: Every 12 hours (non-standard times) Route: IV  Start: 11/08/22 2136  End: 11/10/22 0909 MAR     Other Treatment      Other       Heart failure is a clinical diagnosis which includes symptomatic fluid retention, elevated intracardiac pressures, and/or the inability of the heart to deliver adequate blood flow.    Heart Failure with reduced Ejection Fraction (HFrEF) or Systolic Heart Failure (loses ability to contract normally, EF is <40%)    Heart Failure with preserved Ejection Fraction (HFpEF) or Diastolic Heart Failure (stiff ventricles, does not relax properly, EF is >50%)     Heart Failure with Combined Systolic and Diastolic Failure (stiff ventricles, does not relax properly and EF is <50%)    Mid-range or mildly reduced ejection fraction (HFmrEF) is classified as systolic heart failure.  Congestive heart failure with a recovered EF is classified as Diastolic Heart Failure.  Common clues to acute exacerbation:  Rapidly progressive symptoms (w/in 2 weeks of presentation), using IV diuretics, using supplemental O2, pulmonary edema on Xray, new or worsening pleural effusion, +JVD or other signs of volume overload, MI w/in 4 weeks, and/or BNP >500  The clinical guidelines noted are only system guidelines, and do not replace the providers clinical judgment.    Please clarify the TYPE of heart failure    [   ]  Acute Diastolic Heart Failure (HFpEF) - new diagnosis   [   ]  Other (please specify): ___________________________________   [  ]  Clinically Undetermined           Please document in your progress notes daily for the duration of treatment until resolved and include in your discharge summary.    References:  American Heart Association editorial staff. (2017, May). Ejection Fraction Heart Failure Measurement. American Heart Association. https://www.heart.org/en/health-topics/heart-failure/diagnosing-heart-failure/ejection-fraction-heart-failure-measurement#:~:text=Ejection%20fraction%20(EF)%20is%20a,pushed%20out%20with%20each%20heartbeat  SLIME Bell (2020, December 15).  Heart failure with preserved ejection fraction: Clinical manifestations and diagnosis. [employer]. https://www.Skyhood.com/contents/heart-failure-with-preserved-ejection-fraction-clinical-manifestations-and-diagnosis.  ICD-10-CM/PCS Coding Clinic Third Quarter ICD-10, Effective with discharges: September 8, 2020 [facility] [place] [facility] § Heart failure with mid-range or mildly reduced ejection fraction (2020).  ICD-10-CM/PCS Coding Clinic Third Quarter ICD-10, Effective with discharges: September 8, 2020 [facility] § Heart failure with recovered ejection fraction (2020).  Form No. 95108

## 2022-12-01 NOTE — PHYSICIAN QUERY
PT Name: Abdias Carver  MR #: [id number]    DOCUMENTATION CLARIFICATION     CDS/: Dannielle BRANHAMS           Contact information:  [email or web address]    12/1 - removed query per leadership instructions Constantine BRANHAMS   This form is a permanent document in the medical record.     Query Date: December 1, 2022    By submitting this query, we are merely seeking further clarification of documentation.. Please utilize your independent clinical judgment when addressing the question(s) below.    The medical record contains the following:   Indicators  Supporting Clinical Findings Location in Medical Record   x Energy Intake consuming <50% estimated needs >5 days RD note 11/9    x Weight Loss  Pt family reports that pt weighed ~112 lbs x2 weeks ago, indication ~14 lb weight loss (12% weight loss -severe RD note 11/9    x Fat Loss fat loss present in the orbital, buccal region RD note 11/9    x Muscle Loss  muscle wasting present in the temporal, clavicle, deltoid, interosseous, calf regions; RD note 11/9     Edema/Fluid Accumulation      Reduced  Strength (by dynamometer)     x Weight, BMI, Usual Body Weight Weight Method: Bed Scale  Weight: 44.9 kg (98 lb 15.8 oz)  BMI (Calculated): 17.5 RD note 11/9     Delayed Wound Healing     x Registered Dietician Diagnosis pt meeting criteria for Severe Malnutrition  RD note 11/9    x Acute or Chronic Illness Diagnosis: other (see comments) (persistent a-fib with rapid ventricular response)  Relevant Medical History: HLD, HTN, PVD, a-fib, pulmonary emboli, hpothyroidism, CAD, GERD, vit D deficiency, CVA RD note 11/9     Social or Environmental Circumstances     x Treatment Recommend addition of Ensure Clear nutritional supplement BID to increase kcal/pro intake to promote gradual weight gain towards IBW range. Pt does not like regular Ensure but agreed to try something juice-like. Pt also receptive to trying Gelatein. Recommend addition of Gelatein q1 daily.  If PO intake suboptimal, consider addition of appetite stimulant. Encourage good PO intakes RD note 11/9     Other       Academy of Nutrition and Dietetics (Academy) and the American Society for Parenteral and Enteral Nutrition (A.S.P.E.N.) Clinical Characteristics to support Malnutrition   Malnutrition in the Context of Acute Illness or Injury Malnutrition in the Context of Chronic Illness or Injury Malnutrition in the Context of Social or Environmental Circumstances   Malnutrition Level Moderate Severe Moderate Severe   Moderate   Severe   Energy Intake <75%                   >7 days <50%                 >5 days <75%           >1 month <75%                      >1 month   <75% for >3 months   <50% for >1 month   Weight Loss   1-2% in 1 week >2% in 1 week 5% in 1 month >5% in 1 month 5% in 1 month >5% in 1 month    5% in 1 month >5% in 1 month 7.5% in 3 months >7.5% in 3 months 7.5% in 3 months >7.5% in 3 months    7.5% in 3 months >7.5% in 3 months 10% in 6 months >10% in 6 months 10% in 6 months >10% in 6 months        20% in 1 year                    >20% in 1 year                                                                  20% in 1 year                            >20% in 1 year                                                  Subcutaneous Fat Loss Mild  Moderate  Mild  Severe    Mild   Severe   Muscle Loss Mild  Moderate  Mild  Severe    Mild   Severe   Edema/Fluid Accumulation Mild Moderate to severe  Mild  Severe   Mild   Severe   Reduced  Strength         (based on standards supplied by  of dynamometer) N/A Measurably reduced N/A Measurably reduced N/A Measurably reduced     Criteria for mild malnutrition is defined as 1 characteristic outlined above within the established moderate or severe parameters.  A minimum of 2 out of the 6 characteristics noted above are recommended for a diagnosis of moderate or severe malnutrition.  Chronic illness/injury is a disease/condition lasting 3  months or longer.    The noted clinical guidelines are only system guidelines and do not replace the providers clinical judgment.    Provider, please specify diagnosis or diagnoses associated with above clinical findings.    [  ] Severe Malnutrition - a minimum of 2 of the 6 severe malnutrition characteristics noted above    [  ] Malnutrition, Unspecified degree   [  ] Other Nutritional Diagnosis (please specify): _______   [  ] Clinically Undetermined         Please document in your progress notes daily for the duration of treatment until resolved and  include in your discharge summary.      References:    SHEELA Mahmood, & BRENDA Mcnamara (2022, April). Assessment and management of anorexia and cachexia in palliative care. Retrieved May 23, 2022, from https://www.[employer]rumDNA/contents/assessment-and-management-of-anorexia-and-cachexia-in-palliative-care?ghbmkVav=3593&source=see_link     GENO Carrillo, PhD, RD, Jett LANGSTON P., PhD, RN, MELISSA Cintron MD, PhD, Domingo KAY A., MS, RD, [facility], LONI Poole, MS, RD, The Academy Malnutrition Work Group, The A.S.P.E.N. Board of Directors. (2012). Consensus Statement: Academy of Nutrition and Dietetics and American Society for Parenteral and Enteral Nutrition: Characteristics Recommended for the Identification and Documentation of Adult Malnutrition (Undernutrition). Journal of Parenteral and Enteral Nutrition, 36(3), 275-283. doi:10.1177/1818835470367935     Form No. 86116

## 2022-12-08 ENCOUNTER — HOSPITAL ENCOUNTER (OUTPATIENT)
Dept: RADIOLOGY | Facility: HOSPITAL | Age: 82
Discharge: HOME | End: 2022-12-08
Attending: FAMILY MEDICINE
Payer: MEDICARE

## 2022-12-08 DIAGNOSIS — R41.82 ABNORMAL MENTAL STATE: ICD-10-CM

## 2022-12-08 PROCEDURE — 70450 CT HEAD/BRAIN W/O DYE: CPT | Mod: TC

## 2022-12-13 ENCOUNTER — OUTSIDE PLACE OF SERVICE (OUTPATIENT)
Dept: FAMILY MEDICINE | Facility: CLINIC | Age: 82
End: 2022-12-13
Payer: MEDICARE

## 2022-12-13 PROCEDURE — 99309 PR NURSING FAC CARE, SUBSEQ, SIGNIF COMPLIC: ICD-10-PCS | Mod: ,,, | Performed by: FAMILY MEDICINE

## 2022-12-13 PROCEDURE — 99309 SBSQ NF CARE MODERATE MDM 30: CPT | Mod: ,,, | Performed by: FAMILY MEDICINE

## 2022-12-16 ENCOUNTER — OFFICE VISIT (OUTPATIENT)
Dept: CARDIOLOGY | Facility: CLINIC | Age: 82
End: 2022-12-16
Payer: MEDICARE

## 2022-12-16 VITALS
WEIGHT: 100.81 LBS | RESPIRATION RATE: 18 BRPM | BODY MASS INDEX: 17.86 KG/M2 | SYSTOLIC BLOOD PRESSURE: 106 MMHG | HEIGHT: 63 IN | DIASTOLIC BLOOD PRESSURE: 66 MMHG | HEART RATE: 87 BPM

## 2022-12-16 DIAGNOSIS — I48.11 LONGSTANDING PERSISTENT ATRIAL FIBRILLATION: Primary | Chronic | ICD-10-CM

## 2022-12-16 DIAGNOSIS — R06.6 HICCUPS: ICD-10-CM

## 2022-12-16 DIAGNOSIS — E78.5 HYPERLIPIDEMIA, UNSPECIFIED HYPERLIPIDEMIA TYPE: Chronic | ICD-10-CM

## 2022-12-16 DIAGNOSIS — I10 PRIMARY HYPERTENSION: Chronic | ICD-10-CM

## 2022-12-16 PROCEDURE — 99214 PR OFFICE/OUTPT VISIT, EST, LEVL IV, 30-39 MIN: ICD-10-PCS | Mod: S$PBB,,, | Performed by: INTERNAL MEDICINE

## 2022-12-16 PROCEDURE — 99214 OFFICE O/P EST MOD 30 MIN: CPT | Mod: S$PBB,,, | Performed by: INTERNAL MEDICINE

## 2022-12-16 PROCEDURE — 93010 ELECTROCARDIOGRAM REPORT: CPT | Mod: S$PBB,,, | Performed by: INTERNAL MEDICINE

## 2022-12-16 PROCEDURE — 93005 ELECTROCARDIOGRAM TRACING: CPT | Mod: PBBFAC | Performed by: INTERNAL MEDICINE

## 2022-12-16 PROCEDURE — 93010 EKG 12-LEAD: ICD-10-PCS | Mod: S$PBB,,, | Performed by: INTERNAL MEDICINE

## 2022-12-16 PROCEDURE — 99214 OFFICE O/P EST MOD 30 MIN: CPT | Mod: PBBFAC | Performed by: INTERNAL MEDICINE

## 2022-12-19 PROBLEM — R06.6 HICCUPS: Status: ACTIVE | Noted: 2022-12-19

## 2022-12-19 NOTE — PROGRESS NOTES
[facility] note        DATE OF SERVICE: 12/19/2022       PCP: Mandeep Branham MD      CHIEF COMPLAINT:   Chief Complaint   Patient presents with    Follow-up     6 Month    Chest Pain     At times    Shortness of Breath     When moving around    Palpitations    Dizziness        HISTORY OF PRESENT ILLNESS:  Abdias Carver is a 82 y.o. female with a PMH of   Past Medical History:   Diagnosis Date    Atrial fibrillation     Coronary artery disease     CVA (cerebral vascular accident)     2021    GERD (gastroesophageal reflux disease)     Hyperlipidemia     Hypertension     Hypothyroidism     Pulmonary emboli     PVD (peripheral vascular disease)     Vitamin D deficiency      who presents for   Chief Complaint   Patient presents with    Follow-up     6 Month    Chest Pain     At times    Shortness of Breath     When moving around    Palpitations    Dizziness          Review of Systems: Review of Systems   Respiratory:  Negative for shortness of breath.    Cardiovascular:  Positive for palpitations. Negative for chest pain and leg swelling.   Neurological:  Positive for dizziness. Negative for loss of consciousness.      PAST MEDICAL HISTORY:  Past Medical History:   Diagnosis Date    Atrial fibrillation     Coronary artery disease     CVA (cerebral vascular accident)     2021    GERD (gastroesophageal reflux disease)     Hyperlipidemia     Hypertension     Hypothyroidism     Pulmonary emboli     PVD (peripheral vascular disease)     Vitamin D deficiency        PAST SURGICAL HISTORY:  Past Surgical History:   Procedure Laterality Date    APPENDECTOMY      CARDIAC CATHETERIZATION Left 04/2013    CATARACT EXTRACTION         SOCIAL HISTORY:  Social History     Socioeconomic History    Marital status:    Tobacco Use    Smoking status: Never    Smokeless tobacco: Never   Substance and Sexual Activity    Alcohol use: Never    Drug use: Never    Sexual activity: Not Currently     Social Determinants of  Health     Financial Resource Strain: Low Risk     Difficulty of Paying Living Expenses: Not hard at all   Food Insecurity: No Food Insecurity    Worried About Running Out of Food in the Last Year: Never true    Ran Out of Food in the Last Year: Never true   Transportation Needs: No Transportation Needs    Lack of Transportation (Medical): No    Lack of Transportation (Non-Medical): No   Physical Activity: Inactive    Days of Exercise per Week: 0 days    Minutes of Exercise per Session: 0 min   Stress: No Stress Concern Present    Feeling of Stress : Not at all   Social Connections: Socially Isolated    Frequency of Communication with Friends and Family: More than three times a week    Frequency of Social Gatherings with Friends and Family: More than three times a week    Attends Nondenominational Services: Never    Active Member of Clubs or Organizations: No    Attends Club or Organization Meetings: Never    Marital Status:    Housing Stability: Low Risk     Unable to Pay for Housing in the Last Year: No    Number of Places Lived in the Last Year: 1    Unstable Housing in the Last Year: No       FAMILY HISTORY:  Family History   Problem Relation Age of Onset    Diabetes Mother     Hypertension Mother     Heart disease Mother     Cancer Mother     Heart disease Father     Hypertension Father     Parkinsonism Sister     Alcohol abuse Daughter          ALLERGIES:  Review of patient's allergies indicates:  No Known Allergies     MEDICATIONS:    Current Outpatient Medications:     apixaban (ELIQUIS) 2.5 mg Tab, Take 1 tablet (2.5 mg total) by mouth 2 (two) times daily., Disp: 60 tablet, Rfl: 5    aspirin 81 MG Chew, Take 1 tablet (81 mg total) by mouth once daily., Disp: 30 tablet, Rfl: 0    atorvastatin (LIPITOR) 40 MG tablet, Take 1 tablet (40 mg total) by mouth every evening., Disp: 30 tablet, Rfl: 5    cephALEXin (KEFLEX) 500 MG capsule, Take 1 capsule (500 mg total) by mouth every 6 (six) hours., Disp: 28 capsule,  "Rfl: 0    diltiaZEM (CARDIZEM) 60 MG tablet, Take 1 tablet (60 mg total) by mouth every 6 (six) hours., Disp: 120 tablet, Rfl: 11    levothyroxine (SYNTHROID) 75 MCG tablet, Take 1 tablet (75 mcg total) by mouth once daily., Disp: 30 tablet, Rfl: 5    nitroGLYCERIN (NITROSTAT) 0.4 MG SL tablet, Place 1 tablet (0.4 mg total) under the tongue every 5 (five) minutes as needed for Chest pain. Put 1 tablet under your tongue as needed for chest pain; may take up to 3 doses 5 minutes apart. If no resolutiion of CP go to the nearest ED for evaluation., Disp: 25 tablet, Rfl: 3    omeprazole (PRILOSEC OTC) 20 MG tablet, Take 1 tablet (20 mg total) by mouth once daily., Disp: 30 tablet, Rfl: 5   Medication list from nursing home.     PHYSICAL EXAM:  /66 (BP Location: Left arm, Patient Position: Sitting)   Pulse 87   Resp 18   Ht 5' 3" (1.6 m)   Wt 45.7 kg (100 lb 12.8 oz)   BMI 17.86 kg/m²   Wt Readings from Last 3 Encounters:   12/16/22 45.7 kg (100 lb 12.8 oz)   11/10/22 44.1 kg (97 lb 3.6 oz)   10/03/22 52.2 kg (115 lb)      Body mass index is 17.86 kg/m².    Physical Exam  Vitals reviewed.   Constitutional:       Appearance: She is ill-appearing.   HENT:      Head: Normocephalic and atraumatic.   Neck:      Vascular: No carotid bruit or JVD.   Cardiovascular:      Rate and Rhythm: Normal rate. Rhythm irregularly irregular.      Pulses:           Radial pulses are 2+ on the right side and 2+ on the left side.        Dorsalis pedis pulses are 1+ on the right side and 1+ on the left side.      Heart sounds: Normal heart sounds.   Pulmonary:      Effort: Pulmonary effort is normal.      Breath sounds: Normal breath sounds.   Musculoskeletal:      Right lower leg: No edema.      Left lower leg: No edema.   Skin:     General: Skin is warm and dry.   Neurological:      Mental Status: She is alert and oriented to person, place, and time.       LABS REVIEWED:  Lab Results   Component Value Date    WBC 3.93 (L) 12/09/2022 "    RBC 4.19 (L) 2022    HGB 12.4 2022    HCT 37.9 (L) 2022    MCV 90.5 2022    MCH 29.6 2022    MCHC 32.7 2022    RDW 14.1 2022     2022    MPV 9.4 2022    NRBC 0.0 2022    INR 1.31 (H) 2021     Lab Results   Component Value Date     2022    K 4.0 2022     2022    CO2 32 2022    BUN 35 (H) 2022    MG 2.0 10/03/2022     Lab Results   Component Value Date    CPK 53 2022    AST 16 2022    ALT 19 2022     Lab Results   Component Value Date    GLU 77 2022     Lab Results   Component Value Date    CHOL 105 2022    HDL 45 2022    TRIG 60 2022    CHOLHDL 2.3 2022   LDL 48    CARDIAC STUDIES REVIEWED:    EK/16/22--a-fib, low voltage QRS, 87 bpm  22--a-fib, low voltage QRS, 64 bpm    Echo    22--Interpretation Summary  · Atrial fibrillation observed.  · The left ventricle is normal in size with concentric remodeling and low normal systolic function.  · The estimated ejection fraction is 50%.  · Normal right ventricular size with normal right ventricular systolic function.  · Normal left ventricular diastolic function.  · The mean diastolic gradient across the mitral valve is 3 mmHg at a heart rate of 77 bpm.  · There is mild to moderate mitral stenosis.  · Moderate left atrial enlargement.  · Moderate right atrial enlargement.  · Normal central venous pressure (3 mmHg).  · The estimated PA systolic pressure is 22 mmHg.    Lexiscan:    21--Large lateral wall fixed perfusion defect near the base consistent with scar but with no evidence of ischemia. Small, fixed apical perfusion defect consistent with artifact versus scar but no evidence of ischemia. Normal left ventricular size with hyperdynamic left ventricular systolic function, ejection fraction 78%        ASSESSMENT:  Patient reports hiccups but no chest pain to me.  Denies SOB.     Active  Problem List with Overview Notes    Diagnosis Date Noted    Hiccups 12/19/2022    UTI (urinary tract infection) 11/11/2022    Shortness of breath 11/08/2022    Longstanding persistent atrial fibrillation 11/08/2022    Palpitations 06/08/2022    Gastroesophageal reflux disease 05/11/2022    History of CVA (cerebrovascular accident) 10/18/2021    Other chest pain 09/07/2021     atypical for angina with no ischemia on Lexiscan      Cerebrovascular accident (CVA) 08/13/2021    Abnormal gait 08/13/2021    Fall 08/13/2021    Dementia without behavioral disturbance 08/13/2021    Coronary artery disease involving native coronary artery of native heart 06/10/2021     Mild, nonobstructive CAD by [facility] 4/2/2013      Atrial fibrillation      Paroxysmal afib; on Eliquis for cva prophylaxis      Hyperlipidemia     Hypertension      VISIT DIAGNOSIS:  Longstanding persistent atrial fibrillation  Comments:  on Eliquis  Orders:  -     EKG 12-lead; Future    Primary hypertension    Hyperlipidemia, unspecified hyperlipidemia type    Hiccups      Under hydration       PLAN:   Increase PO water intake, consider using [place] for flavor.    Orders Placed This Encounter   Procedures    EKG 12-lead     Standing Status:   Future     Number of Occurrences:   1     Standing Expiration Date:   12/16/2023      RTC 6 months.

## 2022-12-31 ENCOUNTER — EXTERNAL CHRONIC CARE MANAGEMENT (OUTPATIENT)
Dept: FAMILY MEDICINE | Facility: CLINIC | Age: 82
End: 2022-12-31
Payer: MEDICARE

## 2022-12-31 PROCEDURE — G0511 CCM/BHI BY RHC/FQHC 20MIN MO: HCPCS | Mod: ,,, | Performed by: INTERNAL MEDICINE

## 2022-12-31 PROCEDURE — G0511 PR CHRONIC CARE MGMT, RHC OR FQHC ONLY, 20 MINS OR MORE: ICD-10-PCS | Mod: ,,, | Performed by: INTERNAL MEDICINE

## 2023-01-10 ENCOUNTER — OUTSIDE PLACE OF SERVICE (OUTPATIENT)
Dept: FAMILY MEDICINE | Facility: CLINIC | Age: 83
End: 2023-01-10
Payer: MEDICARE

## 2023-01-10 ENCOUNTER — CLINICAL SUPPORT (OUTPATIENT)
Dept: RESPIRATORY THERAPY | Facility: HOSPITAL | Age: 83
End: 2023-01-10
Attending: FAMILY MEDICINE
Payer: MEDICARE

## 2023-01-10 DIAGNOSIS — I48.11 LONGSTANDING PERSISTENT ATRIAL FIBRILLATION: Primary | ICD-10-CM

## 2023-01-10 DIAGNOSIS — I48.11 LONGSTANDING PERSISTENT ATRIAL FIBRILLATION: ICD-10-CM

## 2023-01-10 PROCEDURE — 99309 SBSQ NF CARE MODERATE MDM 30: CPT | Mod: ,,, | Performed by: FAMILY MEDICINE

## 2023-01-10 PROCEDURE — 93010 ELECTROCARDIOGRAM REPORT: CPT | Mod: ,,, | Performed by: INTERNAL MEDICINE

## 2023-01-10 PROCEDURE — 99309 PR NURSING FAC CARE, SUBSEQ, SIGNIF COMPLIC: ICD-10-PCS | Mod: ,,, | Performed by: FAMILY MEDICINE

## 2023-01-10 PROCEDURE — 93005 ELECTROCARDIOGRAM TRACING: CPT

## 2023-01-10 PROCEDURE — 93010 EKG 12-LEAD: ICD-10-PCS | Mod: ,,, | Performed by: INTERNAL MEDICINE

## 2023-01-31 ENCOUNTER — EXTERNAL CHRONIC CARE MANAGEMENT (OUTPATIENT)
Dept: FAMILY MEDICINE | Facility: CLINIC | Age: 83
End: 2023-01-31
Payer: MEDICARE

## 2023-01-31 PROCEDURE — G0511 CCM/BHI BY RHC/FQHC 20MIN MO: HCPCS | Mod: ,,, | Performed by: INTERNAL MEDICINE

## 2023-01-31 PROCEDURE — G0511 PR CHRONIC CARE MGMT, RHC OR FQHC ONLY, 20 MINS OR MORE: ICD-10-PCS | Mod: ,,, | Performed by: INTERNAL MEDICINE

## 2023-02-09 ENCOUNTER — HOSPITAL ENCOUNTER (EMERGENCY)
Facility: HOSPITAL | Age: 83
Discharge: HOME | End: 2023-02-10
Attending: EMERGENCY MEDICINE
Payer: MEDICARE

## 2023-02-09 DIAGNOSIS — I49.9 ARRHYTHMIA: ICD-10-CM

## 2023-02-09 DIAGNOSIS — I50.20 SYSTOLIC CONGESTIVE HEART FAILURE, UNSPECIFIED HF CHRONICITY: Primary | ICD-10-CM

## 2023-02-09 DIAGNOSIS — R07.89 CHEST DISCOMFORT: ICD-10-CM

## 2023-02-09 LAB
ALBUMIN SERPL BCP-MCNC: 3.3 G/DL (ref 3.5–5)
ALBUMIN/GLOB SERPL: 1 {RATIO}
ALP SERPL-CCNC: 94 U/L (ref 55–142)
ALT SERPL W P-5'-P-CCNC: 56 U/L (ref 13–56)
ANION GAP SERPL CALCULATED.3IONS-SCNC: 12 MMOL/L (ref 7–16)
AST SERPL W P-5'-P-CCNC: 38 U/L (ref 15–37)
BASOPHILS # BLD AUTO: 0.03 K/UL (ref 0–0.2)
BASOPHILS NFR BLD AUTO: 0.6 % (ref 0–1)
BILIRUB SERPL-MCNC: 0.4 MG/DL (ref ?–1.2)
BUN SERPL-MCNC: 28 MG/DL (ref 7–18)
BUN/CREAT SERPL: 25 (ref 6–20)
CALCIUM SERPL-MCNC: 8.7 MG/DL (ref 8.5–10.1)
CHLORIDE SERPL-SCNC: 103 MMOL/L (ref 98–107)
CO2 SERPL-SCNC: 29 MMOL/L (ref 21–32)
CREAT SERPL-MCNC: 1.1 MG/DL (ref 0.55–1.02)
DIFFERENTIAL METHOD BLD: ABNORMAL
EGFR (NO RACE VARIABLE) (RUSH/TITUS): 50 ML/MIN/1.73M²
EOSINOPHIL # BLD AUTO: 0.06 K/UL (ref 0–0.5)
EOSINOPHIL NFR BLD AUTO: 1.2 % (ref 1–4)
ERYTHROCYTE [DISTWIDTH] IN BLOOD BY AUTOMATED COUNT: 16.1 % (ref 11.5–14.5)
GLOBULIN SER-MCNC: 3.2 G/DL (ref 2–4)
GLUCOSE SERPL-MCNC: 116 MG/DL (ref 74–106)
HCT VFR BLD AUTO: 39.3 % (ref 38–47)
HGB BLD-MCNC: 13.1 G/DL (ref 12–16)
IMM GRANULOCYTES # BLD AUTO: 0.02 K/UL (ref 0–0.04)
IMM GRANULOCYTES NFR BLD: 0.4 % (ref 0–0.4)
LYMPHOCYTES # BLD AUTO: 1.54 K/UL (ref 1–4.8)
LYMPHOCYTES NFR BLD AUTO: 32 % (ref 27–41)
MCH RBC QN AUTO: 30 PG (ref 27–31)
MCHC RBC AUTO-ENTMCNC: 33.3 G/DL (ref 32–36)
MCV RBC AUTO: 89.9 FL (ref 80–96)
MONOCYTES # BLD AUTO: 0.57 K/UL (ref 0–0.8)
MONOCYTES NFR BLD AUTO: 11.9 % (ref 2–6)
MPC BLD CALC-MCNC: 8.8 FL (ref 9.4–12.4)
NEUTROPHILS # BLD AUTO: 2.59 K/UL (ref 1.8–7.7)
NEUTROPHILS NFR BLD AUTO: 53.9 % (ref 53–65)
NRBC # BLD AUTO: 0 X10E3/UL
NRBC, AUTO (.00): 0 %
NT-PROBNP SERPL-MCNC: 1641 PG/ML (ref 1–450)
PLATELET # BLD AUTO: 227 K/UL (ref 150–400)
POTASSIUM SERPL-SCNC: 4.3 MMOL/L (ref 3.5–5.1)
PROT SERPL-MCNC: 6.5 G/DL (ref 6.4–8.2)
RBC # BLD AUTO: 4.37 M/UL (ref 4.2–5.4)
SODIUM SERPL-SCNC: 140 MMOL/L (ref 136–145)
TROPONIN I SERPL HS-MCNC: 108.9 PG/ML
TROPONIN I SERPL HS-MCNC: 109.1 PG/ML
WBC # BLD AUTO: 4.81 K/UL (ref 4.5–11)

## 2023-02-09 PROCEDURE — 80053 COMPREHEN METABOLIC PANEL: CPT | Performed by: EMERGENCY MEDICINE

## 2023-02-09 PROCEDURE — 99284 EMERGENCY DEPT VISIT MOD MDM: CPT | Mod: ,,, | Performed by: EMERGENCY MEDICINE

## 2023-02-09 PROCEDURE — 85025 COMPLETE CBC W/AUTO DIFF WBC: CPT | Performed by: EMERGENCY MEDICINE

## 2023-02-09 PROCEDURE — 99284 PR EMERGENCY DEPT VISIT,LEVEL IV: ICD-10-PCS | Mod: ,,, | Performed by: EMERGENCY MEDICINE

## 2023-02-09 PROCEDURE — 83880 ASSAY OF NATRIURETIC PEPTIDE: CPT | Performed by: EMERGENCY MEDICINE

## 2023-02-09 PROCEDURE — 99285 EMERGENCY DEPT VISIT HI MDM: CPT | Mod: 25

## 2023-02-09 PROCEDURE — 93010 EKG 12-LEAD: ICD-10-PCS | Mod: ,,, | Performed by: HOSPITALIST

## 2023-02-09 PROCEDURE — 93005 ELECTROCARDIOGRAM TRACING: CPT

## 2023-02-09 PROCEDURE — 93010 ELECTROCARDIOGRAM REPORT: CPT | Mod: ,,, | Performed by: HOSPITALIST

## 2023-02-09 PROCEDURE — 84484 ASSAY OF TROPONIN QUANT: CPT | Mod: 91 | Performed by: EMERGENCY MEDICINE

## 2023-02-09 RX ORDER — ASPIRIN 325 MG
325 TABLET ORAL
Status: DISCONTINUED | OUTPATIENT
Start: 2023-02-09 | End: 2023-02-09 | Stop reason: CLARIF

## 2023-02-10 ENCOUNTER — TELEPHONE (OUTPATIENT)
Dept: EMERGENCY MEDICINE | Facility: HOSPITAL | Age: 83
End: 2023-02-10
Payer: MEDICARE

## 2023-02-10 VITALS
DIASTOLIC BLOOD PRESSURE: 89 MMHG | HEIGHT: 63 IN | HEART RATE: 90 BPM | BODY MASS INDEX: 17.72 KG/M2 | TEMPERATURE: 98 F | OXYGEN SATURATION: 97 % | SYSTOLIC BLOOD PRESSURE: 137 MMHG | RESPIRATION RATE: 19 BRPM | WEIGHT: 100 LBS

## 2023-02-10 RX ORDER — FUROSEMIDE 20 MG/1
10 TABLET ORAL DAILY
Qty: 30 TABLET | Refills: 0 | Status: SHIPPED | OUTPATIENT
Start: 2023-02-10 | End: 2024-02-20

## 2023-02-10 NOTE — ED PROVIDER NOTES
Encounter Date: 2/9/2023    SCRIBE #1 NOTE: I, Jayne Reardon, am scribing for, and in the presence of,  Sam Garcia MD. I have scribed the entire note.     History     Chief Complaint   Patient presents with    Chest Pain     This is an 81 y/o white female,who presents to the ED via EMS with [facility]. Per EMS, pt told nursing home staff she was having chest tightness. She has a known hx of severe Dementia. She received Aspirin and Nitro prior to arrival.  She now has no CP at this time. There is no Hx of SOB, cough or fever voiced while in the ED at this time.     The history is provided by the EMS personnel and the nursing home. The history is limited by the condition of the patient. No  was used.   Review of patient's allergies indicates:  No Known Allergies  Past Medical History:   Diagnosis Date    Atrial fibrillation     Coronary artery disease     CVA (cerebral vascular accident)     2021    GERD (gastroesophageal reflux disease)     Hyperlipidemia     Hypertension     Hypothyroidism     Pulmonary emboli     PVD (peripheral vascular disease)     Vitamin D deficiency      Past Surgical History:   Procedure Laterality Date    APPENDECTOMY      CARDIAC CATHETERIZATION Left 04/2013    CATARACT EXTRACTION       Family History   Problem Relation Age of Onset    Diabetes Mother     Hypertension Mother     Heart disease Mother     Cancer Mother     Heart disease Father     Hypertension Father     Parkinsonism Sister     Alcohol abuse Daughter      Social History     Tobacco Use    Smoking status: Never    Smokeless tobacco: Never   Substance Use Topics    Alcohol use: Never    Drug use: Never     Review of Systems   Unable to perform ROS: Dementia     Physical Exam     Initial Vitals [02/09/23 1921]   BP Pulse Resp Temp SpO2   (!) 137/91 89 20 97.7 °F (36.5 °C) 96 %      MAP       --         Physical Exam    Nursing note and vitals reviewed.  Constitutional: She  appears well-developed and well-nourished.   HENT:   Head: Normocephalic and atraumatic.   Eyes: EOM are normal. Pupils are equal, round, and reactive to light.   Neck: Neck supple. No thyromegaly present.   Normal range of motion.  Cardiovascular:  Normal heart sounds and intact distal pulses.           No murmur heard.  Irregularly regularly.    Pulmonary/Chest: Breath sounds normal. She has no wheezes.   Abdominal: Abdomen is soft. Bowel sounds are normal. There is no abdominal tenderness.   Musculoskeletal:         General: No tenderness or edema. Normal range of motion.      Cervical back: Normal range of motion and neck supple.     Lymphadenopathy:     She has no cervical adenopathy.   Neurological: She is alert and oriented to person, place, and time. She has normal strength and normal reflexes. No cranial nerve deficit or sensory deficit. GCS score is 15. GCS eye subscore is 4. GCS verbal subscore is 5. GCS motor subscore is 6.   Skin: Skin is warm and dry. Capillary refill takes less than 2 seconds. No rash noted.   Psychiatric: She has a normal mood and affect.       ED Course   Procedures  Labs Reviewed   COMPREHENSIVE METABOLIC PANEL - Abnormal; Notable for the following components:       Result Value    Glucose 116 (*)     BUN 28 (*)     Creatinine 1.10 (*)     BUN/Creatinine Ratio 25 (*)     Albumin 3.3 (*)     AST 38 (*)     eGFR 50 (*)     All other components within normal limits   TROPONIN I - Abnormal; Notable for the following components:    Troponin I High Sensitivity 108.9 (*)     All other components within normal limits   NT-PRO NATRIURETIC PEPTIDE - Abnormal; Notable for the following components:    ProBNP 1,641 (*)     All other components within normal limits   CBC WITH DIFFERENTIAL - Abnormal; Notable for the following components:    RDW 16.1 (*)     MPV 8.8 (*)     Monocytes % 11.9 (*)     All other components within normal limits   TROPONIN I - Abnormal; Notable for the following  components:    Troponin I High Sensitivity 109.1 (*)     All other components within normal limits   CBC W/ AUTO DIFFERENTIAL    Narrative:     The following orders were created for panel order CBC auto differential.  Procedure                               Abnormality         Status                     ---------                               -----------         ------                     CBC with Differential[[id number]]        Abnormal            Final result                 Please view results for these tests on the individual orders.   EXTRA TUBES    Narrative:     The following orders were created for panel order EXTRA TUBES.  Procedure                               Abnormality         Status                     ---------                               -----------         ------                     Light Blue Top Hold[[id number]]                              In process                   Please view results for these tests on the individual orders.   LIGHT BLUE TOP HOLD        ECG Results              EKG 12-lead (Final result)  Result time 02/10/23 06:24:04      Final result by Interface, Lab In [facility] (02/10/23 06:24:04)                   Narrative:    Test Reason : I49.9,    Vent. Rate : 096 BPM     Atrial Rate : 000 BPM     P-R Int : 000 ms          QRS Dur : 070 ms      QT Int : 368 ms       P-R-T Axes : 000 001 054 degrees     QTc Int : 431 ms    Atrial fibrillation  Anteroseptal infarct - age undetermined  Low QRS voltages in precordial leads  Abnormal ECG    Confirmed by Felipe LOZOYA, Peggy JACKSON ([id number]) on 2/10/2023 6:23:56 AM    Referred By: [email or web address]   SELF           Confirmed By:Peggy Wang MD                                  Imaging Results              X-Ray Chest 1 View (Final result)  Result time 02/09/23 20:02:06      Final result by Cesar Box DO (02/09/23 20:02:06)                   Impression:      As above.    Point of Service: [facility] [place] [facility]      Electronically signed  by: Cesar Charu  Date:    02/09/2023  Time:    20:02               Narrative:    EXAMINATION:  XR CHEST 1 VIEW    CLINICAL HISTORY:  Other chest pain    COMPARISON:  Chest x-ray November 8, 2022    TECHNIQUE:  Frontal view/views of the chest.    FINDINGS:  Mild cardiomegaly.  Question subtle patchy ground-glass and interstitial infiltrate/edema within the right greater than left lung.  Suspect trace bilateral pleural fluid.  Visualized osseous and surrounding soft tissue structures appear grossly unchanged.                                    X-Rays:   Independently Interpreted Readings:   Chest X-Ray: Xray Chest 1 view:   Mild cardiomegaly.  Question subtle patchy ground-glass and interstitial infiltrate/edema within the right greater than left lung.  Suspect trace bilateral pleural fluid.  Visualized osseous and surrounding soft tissue structures appear grossly unchanged.   Medications - No data to display  Medical Decision Making:   Initial Assessment:   82-year-old female patient came to the emergency department with chest discomfort.  Physical examination is unremarkable.  She has history of elevated troponin as baseline  Differential Diagnosis:   ACS  CHF  Pneumonia  Clinical Tests:   Lab Tests: Ordered and Reviewed       <> Summary of Lab: Labs Reviewed  COMPREHENSIVE METABOLIC PANEL - Abnormal; Notable for the following components:      Result Value   Glucose 116 (*)    BUN 28 (*)    Creatinine 1.10 (*)    BUN/Creatinine Ratio 25 (*)    Albumin 3.3 (*)    AST 38 (*)    eGFR 50 (*)    All other components within normal limits  TROPONIN I - Abnormal; Notable for the following components:   Troponin I High Sensitivity 108.9 (*)    All other components within normal limits  NT-PRO NATRIURETIC PEPTIDE - Abnormal; Notable for the following components:   ProBNP 1,641 (*)    All other components within normal limits  CBC WITH DIFFERENTIAL - Abnormal; Notable for the following components:   RDW 16.1 (*)    MPV 8.8  (*)    Monocytes % 11.9 (*)    All other components within normal limits  TROPONIN I - Abnormal; Notable for the following components:   Troponin I High Sensitivity 108 that is followed up by 3 hours repeat of109  without any EKG changes   All other components within normal limits      Radiological Study: Ordered and Reviewed  Medical Tests: Ordered and Reviewed  ED Management:  82-year-old female patient with history of chronic elevation of troponin came to the emergency department with chest tightness.  Her troponin is chronicly elevated..  The patient workup in the emergency department showed stable troponin.  ProBNP is elevated with chest x-ray showed some congestion.  I feel the patient is in early congestive heart failure.  We will start some low-dose Lasix and discharge the patient.            Attending Attestation:           Physician Attestation for Scribe:  Physician Attestation Statement for Scribe #1: I, Sandy Garcia MD, reviewed documentation, as scribed by Jayne Reardon in my presence, and it is both accurate and complete.           ED Course as of 02/11/23 1948 Thu Feb 09, 2023 2009 Xray Chest 1 view:   Mild cardiomegaly.  Question subtle patchy ground-glass and interstitial infiltrate/edema within the right greater than left lung.  Suspect trace bilateral pleural fluid.  Visualized osseous and surrounding soft tissue structures appear grossly unchanged. [BW]      ED Course User Index  [BW] Jayne Reardon                 Clinical Impression:   Final diagnoses:  [I49.9] Arrhythmia  [R07.89] Chest discomfort  [I50.20] Systolic congestive heart failure, unspecified HF chronicity (Primary)        ED Disposition Condition    Discharge Stable          ED Prescriptions       Medication Sig Dispense Start Date End Date Auth. Provider    furosemide (LASIX) 20 MG tablet Take 0.5 tablets (10 mg total) by mouth once daily. 30 tablet 2/10/2023 2/10/2024 Sam Garcia MD          Follow-up  Information    None          Sam Garcia MD  02/11/23 1948

## 2023-02-13 PROBLEM — N39.0 UTI (URINARY TRACT INFECTION): Status: RESOLVED | Noted: 2022-11-11 | Resolved: 2023-02-13

## 2023-02-13 PROBLEM — I63.9 CEREBROVASCULAR ACCIDENT (CVA): Status: RESOLVED | Noted: 2021-08-13 | Resolved: 2023-02-13

## 2023-02-28 ENCOUNTER — EXTERNAL CHRONIC CARE MANAGEMENT (OUTPATIENT)
Dept: FAMILY MEDICINE | Facility: CLINIC | Age: 83
End: 2023-02-28
Payer: MEDICARE

## 2023-02-28 PROCEDURE — G0511 CCM/BHI BY RHC/FQHC 20MIN MO: HCPCS | Mod: ,,, | Performed by: INTERNAL MEDICINE

## 2023-02-28 PROCEDURE — G0511 PR CHRONIC CARE MGMT, RHC OR FQHC ONLY, 20 MINS OR MORE: ICD-10-PCS | Mod: ,,, | Performed by: INTERNAL MEDICINE

## 2023-03-14 ENCOUNTER — TELEPHONE (OUTPATIENT)
Dept: ADMINISTRATIVE | Facility: HOSPITAL | Age: 83
End: 2023-03-14

## 2023-03-14 NOTE — TELEPHONE ENCOUNTER
Attempted to contact pt to schedule Annual Visit. No answer, LVM for pt to contact the clinic to schedule. [phone]    PT NEEDS ANNUAL VISIT

## 2023-03-29 ENCOUNTER — LAB REQUISITION (OUTPATIENT)
Dept: LAB | Facility: HOSPITAL | Age: 83
End: 2023-03-29
Attending: FAMILY MEDICINE
Payer: MEDICARE

## 2023-03-29 DIAGNOSIS — N18.30 CHRONIC KIDNEY DISEASE, STAGE 3 UNSPECIFIED: ICD-10-CM

## 2023-03-29 DIAGNOSIS — E83.51 HYPOCALCEMIA: ICD-10-CM

## 2023-03-29 DIAGNOSIS — E03.9 HYPOTHYROIDISM, UNSPECIFIED: ICD-10-CM

## 2023-03-29 LAB
25(OH)D3 SERPL-MCNC: 43.9 NG/ML
ALBUMIN SERPL BCP-MCNC: 3.2 G/DL (ref 3.5–5)
ALBUMIN/GLOB SERPL: 1.2 {RATIO}
ALP SERPL-CCNC: 90 U/L (ref 55–142)
ALT SERPL W P-5'-P-CCNC: 39 U/L (ref 13–56)
ANION GAP SERPL CALCULATED.3IONS-SCNC: 10 MMOL/L (ref 7–16)
AST SERPL W P-5'-P-CCNC: 26 U/L (ref 15–37)
BASOPHILS # BLD AUTO: 0.02 K/UL (ref 0–0.2)
BASOPHILS NFR BLD AUTO: 0.4 % (ref 0–1)
BILIRUB SERPL-MCNC: 0.4 MG/DL (ref ?–1.2)
BUN SERPL-MCNC: 28 MG/DL (ref 7–18)
BUN/CREAT SERPL: 29 (ref 6–20)
CALCIUM SERPL-MCNC: 8.6 MG/DL (ref 8.5–10.1)
CHLORIDE SERPL-SCNC: 104 MMOL/L (ref 98–107)
CO2 SERPL-SCNC: 30 MMOL/L (ref 21–32)
CREAT SERPL-MCNC: 0.96 MG/DL (ref 0.55–1.02)
DIFFERENTIAL METHOD BLD: ABNORMAL
EGFR (NO RACE VARIABLE) (RUSH/TITUS): 58 ML/MIN/1.73M²
EOSINOPHIL # BLD AUTO: 0.08 K/UL (ref 0–0.5)
EOSINOPHIL NFR BLD AUTO: 1.8 % (ref 1–4)
ERYTHROCYTE [DISTWIDTH] IN BLOOD BY AUTOMATED COUNT: 13.5 % (ref 11.5–14.5)
GLOBULIN SER-MCNC: 2.7 G/DL (ref 2–4)
GLUCOSE SERPL-MCNC: 82 MG/DL (ref 74–106)
HCT VFR BLD AUTO: 40.3 % (ref 38–47)
HGB BLD-MCNC: 13.2 G/DL (ref 12–16)
LYMPHOCYTES # BLD AUTO: 1.8 K/UL (ref 1–4.8)
LYMPHOCYTES NFR BLD AUTO: 39.9 % (ref 27–41)
MCH RBC QN AUTO: 30.5 PG (ref 27–31)
MCHC RBC AUTO-ENTMCNC: 32.8 G/DL (ref 32–36)
MCV RBC AUTO: 93.1 FL (ref 80–96)
MONOCYTES # BLD AUTO: 0.67 K/UL (ref 0–0.8)
MONOCYTES NFR BLD AUTO: 14.9 % (ref 2–6)
MPC BLD CALC-MCNC: 9.7 FL (ref 9.4–12.4)
NEUTROPHILS # BLD AUTO: 1.94 K/UL (ref 1.8–7.7)
NEUTROPHILS NFR BLD AUTO: 43 % (ref 53–65)
PHOSPHATE SERPL-MCNC: 3.6 MG/DL (ref 2.5–4.5)
PLATELET # BLD AUTO: 238 K/UL (ref 150–400)
POTASSIUM SERPL-SCNC: 3.9 MMOL/L (ref 3.5–5.1)
PROT SERPL-MCNC: 5.9 G/DL (ref 6.4–8.2)
PTH-INTACT SERPL-MCNC: 37.6 PG/ML (ref 18.4–80.1)
RBC # BLD AUTO: 4.33 M/UL (ref 4.2–5.4)
SODIUM SERPL-SCNC: 140 MMOL/L (ref 136–145)
WBC # BLD AUTO: 4.51 K/UL (ref 4.5–11)

## 2023-03-29 PROCEDURE — 83970 ASSAY OF PARATHORMONE: CPT | Performed by: FAMILY MEDICINE

## 2023-03-29 PROCEDURE — 85025 COMPLETE CBC W/AUTO DIFF WBC: CPT | Performed by: FAMILY MEDICINE

## 2023-03-29 PROCEDURE — 82306 VITAMIN D 25 HYDROXY: CPT | Performed by: FAMILY MEDICINE

## 2023-03-29 PROCEDURE — 80053 COMPREHEN METABOLIC PANEL: CPT | Performed by: FAMILY MEDICINE

## 2023-03-29 PROCEDURE — 84100 ASSAY OF PHOSPHORUS: CPT | Performed by: FAMILY MEDICINE

## 2023-05-16 ENCOUNTER — LAB REQUISITION (OUTPATIENT)
Dept: LAB | Facility: HOSPITAL | Age: 83
End: 2023-05-16
Attending: FAMILY MEDICINE
Payer: MEDICARE

## 2023-05-16 DIAGNOSIS — N18.30 CHRONIC KIDNEY DISEASE, STAGE 3 UNSPECIFIED: ICD-10-CM

## 2023-05-16 DIAGNOSIS — E03.9 HYPOTHYROIDISM, UNSPECIFIED: ICD-10-CM

## 2023-05-16 DIAGNOSIS — E78.5 HYPERLIPIDEMIA, UNSPECIFIED: ICD-10-CM

## 2023-05-16 DIAGNOSIS — I27.20 PULMONARY HYPERTENSION, UNSPECIFIED: ICD-10-CM

## 2023-05-16 LAB
ALBUMIN SERPL BCP-MCNC: 2.9 G/DL (ref 3.5–5)
ALBUMIN/GLOB SERPL: 1 {RATIO}
ALP SERPL-CCNC: 94 U/L (ref 55–142)
ALT SERPL W P-5'-P-CCNC: 31 U/L (ref 13–56)
ANION GAP SERPL CALCULATED.3IONS-SCNC: 13 MMOL/L (ref 7–16)
AST SERPL W P-5'-P-CCNC: 24 U/L (ref 15–37)
BASOPHILS # BLD AUTO: 0.02 K/UL (ref 0–0.2)
BASOPHILS NFR BLD AUTO: 0.5 % (ref 0–1)
BILIRUB SERPL-MCNC: 0.5 MG/DL (ref ?–1.2)
BUN SERPL-MCNC: 29 MG/DL (ref 7–18)
BUN/CREAT SERPL: 28 (ref 6–20)
CALCIUM SERPL-MCNC: 7.9 MG/DL (ref 8.5–10.1)
CHLORIDE SERPL-SCNC: 106 MMOL/L (ref 98–107)
CHOLEST SERPL-MCNC: 109 MG/DL (ref 0–200)
CHOLEST/HDLC SERPL: 2.2 {RATIO}
CK SERPL-CCNC: 29 U/L (ref 26–192)
CO2 SERPL-SCNC: 25 MMOL/L (ref 21–32)
CREAT SERPL-MCNC: 1.02 MG/DL (ref 0.55–1.02)
DIFFERENTIAL METHOD BLD: ABNORMAL
EGFR (NO RACE VARIABLE) (RUSH/TITUS): 54 ML/MIN/1.73M2
EOSINOPHIL # BLD AUTO: 0.15 K/UL (ref 0–0.5)
EOSINOPHIL NFR BLD AUTO: 3.4 % (ref 1–4)
ERYTHROCYTE [DISTWIDTH] IN BLOOD BY AUTOMATED COUNT: 14 % (ref 11.5–14.5)
GLOBULIN SER-MCNC: 2.8 G/DL (ref 2–4)
GLUCOSE SERPL-MCNC: 73 MG/DL (ref 74–106)
HCT VFR BLD AUTO: 39.9 % (ref 38–47)
HDLC SERPL-MCNC: 49 MG/DL (ref 40–60)
HGB BLD-MCNC: 13 G/DL (ref 12–16)
LDLC SERPL CALC-MCNC: 44 MG/DL
LDLC/HDLC SERPL: 0.9 {RATIO}
LYMPHOCYTES # BLD AUTO: 1.63 K/UL (ref 1–4.8)
LYMPHOCYTES NFR BLD AUTO: 36.9 % (ref 27–41)
MCH RBC QN AUTO: 30.6 PG (ref 27–31)
MCHC RBC AUTO-ENTMCNC: 32.6 G/DL (ref 32–36)
MCV RBC AUTO: 93.9 FL (ref 80–96)
MONOCYTES # BLD AUTO: 0.56 K/UL (ref 0–0.8)
MONOCYTES NFR BLD AUTO: 12.7 % (ref 2–6)
MPC BLD CALC-MCNC: 9.6 FL (ref 9.4–12.4)
NEUTROPHILS # BLD AUTO: 2.06 K/UL (ref 1.8–7.7)
NEUTROPHILS NFR BLD AUTO: 46.5 % (ref 53–65)
NONHDLC SERPL-MCNC: 60 MG/DL
PLATELET # BLD AUTO: 205 K/UL (ref 150–400)
POTASSIUM SERPL-SCNC: 3.9 MMOL/L (ref 3.5–5.1)
PROT SERPL-MCNC: 5.7 G/DL (ref 6.4–8.2)
RBC # BLD AUTO: 4.25 M/UL (ref 4.2–5.4)
SODIUM SERPL-SCNC: 140 MMOL/L (ref 136–145)
TRIGL SERPL-MCNC: 82 MG/DL (ref 35–150)
TSH SERPL DL<=0.005 MIU/L-ACNC: 3.03 UIU/ML (ref 0.36–3.74)
VLDLC SERPL-MCNC: 16 MG/DL
WBC # BLD AUTO: 4.42 K/UL (ref 4.5–11)

## 2023-05-16 PROCEDURE — 85025 COMPLETE CBC W/AUTO DIFF WBC: CPT | Performed by: FAMILY MEDICINE

## 2023-05-16 PROCEDURE — 80053 COMPREHEN METABOLIC PANEL: CPT | Performed by: FAMILY MEDICINE

## 2023-05-16 PROCEDURE — 82550 ASSAY OF CK (CPK): CPT | Performed by: FAMILY MEDICINE

## 2023-05-16 PROCEDURE — 84443 ASSAY THYROID STIM HORMONE: CPT | Performed by: FAMILY MEDICINE

## 2023-05-16 PROCEDURE — 80061 LIPID PANEL: CPT | Performed by: FAMILY MEDICINE

## 2023-05-31 ENCOUNTER — EXTERNAL CHRONIC CARE MANAGEMENT (OUTPATIENT)
Dept: FAMILY MEDICINE | Facility: CLINIC | Age: 83
End: 2023-05-31
Payer: MEDICARE

## 2023-05-31 PROCEDURE — G0511 PR CHRONIC CARE MGMT, RHC OR FQHC ONLY, 20 MINS OR MORE: ICD-10-PCS | Mod: ,,, | Performed by: INTERNAL MEDICINE

## 2023-05-31 PROCEDURE — G0511 CCM/BHI BY RHC/FQHC 20MIN MO: HCPCS | Mod: ,,, | Performed by: INTERNAL MEDICINE

## 2023-07-26 ENCOUNTER — OFFICE VISIT (OUTPATIENT)
Dept: CARDIOLOGY | Facility: CLINIC | Age: 83
End: 2023-07-26
Payer: MEDICARE

## 2023-07-26 VITALS
HEART RATE: 61 BPM | OXYGEN SATURATION: 99 % | SYSTOLIC BLOOD PRESSURE: 110 MMHG | HEIGHT: 63 IN | DIASTOLIC BLOOD PRESSURE: 76 MMHG | WEIGHT: 100.13 LBS | BODY MASS INDEX: 17.74 KG/M2

## 2023-07-26 DIAGNOSIS — I48.91 ATRIAL FIBRILLATION, UNSPECIFIED TYPE: Primary | ICD-10-CM

## 2023-07-26 DIAGNOSIS — I25.10 CORONARY ARTERY DISEASE INVOLVING NATIVE CORONARY ARTERY OF NATIVE HEART WITHOUT ANGINA PECTORIS: ICD-10-CM

## 2023-07-26 DIAGNOSIS — I10 PRIMARY HYPERTENSION: ICD-10-CM

## 2023-07-26 DIAGNOSIS — E78.5 HYPERLIPIDEMIA, UNSPECIFIED HYPERLIPIDEMIA TYPE: ICD-10-CM

## 2023-07-26 PROCEDURE — 93005 ELECTROCARDIOGRAM TRACING: CPT | Mod: PBBFAC | Performed by: INTERNAL MEDICINE

## 2023-07-26 PROCEDURE — 99214 PR OFFICE/OUTPT VISIT, EST, LEVL IV, 30-39 MIN: ICD-10-PCS | Mod: S$PBB,,, | Performed by: NURSE PRACTITIONER

## 2023-07-26 PROCEDURE — 99214 OFFICE O/P EST MOD 30 MIN: CPT | Mod: S$PBB,,, | Performed by: NURSE PRACTITIONER

## 2023-07-26 PROCEDURE — 93010 EKG 12-LEAD: ICD-10-PCS | Mod: S$PBB,,, | Performed by: INTERNAL MEDICINE

## 2023-07-26 PROCEDURE — 99214 OFFICE O/P EST MOD 30 MIN: CPT | Mod: PBBFAC | Performed by: NURSE PRACTITIONER

## 2023-07-26 PROCEDURE — 93010 ELECTROCARDIOGRAM REPORT: CPT | Mod: S$PBB,,, | Performed by: INTERNAL MEDICINE

## 2023-07-26 RX ORDER — ACETAMINOPHEN 500 MG
500 TABLET ORAL EVERY 6 HOURS PRN
COMMUNITY

## 2023-07-26 RX ORDER — APIXABAN 2.5 MG/1
2.5 TABLET, FILM COATED ORAL 2 TIMES DAILY
COMMUNITY
Start: 2023-07-24

## 2023-07-26 RX ORDER — MULTIVITAMIN
1 TABLET ORAL DAILY
COMMUNITY

## 2023-07-26 RX ORDER — POLYETHYLENE GLYCOL 3350 17 G/17G
17 POWDER, FOR SOLUTION ORAL DAILY
COMMUNITY

## 2023-07-26 RX ORDER — METOCLOPRAMIDE 5 MG/1
5 TABLET ORAL 4 TIMES DAILY
COMMUNITY

## 2023-07-26 NOTE — PROGRESS NOTES
PCP: Mandeep Branham MD    Referring Provider:     Subjective:   Abdias Carver is a 82 y.o. female with hx of mild, nonobstructive CAD, A fib, GERD, HLD, and HTN,  who presents for routine follow up. She denies complaints other than lower ext edema that resolves with elevation.         Fhx:  Family History   Problem Relation Age of Onset    Diabetes Mother     Hypertension Mother     Heart disease Mother     Cancer Mother     Heart disease Father     Hypertension Father     Parkinsonism Sister     Alcohol abuse Daughter      Shx:   Social History     Socioeconomic History    Marital status:    Tobacco Use    Smoking status: Never    Smokeless tobacco: Never   Substance and Sexual Activity    Alcohol use: Never    Drug use: Never    Sexual activity: Not Currently     Social Determinants of Health     Financial Resource Strain: Low Risk     Difficulty of Paying Living Expenses: Not hard at all   Food Insecurity: No Food Insecurity    Worried About Running Out of Food in the Last Year: Never true    Ran Out of Food in the Last Year: Never true   Transportation Needs: No Transportation Needs    Lack of Transportation (Medical): No    Lack of Transportation (Non-Medical): No   Physical Activity: Inactive    Days of Exercise per Week: 0 days    Minutes of Exercise per Session: 0 min   Stress: No Stress Concern Present    Feeling of Stress : Not at all   Social Connections: Socially Isolated    Frequency of Communication with Friends and Family: More than three times a week    Frequency of Social Gatherings with Friends and Family: More than three times a week    Attends [employer] Services: Never    Active Member of Clubs or Organizations: No    Attends Club or Organization Meetings: Never    Marital Status:    Housing Stability: Low Risk     Unable to Pay for Housing in the Last Year: No    Number of Places Lived in the Last Year: 1    Unstable Housing in the Last Year: No       EKG 7/26/2023 a fib with  ventricular rate 105 bpm; septal infarct  2/9/2023 afib with ventricualr rate 96 bpm; anteroseptal infarct  ECHO Results for orders placed during the hospital encounter of 11/08/22    Echo    Interpretation Summary  · Atrial fibrillation observed.  · The left ventricle is normal in size with concentric remodeling and low normal systolic function.  · The estimated ejection fraction is 50%.  · Normal right ventricular size with normal right ventricular systolic function.  · Normal left ventricular diastolic function.  · The mean diastolic gradient across the mitral valve is 3 mmHg at a heart rate of 77 bpm.  · There is mild to moderate mitral stenosis.  · Moderate left atrial enlargement.  · Moderate right atrial enlargement.  · Normal central venous pressure (3 mmHg).  · The estimated PA systolic pressure is 22 mmHg.    [facility] 4/2/2013  Mild, non-obstructive CAD  Janet LV size and systolic function  No significant MR      Lab Results   Component Value Date     05/16/2023    K 3.9 05/16/2023     05/16/2023    CO2 25 05/16/2023    BUN 29 (H) 05/16/2023    CREATININE 1.02 05/16/2023    CALCIUM 7.9 (L) 05/16/2023    ANIONGAP 13 05/16/2023    ESTGFRAFRICA 46 05/09/2021    EGFRNONAA 48 (L) 06/07/2022       Lab Results   Component Value Date    CHOL 109 05/16/2023    CHOL 105 11/12/2022    CHOL 111 12/17/2021     Lab Results   Component Value Date    HDL 49 05/16/2023    HDL 45 11/12/2022    HDL 49 12/17/2021     Lab Results   Component Value Date    LDLCALC 44 05/16/2023    LDLCALC 48 11/12/2022    LDLCALC 51 12/17/2021     Lab Results   Component Value Date    TRIG 82 05/16/2023    TRIG 60 11/12/2022    TRIG 57 12/17/2021     Lab Results   Component Value Date    CHOLHDL 2.2 05/16/2023    CHOLHDL 2.3 11/12/2022    CHOLHDL 2.3 12/17/2021       Lab Results   Component Value Date    WBC 4.42 (L) 05/16/2023    HGB 13.0 05/16/2023    HCT 39.9 05/16/2023    MCV 93.9 05/16/2023     05/16/2023           Current  Outpatient Medications:     acetaminophen (TYLENOL) 500 MG tablet, Take 500 mg by mouth every 6 (six) hours as needed for Pain., Disp: , Rfl:     aspirin 81 MG Chew, Take 1 tablet (81 mg total) by mouth once daily., Disp: 30 tablet, Rfl: 0    atorvastatin (LIPITOR) 40 MG tablet, Take 1 tablet (40 mg total) by mouth every evening., Disp: 30 tablet, Rfl: 5    diltiaZEM (CARDIZEM) 60 MG tablet, Take 1 tablet (60 mg total) by mouth every 6 (six) hours., Disp: 120 tablet, Rfl: 11    ELIQUIS 2.5 mg Tab, Take 2.5 mg by mouth 2 (two) times daily., Disp: , Rfl:     furosemide (LASIX) 20 MG tablet, Take 0.5 tablets (10 mg total) by mouth once daily., Disp: 30 tablet, Rfl: 0    levothyroxine (SYNTHROID) 75 MCG tablet, Take 1 tablet (75 mcg total) by mouth once daily., Disp: 30 tablet, Rfl: 5    metoclopramide HCl (REGLAN) 10 MG tablet, Take 10 mg by mouth 4 (four) times daily., Disp: , Rfl:     multivitamin (ONE DAILY MULTIVITAMIN) per tablet, Take 1 tablet by mouth once daily., Disp: , Rfl:     nitroGLYCERIN (NITROSTAT) 0.4 MG SL tablet, Place 1 tablet (0.4 mg total) under the tongue every 5 (five) minutes as needed for Chest pain. Put 1 tablet under your tongue as needed for chest pain; may take up to 3 doses 5 minutes apart. If no resolutiion of CP go to the nearest ED for evaluation., Disp: 25 tablet, Rfl: 3    omeprazole (PRILOSEC OTC) 20 MG tablet, Take 1 tablet (20 mg total) by mouth once daily., Disp: 30 tablet, Rfl: 5    polyethylene glycol (GLYCOLAX) 17 gram/dose powder, Take 17 g by mouth once daily., Disp: , Rfl:     cephALEXin (KEFLEX) 500 MG capsule, Take 1 capsule (500 mg total) by mouth every 6 (six) hours. (Patient not taking: Reported on 7/26/2023), Disp: 28 capsule, Rfl: 0  Meds reviewed and reconciled using the list faxed over from the nursing home.       Review of Systems   Respiratory:  Negative for shortness of breath.    Cardiovascular:  Positive for leg swelling. Negative for chest pain, palpitations,  "orthopnea, claudication and PND.        Edema resolves with elevation    Patient denies chest pain when I questioned her.    Neurological:  Negative for dizziness, loss of consciousness and weakness.         Objective:   /76 (BP Location: Left arm, Patient Position: Sitting)   Pulse 61   Ht 5' 3" (1.6 m)   Wt 45.4 kg (100 lb 1.9 oz)   SpO2 99%   BMI 17.74 kg/m²     Physical Exam  Vitals and nursing note reviewed.   Constitutional:       Appearance: Normal appearance. She is normal weight.      Comments: In a wheelchair   HENT:      Head: Normocephalic and atraumatic.   Neck:      Vascular: No carotid bruit.   Cardiovascular:      Rate and Rhythm: Normal rate and regular rhythm.      Pulses: Normal pulses.      Heart sounds: Normal heart sounds.   Pulmonary:      Effort: Pulmonary effort is normal.      Breath sounds: Normal breath sounds.   Abdominal:      Palpations: Abdomen is soft.   Musculoskeletal:      Cervical back: Neck supple.      Right lower leg: Edema present.      Left lower leg: Edema present.      Comments: 1+ np edema BLE   Skin:     General: Skin is warm and dry.      Capillary Refill: Capillary refill takes less than 2 seconds.   Neurological:      General: No focal deficit present.      Mental Status: She is alert.         Assessment:     1. Atrial fibrillation, unspecified type  EKG 12-lead    EKG 12-lead      2. Coronary artery disease involving native coronary artery of native heart without angina pectoris        3. Hyperlipidemia, unspecified hyperlipidemia type        4. Primary hypertension              Plan:   Atrial fibrillation    Eliquis for CVA prophylaxis    Coronary artery disease involving native coronary artery of native heart  Asymptomatic  Continue ASA/Lipitor    Hyperlipidemia  Lipid panel reviewed from 5/16/2023    Trig 82  HDL 49  LDL 44  Lipitor 40 mg po daily  Lipids followed by PCP    Hypertension  110/76 mmHg        RTC 6 months  "

## 2023-07-26 NOTE — ASSESSMENT & PLAN NOTE
Lipid panel reviewed from 5/16/2023    Trig 82  HDL 49  LDL 44  Lipitor 40 mg po daily  Lipids followed by PCP

## 2023-07-31 ENCOUNTER — EXTERNAL CHRONIC CARE MANAGEMENT (OUTPATIENT)
Dept: FAMILY MEDICINE | Facility: CLINIC | Age: 83
End: 2023-07-31
Payer: MEDICARE

## 2023-07-31 PROCEDURE — G0511 PR CHRONIC CARE MGMT, RHC OR FQHC ONLY, 20 MINS OR MORE: ICD-10-PCS | Mod: ,,, | Performed by: INTERNAL MEDICINE

## 2023-07-31 PROCEDURE — G0511 CCM/BHI BY RHC/FQHC 20MIN MO: HCPCS | Mod: ,,, | Performed by: INTERNAL MEDICINE

## 2023-09-19 ENCOUNTER — EXTERNAL VISIT (OUTPATIENT)
Dept: FAMILY MEDICINE | Facility: CLINIC | Age: 83
End: 2023-09-19
Payer: MEDICARE

## 2023-09-19 DIAGNOSIS — I10 PRIMARY HYPERTENSION: ICD-10-CM

## 2023-09-19 DIAGNOSIS — I48.11 LONGSTANDING PERSISTENT ATRIAL FIBRILLATION: ICD-10-CM

## 2023-09-19 DIAGNOSIS — F03.90 DEMENTIA WITHOUT BEHAVIORAL DISTURBANCE: ICD-10-CM

## 2023-09-19 PROCEDURE — 99214 PR OFFICE/OUTPT VISIT, EST, LEVL IV, 30-39 MIN: ICD-10-PCS | Mod: ,,, | Performed by: FAMILY MEDICINE

## 2023-09-19 PROCEDURE — 99214 OFFICE O/P EST MOD 30 MIN: CPT | Mod: ,,, | Performed by: FAMILY MEDICINE

## 2023-09-19 NOTE — PROGRESS NOTES
Subjective:       Patient ID: Abdias Carver is a 82 y.o. female.    Chief Complaint: No chief complaint on file.    Patient is a 83 y/o female who was evaluated and examined today during routine f/u at [facility]. Patient is at her baseline. Vitals stable. No complaints at this time. Will continue with current management and monitoring.              Current Outpatient Medications:     acetaminophen (TYLENOL) 500 MG tablet, Take 500 mg by mouth every 6 (six) hours as needed for Pain., Disp: , Rfl:     aspirin 81 MG Chew, Take 1 tablet (81 mg total) by mouth once daily., Disp: 30 tablet, Rfl: 0    atorvastatin (LIPITOR) 40 MG tablet, Take 1 tablet (40 mg total) by mouth every evening., Disp: 30 tablet, Rfl: 5    cephALEXin (KEFLEX) 500 MG capsule, Take 1 capsule (500 mg total) by mouth every 6 (six) hours. (Patient not taking: Reported on 7/26/2023), Disp: 28 capsule, Rfl: 0    diltiaZEM (CARDIZEM) 60 MG tablet, Take 1 tablet (60 mg total) by mouth every 6 (six) hours., Disp: 120 tablet, Rfl: 11    ELIQUIS 2.5 mg Tab, Take 2.5 mg by mouth 2 (two) times daily., Disp: , Rfl:     furosemide (LASIX) 20 MG tablet, Take 0.5 tablets (10 mg total) by mouth once daily., Disp: 30 tablet, Rfl: 0    levothyroxine (SYNTHROID) 75 MCG tablet, Take 1 tablet (75 mcg total) by mouth once daily., Disp: 30 tablet, Rfl: 5    metoclopramide HCl (REGLAN) 10 MG tablet, Take 10 mg by mouth 4 (four) times daily., Disp: , Rfl:     multivitamin (ONE DAILY MULTIVITAMIN) per tablet, Take 1 tablet by mouth once daily., Disp: , Rfl:     nitroGLYCERIN (NITROSTAT) 0.4 MG SL tablet, Place 1 tablet (0.4 mg total) under the tongue every 5 (five) minutes as needed for Chest pain. Put 1 tablet under your tongue as needed for chest pain; may take up to 3 doses 5 minutes apart. If no resolutiion of CP go to the nearest ED for evaluation., Disp: 25 tablet, Rfl: 3    omeprazole (PRILOSEC OTC) 20 MG tablet, Take 1 tablet (20 mg total) by mouth once  daily., Disp: 30 tablet, Rfl: 5    polyethylene glycol (GLYCOLAX) 17 gram/dose powder, Take 17 g by mouth once daily., Disp: , Rfl:     Review of patient's allergies indicates:  No Known Allergies    Past Medical History:   Diagnosis Date    Atrial fibrillation     Coronary artery disease     CVA (cerebral vascular accident)     2021    GERD (gastroesophageal reflux disease)     Hyperlipidemia     Hypertension     Hypothyroidism     Pulmonary emboli     PVD (peripheral vascular disease)     Vitamin D deficiency        Past Surgical History:   Procedure Laterality Date    APPENDECTOMY      CARDIAC CATHETERIZATION Left 04/2013    CATARACT EXTRACTION         Family History   Problem Relation Age of Onset    Diabetes Mother     Hypertension Mother     Heart disease Mother     Cancer Mother     Heart disease Father     Hypertension Father     Parkinsonism Sister     Alcohol abuse Daughter        Social History     Tobacco Use    Smoking status: Never    Smokeless tobacco: Never   Substance Use Topics    Alcohol use: Never    Drug use: Never       Review of Systems   Constitutional:  Negative for activity change, appetite change, chills, fatigue and fever.   HENT:  Negative for nasal congestion, ear discharge, ear pain, hearing loss, mouth sores, rhinorrhea, sinus pressure/congestion, sneezing, sore throat and trouble swallowing.    Eyes:  Negative for pain, discharge, redness, itching and visual disturbance.   Respiratory:  Negative for cough, shortness of breath, wheezing and stridor.    Cardiovascular:  Negative for chest pain, palpitations and leg swelling.   Gastrointestinal:  Negative for abdominal distention, abdominal pain, blood in stool, change in bowel habit, constipation, diarrhea, nausea and vomiting.   Endocrine: Negative for polydipsia, polyphagia and polyuria.   Genitourinary:  Negative for decreased urine volume, difficulty urinating, dysuria, flank pain, frequency, hematuria and urgency.    Musculoskeletal:  Negative for arthralgias, leg pain, myalgias, neck pain and neck stiffness.   Integumentary:  Negative for color change, pallor, rash and wound.   Neurological:  Negative for dizziness, vertigo, tremors, seizures, speech difficulty, weakness, light-headedness, numbness and headaches.   Psychiatric/Behavioral:  Negative for behavioral problems, confusion, decreased concentration and sleep disturbance. The patient is not nervous/anxious.          Current Medications:   Medication List with Changes/Refills   Current Medications    ACETAMINOPHEN (TYLENOL) 500 MG TABLET    Take 500 mg by mouth every 6 (six) hours as needed for Pain.       Start Date: --        End Date: --    ASPIRIN 81 MG CHEW    Take 1 tablet (81 mg total) by mouth once daily.       Start Date: 12/19/2021End Date: 7/26/2023    ATORVASTATIN (LIPITOR) 40 MG TABLET    Take 1 tablet (40 mg total) by mouth every evening.       Start Date: 10/3/2022 End Date: 7/26/2023    CEPHALEXIN (KEFLEX) 500 MG CAPSULE    Take 1 capsule (500 mg total) by mouth every 6 (six) hours.       Start Date: 11/11/2022End Date: --    DILTIAZEM (CARDIZEM) 60 MG TABLET    Take 1 tablet (60 mg total) by mouth every 6 (six) hours.       Start Date: 11/10/2022End Date: 11/10/2023    ELIQUIS 2.5 MG TAB    Take 2.5 mg by mouth 2 (two) times daily.       Start Date: 7/24/2023 End Date: --    FUROSEMIDE (LASIX) 20 MG TABLET    Take 0.5 tablets (10 mg total) by mouth once daily.       Start Date: 2/10/2023 End Date: 2/10/2024    LEVOTHYROXINE (SYNTHROID) 75 MCG TABLET    Take 1 tablet (75 mcg total) by mouth once daily.       Start Date: 10/3/2022 End Date: 7/26/2023    METOCLOPRAMIDE HCL (REGLAN) 10 MG TABLET    Take 10 mg by mouth 4 (four) times daily.       Start Date: --        End Date: --    MULTIVITAMIN (ONE DAILY MULTIVITAMIN) PER TABLET    Take 1 tablet by mouth once daily.       Start Date: --        End Date: --    NITROGLYCERIN (NITROSTAT) 0.4 MG SL TABLET     Place 1 tablet (0.4 mg total) under the tongue every 5 (five) minutes as needed for Chest pain. Put 1 tablet under your tongue as needed for chest pain; may take up to 3 doses 5 minutes apart. If no resolutiion of CP go to the nearest ED for evaluation.       Start Date: 7/8/2021  End Date: --    OMEPRAZOLE (PRILOSEC OTC) 20 MG TABLET    Take 1 tablet (20 mg total) by mouth once daily.       Start Date: 10/3/2022 End Date: 7/26/2023    POLYETHYLENE GLYCOL (GLYCOLAX) 17 GRAM/DOSE POWDER    Take 17 g by mouth once daily.       Start Date: --        End Date: --            Objective:        There were no vitals filed for this visit.    Physical Exam  Vitals and nursing note reviewed.   Constitutional:       General: She is not in acute distress.     Appearance: Normal appearance. She is not toxic-appearing.   HENT:      Head: Normocephalic and atraumatic.      Right Ear: External ear normal.      Left Ear: External ear normal.      Nose: Nose normal.      Mouth/Throat:      Mouth: Mucous membranes are moist.      Pharynx: Oropharynx is clear.   Eyes:      Extraocular Movements: Extraocular movements intact.      Conjunctiva/sclera: Conjunctivae normal.      Pupils: Pupils are equal, round, and reactive to light.   Cardiovascular:      Rate and Rhythm: Normal rate. Rhythm irregular.      Pulses: Normal pulses.   Pulmonary:      Effort: Pulmonary effort is normal. No respiratory distress.      Breath sounds: Normal breath sounds. No wheezing, rhonchi or rales.   Abdominal:      General: Bowel sounds are normal. There is no distension.      Palpations: Abdomen is soft.      Tenderness: There is no abdominal tenderness. There is no right CVA tenderness, left CVA tenderness, guarding or rebound.   Musculoskeletal:         General: Normal range of motion.      Cervical back: Normal range of motion and neck supple.      Right lower leg: Edema present.      Left lower leg: Edema present.   Skin:     General: Skin is warm and  dry.      Capillary Refill: Capillary refill takes less than 2 seconds.      Findings: No rash.   Neurological:      Mental Status: She is alert and oriented to person, place, and time. Mental status is at baseline.      Cranial Nerves: No cranial nerve deficit.      Sensory: No sensory deficit.   Psychiatric:         Mood and Affect: Mood normal.         Behavior: Behavior normal.         Thought Content: Thought content normal.         Judgment: Judgment normal.               Lab Results   Component Value Date    WBC 4.42 (L) 05/16/2023    HGB 13.0 05/16/2023    HCT 39.9 05/16/2023     05/16/2023    CHOL 109 05/16/2023    TRIG 82 05/16/2023    HDL 49 05/16/2023    ALT 31 05/16/2023    AST 24 05/16/2023     05/16/2023    K 3.9 05/16/2023     05/16/2023    CREATININE 1.02 05/16/2023    BUN 29 (H) 05/16/2023    CO2 25 05/16/2023    TSH 3.028 05/16/2023    INR 1.31 (H) 12/17/2021      Assessment:       1. Dementia without behavioral disturbance    2. Longstanding persistent atrial fibrillation    3. Primary hypertension        Plan:         Problem List Items Addressed This Visit          Neuro    Dementia without behavioral disturbance     Continue current management and monitor            Cardiac/Vascular    Atrial fibrillation      on Eliquis for cva prophylaxis. No change on current medications         Hypertension     BP stable. Continue current management              Follow up in about 3 months (around 12/19/2023).    Leland Hopkins MD     Instructed patient that if symptoms fail to improve or worsen patient should seek immediate medical attention or report to the nearest emergency department. Patient expressed verbal agreement and understanding to this plan of care.

## 2023-10-24 ENCOUNTER — EXTERNAL VISIT (OUTPATIENT)
Dept: FAMILY MEDICINE | Facility: CLINIC | Age: 83
End: 2023-10-24
Payer: MEDICARE

## 2023-10-24 DIAGNOSIS — E03.9 ACQUIRED HYPOTHYROIDISM: ICD-10-CM

## 2023-10-24 DIAGNOSIS — I48.11 LONGSTANDING PERSISTENT ATRIAL FIBRILLATION: Primary | ICD-10-CM

## 2023-10-24 DIAGNOSIS — I25.10 CORONARY ARTERY DISEASE INVOLVING NATIVE CORONARY ARTERY OF NATIVE HEART WITHOUT ANGINA PECTORIS: ICD-10-CM

## 2023-10-24 DIAGNOSIS — Z86.73 HISTORY OF CVA (CEREBROVASCULAR ACCIDENT): ICD-10-CM

## 2023-10-24 DIAGNOSIS — E78.5 HYPERLIPIDEMIA, UNSPECIFIED HYPERLIPIDEMIA TYPE: ICD-10-CM

## 2023-10-24 DIAGNOSIS — I10 PRIMARY HYPERTENSION: ICD-10-CM

## 2023-10-24 PROCEDURE — 99309 SBSQ NF CARE MODERATE MDM 30: CPT | Mod: ,,, | Performed by: FAMILY MEDICINE

## 2023-10-24 PROCEDURE — 99309 PR NURSING FAC CARE, SUBSEQ, SIGNIF COMPLIC: ICD-10-PCS | Mod: ,,, | Performed by: FAMILY MEDICINE

## 2023-10-24 RX ORDER — ONDANSETRON 4 MG/1
4 TABLET, FILM COATED ORAL EVERY 6 HOURS PRN
COMMUNITY

## 2023-10-25 PROBLEM — F03.90 DEMENTIA WITHOUT BEHAVIORAL DISTURBANCE: Status: RESOLVED | Noted: 2021-08-13 | Resolved: 2023-10-25

## 2023-10-25 NOTE — ASSESSMENT & PLAN NOTE
Currently controlled rate without symptoms.  Currently on apixaban.  No change in treatment.  Follow-up in 3 months.

## 2023-10-25 NOTE — PROGRESS NOTES
Daniel Bob DO   [address]  [place]  [address]      PATIENT NAME: Abdias Carver  : 1940  DATE: 10/24/23  MRN: [id number]      Billing Provider: Daniel Bob DO  Level of Service:   Patient PCP Information       Provider PCP Type    Mandeep Branham MD General            Reason for Visit / Chief Complaint: Nursing Home Visit       Update PCP  Update Chief Complaint         History of Present Illness / Problem Focused Workflow     Abdias Carver presents to the clinic with Nursing Home Visit     History of stroke in past in atrial fibrillation with hypertension hyperlipidemia.  Her labs are well controlled with cholesterol being well within the normal range.  She is had no increasing numbness weakness or tingling.  She does have some memory loss.  Patient is having no problems with eating.  No problems with bowel movements or bladder habits.  No change in mentation.        Review of Systems     Review of Systems   Constitutional:  Negative for activity change, appetite change, chills, fatigue and fever.   HENT:  Negative for nasal congestion, ear discharge, ear pain, mouth dryness, mouth sores, postnasal drip, sinus pressure/congestion, sore throat and voice change.    Eyes:  Negative for pain, discharge, redness, itching and visual disturbance.   Respiratory:  Negative for apnea, cough, chest tightness, shortness of breath and wheezing.    Cardiovascular:  Negative for chest pain, palpitations and leg swelling.   Gastrointestinal:  Negative for abdominal distention, abdominal pain, anal bleeding, blood in stool, change in bowel habit, constipation, diarrhea, nausea, vomiting and reflux.   Endocrine: Negative for cold intolerance, heat intolerance, polydipsia, polyphagia and polyuria.   Genitourinary:  Negative for difficulty urinating, enuresis, frequency, genital sores, hematuria, hot flashes, menstrual irregularity, urgency and vaginal dryness.    Musculoskeletal:  Negative for arthralgias, back pain, gait problem, leg pain, myalgias and neck pain.   Integumentary:  Negative for rash, mole/lesion, breast mass, breast discharge and breast tenderness.   Allergic/Immunologic: Negative for environmental allergies and food allergies.   Neurological:  Positive for weakness, memory loss and coordination difficulties. Negative for dizziness, vertigo, tremors, seizures, syncope, facial asymmetry, speech difficulty, light-headedness, numbness and headaches.   Hematological:  Negative for adenopathy. Does not bruise/bleed easily.   Psychiatric/Behavioral:  Negative for agitation, behavioral problems, confusion, decreased concentration, dysphoric mood, hallucinations, self-injury, sleep disturbance and suicidal ideas. The patient is not nervous/anxious and is not hyperactive.    Breast: Negative for mass and tenderness      Medical / Social / Family History     Past Medical History:   Diagnosis Date    Atrial fibrillation     Coronary artery disease     CVA (cerebral vascular accident)     2021    GERD (gastroesophageal reflux disease)     Hyperlipidemia     Hypertension     Hypothyroidism     Pulmonary emboli     PVD (peripheral vascular disease)     Vitamin D deficiency        Past Surgical History:   Procedure Laterality Date    APPENDECTOMY      CARDIAC CATHETERIZATION Left 04/2013    CATARACT EXTRACTION         Social History  Ms.  reports that she has never smoked. She has never used smokeless tobacco. She reports that she does not drink alcohol and does not use drugs.    Family History  Ms.'s family history includes Alcohol abuse in her daughter; Cancer in her mother; Diabetes in her mother; Heart disease in her father and mother; Hypertension in her father and mother; Parkinsonism in her sister.    Medications and Allergies     Medications  Outpatient Medications Marked as Taking for the 10/24/23 encounter (External Visit) with Daniel Bob DO    Medication Sig Dispense Refill    acetaminophen (TYLENOL) 500 MG tablet Take 500 mg by mouth every 6 (six) hours as needed for Pain.      aspirin 81 MG Chew Take 1 tablet (81 mg total) by mouth once daily. 30 tablet 0    atorvastatin (LIPITOR) 40 MG tablet Take 1 tablet (40 mg total) by mouth every evening. 30 tablet 5    diltiaZEM (CARDIZEM) 60 MG tablet Take 1 tablet (60 mg total) by mouth every 6 (six) hours. 120 tablet 11    ELIQUIS 2.5 mg Tab Take 2.5 mg by mouth 2 (two) times daily.      furosemide (LASIX) 20 MG tablet Take 0.5 tablets (10 mg total) by mouth once daily. 30 tablet 0    levothyroxine (SYNTHROID) 75 MCG tablet Take 1 tablet (75 mcg total) by mouth once daily. 30 tablet 5    metoclopramide HCl (REGLAN) 10 MG tablet Take 10 mg by mouth 4 (four) times daily.      multivitamin (ONE DAILY MULTIVITAMIN) per tablet Take 1 tablet by mouth once daily.      nitroGLYCERIN (NITROSTAT) 0.4 MG SL tablet Place 1 tablet (0.4 mg total) under the tongue every 5 (five) minutes as needed for Chest pain. Put 1 tablet under your tongue as needed for chest pain; may take up to 3 doses 5 minutes apart. If no resolutiion of CP go to the nearest ED for evaluation. 25 tablet 3    omeprazole (PRILOSEC OTC) 20 MG tablet Take 1 tablet (20 mg total) by mouth once daily. 30 tablet 5    ondansetron (ZOFRAN) 4 MG tablet Take 4 mg by mouth every 6 (six) hours as needed for Nausea.      polyethylene glycol (GLYCOLAX) 17 gram/dose powder Take 17 g by mouth once daily.         Allergies  Review of patient's allergies indicates:  No Known Allergies    Physical Examination   There were no vitals filed for this visit.  Physical Exam  Vitals reviewed.   Constitutional:       General: She is not in acute distress.     Appearance: Normal appearance. She is normal weight.   HENT:      Head: Normocephalic and atraumatic.      Nose: Nose normal.      Mouth/Throat:      Mouth: Mucous membranes are moist.      Pharynx: Oropharynx is clear. No  oropharyngeal exudate or posterior oropharyngeal erythema.   Eyes:      General: No scleral icterus.     Extraocular Movements: Extraocular movements intact.      Conjunctiva/sclera: Conjunctivae normal.      Pupils: Pupils are equal, round, and reactive to light.   Neck:      Vascular: No carotid bruit.   Cardiovascular:      Rate and Rhythm: Normal rate and regular rhythm.      Pulses: Normal pulses.      Heart sounds: Murmur heard.      No gallop.   Pulmonary:      Effort: Pulmonary effort is normal.      Breath sounds: Normal breath sounds. No wheezing.   Abdominal:      General: Abdomen is flat. Bowel sounds are normal.      Palpations: Abdomen is soft.   Musculoskeletal:         General: Normal range of motion.      Cervical back: Normal range of motion and neck supple.      Right lower leg: No edema.      Left lower leg: No edema.   Lymphadenopathy:      Cervical: No cervical adenopathy.   Skin:     General: Skin is warm and dry.      Capillary Refill: Capillary refill takes less than 2 seconds.      Coloration: Skin is not jaundiced.      Findings: No lesion.   Neurological:      General: No focal deficit present.      Mental Status: She is alert and oriented to person, place, and time. Mental status is at baseline.      Cranial Nerves: No cranial nerve deficit.      Sensory: No sensory deficit.      Motor: Weakness present.      Coordination: Coordination normal.      Gait: Gait abnormal.      Deep Tendon Reflexes: Reflexes normal.   Psychiatric:         Mood and Affect: Mood normal.         Behavior: Behavior normal.         Thought Content: Thought content normal.         Judgment: Judgment normal.               Lab Results   Component Value Date    WBC 4.42 (L) 05/16/2023    HGB 13.0 05/16/2023    HCT 39.9 05/16/2023    MCV 93.9 05/16/2023     05/16/2023          Sodium   Date Value Ref Range Status   05/16/2023 140 136 - 145 mmol/L Final     Potassium   Date Value Ref Range Status   05/16/2023  3.9 3.5 - 5.1 mmol/L Final     Chloride   Date Value Ref Range Status   05/16/2023 106 98 - 107 mmol/L Final     CO2   Date Value Ref Range Status   05/16/2023 25 21 - 32 mmol/L Final     Glucose   Date Value Ref Range Status   05/16/2023 73 (L) 74 - 106 mg/dL Final     BUN   Date Value Ref Range Status   05/16/2023 29 (H) 7 - 18 mg/dL Final     Creatinine   Date Value Ref Range Status   05/16/2023 1.02 0.55 - 1.02 mg/dL Final     Calcium   Date Value Ref Range Status   05/16/2023 7.9 (L) 8.5 - 10.1 mg/dL Final     Total Protein   Date Value Ref Range Status   05/16/2023 5.7 (L) 6.4 - 8.2 g/dL Final     Albumin   Date Value Ref Range Status   05/16/2023 2.9 (L) 3.5 - 5.0 g/dL Final     Bilirubin, Total   Date Value Ref Range Status   05/16/2023 0.5 >0.0 - 1.2 mg/dL Final     Alk Phos   Date Value Ref Range Status   05/16/2023 94 55 - 142 U/L Final     AST   Date Value Ref Range Status   05/16/2023 24 15 - 37 U/L Final     ALT   Date Value Ref Range Status   05/16/2023 31 13 - 56 U/L Final     Anion Gap   Date Value Ref Range Status   05/16/2023 13 7 - 16 mmol/L Final     eGFR    Date Value Ref Range Status   05/09/2021 46       eGFR   Date Value Ref Range Status   06/07/2022 48 (L) >=60 mL/min/1.73m² Final      X-Ray Chest 1 View  Narrative: EXAMINATION:  XR CHEST 1 VIEW    CLINICAL HISTORY:  Other chest pain    COMPARISON:  Chest x-ray November 8, 2022    TECHNIQUE:  Frontal view/views of the chest.    FINDINGS:  Mild cardiomegaly.  Question subtle patchy ground-glass and interstitial infiltrate/edema within the right greater than left lung.  Suspect trace bilateral pleural fluid.  Visualized osseous and surrounding soft tissue structures appear grossly unchanged.  Impression: As above.    Point of Service: [facility]    Electronically signed by: Cesar Box  Date:    02/09/2023  Time:    20:02     Procedures   Lab Results   Component Value Date    CHOL 109 05/16/2023    CHOL 105  "11/12/2022    CHOL 111 12/17/2021     Lab Results   Component Value Date    HDL 49 05/16/2023    HDL 45 11/12/2022    HDL 49 12/17/2021     Lab Results   Component Value Date    LDLCALC 44 05/16/2023    LDLCALC 48 11/12/2022    LDLCALC 51 12/17/2021     Lab Results   Component Value Date    TRIG 82 05/16/2023    TRIG 60 11/12/2022    TRIG 57 12/17/2021     Lab Results   Component Value Date    CHOLHDL 2.2 05/16/2023    CHOLHDL 2.3 11/12/2022    CHOLHDL 2.3 12/17/2021      No results found for: "LABA1C", "HGBA1C"   Lab Results   Component Value Date    TSH 3.028 05/16/2023      Assessment and Plan (including Health Maintenance)      Problem List  Smart Sets  Document Outside HM   :    Plan:         Health Maintenance Due   Topic Date Due    TETANUS VACCINE  Never done    Shingles Vaccine (1 of 2) Never done    DEXA Scan  01/11/2022    Influenza Vaccine (1) 09/01/2023    COVID-19 Vaccine (3 - 2023-24 season) 09/01/2023       Problem List Items Addressed This Visit          Neuro    History of CVA (cerebrovascular accident)    Current Assessment & Plan     History of CVA currently stable.  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  Will maintain her on her current medicines and continue her apixaban as she is had atrial fib in the past.  Follow-up every 6 months.            Cardiac/Vascular    Hyperlipidemia    Current Assessment & Plan     Last cholesterol was 1 9 and LDL was 44 in May of 2023.  Continue atorvastatin at 40 mg daily.  No change in medicines.  Low-fat low-cholesterol diet.  Recheck in 6 months         Hypertension    Current Assessment & Plan     Hypertension well controlled no change in medication.  Follow-up in 3 months.  Will check CBC and CMP yearly.  Goal is blood pressure 120/70         Coronary artery disease involving native coronary artery of native heart    Overview     Mild, nonobstructive CAD by [facility] 4/2/2013         Current Assessment & Plan     Stable nor " coronary artery disease.  No recurrent symptoms.  Follow-up cardiology yearly.  No change in treatment.  Continue to modify risk factors.         Longstanding persistent atrial fibrillation - Primary    Current Assessment & Plan     Currently controlled rate without symptoms.  Currently on apixaban.  No change in treatment.  Follow-up in 3 months.            Endocrine    Hypothyroidism    Current Assessment & Plan     Clinically euthyroid.  Last TSH was within normal limits.  No change in medication.  Follow-up in 6 months.            Health Maintenance Topics with due status: Not Due       Topic Last Completion Date    Lipid Panel 05/16/2023       Future Appointments   Date Time Provider Department Center   1/29/2024 12:45 PM Temitope [facility], [facility]        Follow up in about 3 months (around 1/24/2024).     Signature:  Daniel Bob DO  [facility]  [address] [place] [address], [place]  [id number]    Date of encounter: 10/24/23

## 2023-10-25 NOTE — ASSESSMENT & PLAN NOTE
Last cholesterol was 1 9 and LDL was 44 in May of 2023.  Continue atorvastatin at 40 mg daily.  No change in medicines.  Low-fat low-cholesterol diet.  Recheck in 6 months

## 2023-10-25 NOTE — ASSESSMENT & PLAN NOTE
Stable atrial fibrillation currently on apixaban to prevent stroke.  Rates have been well controlled.  Currently not on any rate control medicines.  Patient has what appears to be a sinus rhythm on exam.

## 2023-10-25 NOTE — ASSESSMENT & PLAN NOTE
History of CVA currently stable.  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  Will maintain her on her current medicines and continue her apixaban as she is had atrial fib in the past.  Follow-up every 6 months.

## 2023-11-07 ENCOUNTER — EXTERNAL VISIT (OUTPATIENT)
Dept: FAMILY MEDICINE | Facility: CLINIC | Age: 83
End: 2023-11-07
Payer: MEDICARE

## 2023-11-07 DIAGNOSIS — E78.2 MIXED HYPERLIPIDEMIA: Primary | ICD-10-CM

## 2023-11-07 DIAGNOSIS — E03.9 ACQUIRED HYPOTHYROIDISM: ICD-10-CM

## 2023-11-07 DIAGNOSIS — R60.0 LOCALIZED EDEMA: ICD-10-CM

## 2023-11-07 DIAGNOSIS — I10 PRIMARY HYPERTENSION: ICD-10-CM

## 2023-11-07 PROCEDURE — 99309 PR NURSING FAC CARE, SUBSEQ, SIGNIF COMPLIC: ICD-10-PCS | Mod: ,,, | Performed by: FAMILY MEDICINE

## 2023-11-07 PROCEDURE — 99309 SBSQ NF CARE MODERATE MDM 30: CPT | Mod: ,,, | Performed by: FAMILY MEDICINE

## 2023-11-08 ENCOUNTER — LAB REQUISITION (OUTPATIENT)
Dept: LAB | Facility: HOSPITAL | Age: 83
End: 2023-11-08
Attending: FAMILY MEDICINE
Payer: MEDICARE

## 2023-11-08 DIAGNOSIS — R60.9 EDEMA, UNSPECIFIED: ICD-10-CM

## 2023-11-08 PROBLEM — R60.0 LOCALIZED EDEMA: Status: ACTIVE | Noted: 2023-11-08

## 2023-11-08 LAB
ANION GAP SERPL CALCULATED.3IONS-SCNC: 10 MMOL/L (ref 7–16)
BUN SERPL-MCNC: 24 MG/DL (ref 7–18)
BUN/CREAT SERPL: 23 (ref 6–20)
CALCIUM SERPL-MCNC: 8.3 MG/DL (ref 8.5–10.1)
CHLORIDE SERPL-SCNC: 105 MMOL/L (ref 98–107)
CO2 SERPL-SCNC: 29 MMOL/L (ref 21–32)
CREAT SERPL-MCNC: 1.04 MG/DL (ref 0.55–1.02)
D DIMER PPP FEU-MCNC: 0.37 ΜG/ML (ref 0–0.47)
EGFR (NO RACE VARIABLE) (RUSH/TITUS): 53 ML/MIN/1.73M2
GLUCOSE SERPL-MCNC: 68 MG/DL (ref 74–106)
POTASSIUM SERPL-SCNC: 3.9 MMOL/L (ref 3.5–5.1)
SODIUM SERPL-SCNC: 140 MMOL/L (ref 136–145)

## 2023-11-08 PROCEDURE — 85379 FIBRIN DEGRADATION QUANT: CPT | Performed by: FAMILY MEDICINE

## 2023-11-08 PROCEDURE — 80048 BASIC METABOLIC PNL TOTAL CA: CPT | Performed by: FAMILY MEDICINE

## 2023-11-08 NOTE — ASSESSMENT & PLAN NOTE
Localized edema of her lower extremities.  She has a history of atrial fibrillation.  She is currently on Eliquis 2.5 twice daily.  Will obtain a D-dimer as well as BNP.  Continue Lasix at 20 mg daily.  Recheck in 1 week.  If D-dimer positive will do venous ultrasound of her lower extremities

## 2023-11-08 NOTE — ASSESSMENT & PLAN NOTE
Lab Results   Component Value Date    LDLCALC 44 05/16/2023   Last cholesterol was well controlled.  Continue atorvastatin 40 mg daily.  Recheck in 6 months.

## 2023-11-08 NOTE — PROGRESS NOTES
Daniel Bob DO   [address]  [place]  [address]      PATIENT NAME: Abdias Carver  : 1940  DATE: 23  MRN: [id number]      Billing Provider: Daniel Bob DO  Level of Service:   Patient PCP Information       Provider PCP Type    Mandeep Branham MD General            Reason for Visit / Chief Complaint: Nursing Home Visit (edema)       Update PCP  Update Chief Complaint         History of Present Illness / Problem Focused Workflow     Abdias Carver presents to the clinic with Nursing Home Visit (edema)     Patient has some swelling of her lower extremities which is mild.  Has no pain in her extremities.  She does have history of atrial fibrillation in his currently on anticoagulation.  She denies any chest pain shortness breast palpitations PND orthopnea.  History of trauma to her legs.        Review of Systems     Review of Systems   Constitutional:  Negative for activity change, appetite change, chills, fatigue and fever.   HENT:  Negative for nasal congestion, ear discharge, ear pain, mouth dryness, mouth sores, postnasal drip, sinus pressure/congestion, sore throat and voice change.    Eyes:  Negative for pain, discharge, redness, itching and visual disturbance.   Respiratory:  Positive for shortness of breath. Negative for apnea, cough, chest tightness and wheezing.    Cardiovascular:  Positive for leg swelling. Negative for chest pain and palpitations.   Gastrointestinal:  Negative for abdominal distention, abdominal pain, anal bleeding, blood in stool, change in bowel habit, constipation, diarrhea, nausea, vomiting and reflux.   Endocrine: Negative for cold intolerance, heat intolerance, polydipsia, polyphagia and polyuria.   Genitourinary:  Negative for difficulty urinating, enuresis, frequency, genital sores, hematuria, hot flashes, menstrual irregularity, urgency and vaginal dryness.   Musculoskeletal:  Negative for arthralgias, back pain,  gait problem, leg pain, myalgias and neck pain.   Integumentary:  Negative for rash, mole/lesion, breast mass, breast discharge and breast tenderness.   Allergic/Immunologic: Negative for environmental allergies and food allergies.   Neurological:  Negative for dizziness, vertigo, tremors, seizures, syncope, facial asymmetry, speech difficulty, weakness, light-headedness, numbness, headaches, memory loss and coordination difficulties.   Hematological:  Negative for adenopathy. Does not bruise/bleed easily.   Psychiatric/Behavioral:  Negative for agitation, behavioral problems, confusion, decreased concentration, dysphoric mood, hallucinations, self-injury, sleep disturbance and suicidal ideas. The patient is not nervous/anxious and is not hyperactive.    Breast: Negative for mass and tenderness      Medical / Social / Family History     Past Medical History:   Diagnosis Date    Atrial fibrillation     Coronary artery disease     CVA (cerebral vascular accident)     2021    GERD (gastroesophageal reflux disease)     Hyperlipidemia     Hypertension     Hypothyroidism     Pulmonary emboli     PVD (peripheral vascular disease)     Vitamin D deficiency        Past Surgical History:   Procedure Laterality Date    APPENDECTOMY      CARDIAC CATHETERIZATION Left 04/2013    CATARACT EXTRACTION         Social History  Ms.  reports that she has never smoked. She has never used smokeless tobacco. She reports that she does not drink alcohol and does not use drugs.    Family History  Ms.'s family history includes Alcohol abuse in her daughter; Cancer in her mother; Diabetes in her mother; Heart disease in her father and mother; Hypertension in her father and mother; Parkinsonism in her sister.    Medications and Allergies     Medications  Outpatient Medications Marked as Taking for the 11/7/23 encounter (External Visit) with Daniel Bob, DO   Medication Sig Dispense Refill    acetaminophen (TYLENOL) 500 MG tablet Take 500 mg  by mouth every 6 (six) hours as needed for Pain.      aspirin 81 MG Chew Take 1 tablet (81 mg total) by mouth once daily. 30 tablet 0    atorvastatin (LIPITOR) 40 MG tablet Take 1 tablet (40 mg total) by mouth every evening. 30 tablet 5    diltiaZEM (CARDIZEM) 60 MG tablet Take 1 tablet (60 mg total) by mouth every 6 (six) hours. 120 tablet 11    ELIQUIS 2.5 mg Tab Take 2.5 mg by mouth 2 (two) times daily.      furosemide (LASIX) 20 MG tablet Take 0.5 tablets (10 mg total) by mouth once daily. 30 tablet 0    levothyroxine (SYNTHROID) 75 MCG tablet Take 1 tablet (75 mcg total) by mouth once daily. 30 tablet 5    metoclopramide HCl (REGLAN) 5 MG tablet Take 5 mg by mouth 4 (four) times daily.      multivitamin (ONE DAILY MULTIVITAMIN) per tablet Take 1 tablet by mouth once daily.      nitroGLYCERIN (NITROSTAT) 0.4 MG SL tablet Place 1 tablet (0.4 mg total) under the tongue every 5 (five) minutes as needed for Chest pain. Put 1 tablet under your tongue as needed for chest pain; may take up to 3 doses 5 minutes apart. If no resolutiion of CP go to the nearest ED for evaluation. 25 tablet 3    omeprazole (PRILOSEC OTC) 20 MG tablet Take 1 tablet (20 mg total) by mouth once daily. 30 tablet 5    ondansetron (ZOFRAN) 4 MG tablet Take 4 mg by mouth every 6 (six) hours as needed for Nausea.      polyethylene glycol (GLYCOLAX) 17 gram/dose powder Take 17 g by mouth once daily.         Allergies  Review of patient's allergies indicates:  No Known Allergies    Physical Examination   There were no vitals filed for this visit.  Physical Exam  Vitals reviewed.   Constitutional:       Appearance: Normal appearance. She is normal weight.   HENT:      Head: Normocephalic and atraumatic.      Nose: Nose normal.      Mouth/Throat:      Mouth: Mucous membranes are moist.      Pharynx: Oropharynx is clear. No oropharyngeal exudate or posterior oropharyngeal erythema.   Eyes:      General: No scleral icterus.     Extraocular Movements:  Extraocular movements intact.      Conjunctiva/sclera: Conjunctivae normal.      Pupils: Pupils are equal, round, and reactive to light.   Neck:      Vascular: No carotid bruit.   Cardiovascular:      Rate and Rhythm: Normal rate and regular rhythm.      Pulses: Normal pulses.      Heart sounds: Normal heart sounds. No murmur heard.     No friction rub. No gallop.   Pulmonary:      Effort: Pulmonary effort is normal.      Breath sounds: Normal breath sounds. No wheezing.   Abdominal:      General: Abdomen is flat. Bowel sounds are normal.      Palpations: Abdomen is soft.      Tenderness: There is no abdominal tenderness.   Musculoskeletal:         General: Normal range of motion.      Cervical back: Normal range of motion and neck supple.      Right lower leg: Edema present.      Left lower leg: Edema present.   Lymphadenopathy:      Cervical: No cervical adenopathy.   Skin:     General: Skin is warm and dry.      Capillary Refill: Capillary refill takes less than 2 seconds.      Coloration: Skin is not jaundiced.      Findings: No lesion.   Neurological:      General: No focal deficit present.      Mental Status: She is alert and oriented to person, place, and time. Mental status is at baseline.      Cranial Nerves: No cranial nerve deficit.      Sensory: No sensory deficit.      Motor: No weakness.      Coordination: Coordination normal.      Gait: Gait normal.      Deep Tendon Reflexes: Reflexes normal.   Psychiatric:         Mood and Affect: Mood normal.         Behavior: Behavior normal.         Thought Content: Thought content normal.         Judgment: Judgment normal.               Lab Results   Component Value Date    WBC 4.42 (L) 05/16/2023    HGB 13.0 05/16/2023    HCT 39.9 05/16/2023    MCV 93.9 05/16/2023     05/16/2023          Sodium   Date Value Ref Range Status   11/08/2023 140 136 - 145 mmol/L Final     Potassium   Date Value Ref Range Status   11/08/2023 3.9 3.5 - 5.1 mmol/L Final      Chloride   Date Value Ref Range Status   11/08/2023 105 98 - 107 mmol/L Final     CO2   Date Value Ref Range Status   11/08/2023 29 21 - 32 mmol/L Final     Glucose   Date Value Ref Range Status   11/08/2023 68 (L) 74 - 106 mg/dL Final     BUN   Date Value Ref Range Status   11/08/2023 24 (H) 7 - 18 mg/dL Final     Creatinine   Date Value Ref Range Status   11/08/2023 1.04 (H) 0.55 - 1.02 mg/dL Final     Calcium   Date Value Ref Range Status   11/08/2023 8.3 (L) 8.5 - 10.1 mg/dL Final     Total Protein   Date Value Ref Range Status   05/16/2023 5.7 (L) 6.4 - 8.2 g/dL Final     Albumin   Date Value Ref Range Status   05/16/2023 2.9 (L) 3.5 - 5.0 g/dL Final     Bilirubin, Total   Date Value Ref Range Status   05/16/2023 0.5 >0.0 - 1.2 mg/dL Final     Alk Phos   Date Value Ref Range Status   05/16/2023 94 55 - 142 U/L Final     AST   Date Value Ref Range Status   05/16/2023 24 15 - 37 U/L Final     ALT   Date Value Ref Range Status   05/16/2023 31 13 - 56 U/L Final     Anion Gap   Date Value Ref Range Status   11/08/2023 10 7 - 16 mmol/L Final     eGFR    Date Value Ref Range Status   05/09/2021 46       eGFR   Date Value Ref Range Status   06/07/2022 48 (L) >=60 mL/min/1.73m² Final      X-Ray Chest 1 View  Narrative: EXAMINATION:  XR CHEST 1 VIEW    CLINICAL HISTORY:  Other chest pain    COMPARISON:  Chest x-ray November 8, 2022    TECHNIQUE:  Frontal view/views of the chest.    FINDINGS:  Mild cardiomegaly.  Question subtle patchy ground-glass and interstitial infiltrate/edema within the right greater than left lung.  Suspect trace bilateral pleural fluid.  Visualized osseous and surrounding soft tissue structures appear grossly unchanged.  Impression: As above.    Point of Service: [facility]    Electronically signed by: Cesar Box  Date:    02/09/2023  Time:    20:02     Procedures   Lab Results   Component Value Date    CHOL 109 05/16/2023    CHOL 105 11/12/2022    CHOL 111  "12/17/2021     Lab Results   Component Value Date    HDL 49 05/16/2023    HDL 45 11/12/2022    HDL 49 12/17/2021     Lab Results   Component Value Date    LDLCALC 44 05/16/2023    LDLCALC 48 11/12/2022    LDLCALC 51 12/17/2021     Lab Results   Component Value Date    TRIG 82 05/16/2023    TRIG 60 11/12/2022    TRIG 57 12/17/2021     Lab Results   Component Value Date    CHOLHDL 2.2 05/16/2023    CHOLHDL 2.3 11/12/2022    CHOLHDL 2.3 12/17/2021      No results found for: "LABA1C", "HGBA1C"   Lab Results   Component Value Date    TSH 3.028 05/16/2023      Assessment and Plan (including Health Maintenance)      Problem List  Smart Sets  Document Outside HM   :    Plan:         Health Maintenance Due   Topic Date Due    TETANUS VACCINE  Never done    Shingles Vaccine (1 of 2) Never done    RSV Vaccine (Age 60+) (1 - 1-dose 60+ series) Never done    DEXA Scan  01/11/2022    Influenza Vaccine (1) 09/01/2023    COVID-19 Vaccine (3 - 2023-24 season) 09/01/2023       Problem List Items Addressed This Visit          Cardiac/Vascular    Hyperlipidemia - Primary    Current Assessment & Plan     Lab Results   Component Value Date    LDLCALC 44 05/16/2023   Last cholesterol was well controlled.  Continue atorvastatin 40 mg daily.  Recheck in 6 months.         Hypertension    Current Assessment & Plan     Hypertension well controlled no change in medication.  Follow-up in 3 months.  Will check CBC and CMP yearly.  Goal is blood pressure 120/70            Endocrine    Hypothyroidism    Current Assessment & Plan     Lab Results   Component Value Date    TSH 3.028 05/16/2023   Clinically euthyroid.  Last TSH was within normal limits.  No change in medication.  Follow-up in 6 months.            Other    Localized edema    Current Assessment & Plan     Localized edema of her lower extremities.  She has a history of atrial fibrillation.  She is currently on Eliquis 2.5 twice daily.  Will obtain a D-dimer as well as BNP.  Continue Lasix " at 20 mg daily.  Recheck in 1 week.  If D-dimer positive will do venous ultrasound of her lower extremities            Health Maintenance Topics with due status: Not Due       Topic Last Completion Date    Lipid Panel 05/16/2023       Future Appointments   Date Time Provider Department Center   1/29/2024 12:45 PM [facility] Davis[facility]        Follow up in about 4 weeks (around 12/5/2023), or if symptoms worsen or fail to improve.     Signature:  Daniel Bob DO  [address]  [address]    Date of encounter: 11/7/23

## 2023-11-08 NOTE — ASSESSMENT & PLAN NOTE
Lab Results   Component Value Date    TSH 3.028 05/16/2023   Clinically euthyroid.  Last TSH was within normal limits.  No change in medication.  Follow-up in 6 months.

## 2023-11-17 ENCOUNTER — LAB REQUISITION (OUTPATIENT)
Dept: LAB | Facility: HOSPITAL | Age: 83
End: 2023-11-17
Attending: FAMILY MEDICINE
Payer: MEDICARE

## 2023-11-17 DIAGNOSIS — I50.30 UNSPECIFIED DIASTOLIC (CONGESTIVE) HEART FAILURE: ICD-10-CM

## 2023-11-17 DIAGNOSIS — N18.30 CHRONIC KIDNEY DISEASE, STAGE 3 UNSPECIFIED: ICD-10-CM

## 2023-11-17 DIAGNOSIS — E78.5 HYPERLIPIDEMIA, UNSPECIFIED: ICD-10-CM

## 2023-11-17 DIAGNOSIS — E03.9 HYPOTHYROIDISM, UNSPECIFIED: ICD-10-CM

## 2023-11-17 DIAGNOSIS — I48.11 LONGSTANDING PERSISTENT ATRIAL FIBRILLATION: ICD-10-CM

## 2023-11-17 DIAGNOSIS — I69.351 HEMIPLEGIA AND HEMIPARESIS FOLLOWING CEREBRAL INFARCTION AFFECTING RIGHT DOMINANT SIDE: ICD-10-CM

## 2023-11-17 LAB
ALBUMIN SERPL BCP-MCNC: 2.8 G/DL (ref 3.5–5)
ALBUMIN/GLOB SERPL: 0.8 {RATIO}
ALP SERPL-CCNC: 87 U/L (ref 55–142)
ALT SERPL W P-5'-P-CCNC: 20 U/L (ref 13–56)
ANION GAP SERPL CALCULATED.3IONS-SCNC: 8 MMOL/L (ref 7–16)
AST SERPL W P-5'-P-CCNC: 17 U/L (ref 15–37)
BASOPHILS # BLD AUTO: 0.03 K/UL (ref 0–0.2)
BASOPHILS NFR BLD AUTO: 0.7 % (ref 0–1)
BILIRUB SERPL-MCNC: 0.3 MG/DL (ref ?–1.2)
BUN SERPL-MCNC: 22 MG/DL (ref 7–18)
BUN/CREAT SERPL: 18 (ref 6–20)
CALCIUM SERPL-MCNC: 8.1 MG/DL (ref 8.5–10.1)
CHLORIDE SERPL-SCNC: 105 MMOL/L (ref 98–107)
CHOLEST SERPL-MCNC: 119 MG/DL (ref 0–200)
CHOLEST/HDLC SERPL: 2.6 {RATIO}
CK SERPL-CCNC: 28 U/L (ref 26–192)
CO2 SERPL-SCNC: 32 MMOL/L (ref 21–32)
CREAT SERPL-MCNC: 1.23 MG/DL (ref 0.55–1.02)
DIFFERENTIAL METHOD BLD: ABNORMAL
EGFR (NO RACE VARIABLE) (RUSH/TITUS): 43 ML/MIN/1.73M2
EOSINOPHIL # BLD AUTO: 0.17 K/UL (ref 0–0.5)
EOSINOPHIL NFR BLD AUTO: 4.2 % (ref 1–4)
EOSINOPHIL NFR BLD MANUAL: 4 % (ref 1–4)
ERYTHROCYTE [DISTWIDTH] IN BLOOD BY AUTOMATED COUNT: 14.1 % (ref 11.5–14.5)
GLOBULIN SER-MCNC: 3.4 G/DL (ref 2–4)
GLUCOSE SERPL-MCNC: 70 MG/DL (ref 74–106)
HCT VFR BLD AUTO: 39.6 % (ref 38–47)
HDLC SERPL-MCNC: 46 MG/DL (ref 40–60)
HGB BLD-MCNC: 12.6 G/DL (ref 12–16)
LDLC SERPL CALC-MCNC: 52 MG/DL
LDLC/HDLC SERPL: 1.1 {RATIO}
LYMPHOCYTES # BLD AUTO: 1.46 K/UL (ref 1–4.8)
LYMPHOCYTES NFR BLD AUTO: 36 % (ref 27–41)
LYMPHOCYTES NFR BLD MANUAL: 43 % (ref 27–41)
MCH RBC QN AUTO: 30.4 PG (ref 27–31)
MCHC RBC AUTO-ENTMCNC: 31.8 G/DL (ref 32–36)
MCV RBC AUTO: 95.7 FL (ref 80–96)
MONOCYTES # BLD AUTO: 0.62 K/UL (ref 0–0.8)
MONOCYTES NFR BLD AUTO: 15.3 % (ref 2–6)
MONOCYTES NFR BLD MANUAL: 12 % (ref 2–6)
MPC BLD CALC-MCNC: 9.7 FL (ref 9.4–12.4)
NEUTROPHILS # BLD AUTO: 1.78 K/UL (ref 1.8–7.7)
NEUTROPHILS NFR BLD AUTO: 43.8 % (ref 53–65)
NEUTS SEG NFR BLD MANUAL: 41 % (ref 50–62)
NONHDLC SERPL-MCNC: 73 MG/DL
NRBC BLD MANUAL-RTO: ABNORMAL %
PLATELET # BLD AUTO: 246 K/UL (ref 150–400)
PLATELET MORPHOLOGY: NORMAL
POTASSIUM SERPL-SCNC: 3.7 MMOL/L (ref 3.5–5.1)
PROT SERPL-MCNC: 6.2 G/DL (ref 6.4–8.2)
RBC # BLD AUTO: 4.14 M/UL (ref 4.2–5.4)
RBC MORPH BLD: NORMAL
SODIUM SERPL-SCNC: 141 MMOL/L (ref 136–145)
TRIGL SERPL-MCNC: 104 MG/DL (ref 35–150)
TSH SERPL DL<=0.005 MIU/L-ACNC: 2.56 UIU/ML (ref 0.36–3.74)
VLDLC SERPL-MCNC: 21 MG/DL
WBC # BLD AUTO: 4.06 K/UL (ref 4.5–11)

## 2023-11-17 PROCEDURE — 85025 COMPLETE CBC W/AUTO DIFF WBC: CPT | Performed by: FAMILY MEDICINE

## 2023-11-17 PROCEDURE — 80053 COMPREHEN METABOLIC PANEL: CPT | Performed by: FAMILY MEDICINE

## 2023-11-17 PROCEDURE — 80061 LIPID PANEL: CPT | Performed by: FAMILY MEDICINE

## 2023-11-17 PROCEDURE — 82550 ASSAY OF CK (CPK): CPT | Performed by: FAMILY MEDICINE

## 2023-11-17 PROCEDURE — 84443 ASSAY THYROID STIM HORMONE: CPT | Performed by: FAMILY MEDICINE

## 2024-02-09 DIAGNOSIS — Z71.89 COMPLEX CARE COORDINATION: ICD-10-CM

## 2024-02-20 ENCOUNTER — OFFICE VISIT (OUTPATIENT)
Dept: CARDIOLOGY | Facility: CLINIC | Age: 84
End: 2024-02-20
Payer: MEDICARE

## 2024-02-20 VITALS
HEIGHT: 63 IN | SYSTOLIC BLOOD PRESSURE: 98 MMHG | HEART RATE: 62 BPM | DIASTOLIC BLOOD PRESSURE: 62 MMHG | OXYGEN SATURATION: 99 % | WEIGHT: 100 LBS | BODY MASS INDEX: 17.72 KG/M2

## 2024-02-20 DIAGNOSIS — E78.5 HYPERLIPIDEMIA, UNSPECIFIED HYPERLIPIDEMIA TYPE: ICD-10-CM

## 2024-02-20 DIAGNOSIS — I10 HYPERTENSION, UNSPECIFIED TYPE: ICD-10-CM

## 2024-02-20 DIAGNOSIS — E03.9 HYPOTHYROIDISM, UNSPECIFIED TYPE: ICD-10-CM

## 2024-02-20 DIAGNOSIS — I25.10 CORONARY ARTERY DISEASE INVOLVING NATIVE CORONARY ARTERY OF NATIVE HEART WITHOUT ANGINA PECTORIS: ICD-10-CM

## 2024-02-20 DIAGNOSIS — I25.10 CORONARY ARTERY DISEASE, UNSPECIFIED VESSEL OR LESION TYPE, UNSPECIFIED WHETHER ANGINA PRESENT, UNSPECIFIED WHETHER NATIVE OR TRANSPLANTED HEART: Primary | ICD-10-CM

## 2024-02-20 DIAGNOSIS — I48.91 ATRIAL FIBRILLATION, UNSPECIFIED TYPE: ICD-10-CM

## 2024-02-20 DIAGNOSIS — Z79.899 HIGH RISK MEDICATION USE: ICD-10-CM

## 2024-02-20 PROBLEM — R07.89 OTHER CHEST PAIN: Status: RESOLVED | Noted: 2021-09-07 | Resolved: 2024-02-20

## 2024-02-20 PROBLEM — R60.0 LOCALIZED EDEMA: Status: RESOLVED | Noted: 2023-11-08 | Resolved: 2024-02-20

## 2024-02-20 PROBLEM — R00.2 PALPITATIONS: Status: RESOLVED | Noted: 2022-06-08 | Resolved: 2024-02-20

## 2024-02-20 PROBLEM — R06.02 SHORTNESS OF BREATH: Status: RESOLVED | Noted: 2022-11-08 | Resolved: 2024-02-20

## 2024-02-20 PROCEDURE — 99214 OFFICE O/P EST MOD 30 MIN: CPT | Mod: S$PBB,,, | Performed by: NURSE PRACTITIONER

## 2024-02-20 PROCEDURE — 99215 OFFICE O/P EST HI 40 MIN: CPT | Mod: PBBFAC,25 | Performed by: NURSE PRACTITIONER

## 2024-02-20 PROCEDURE — 93010 ELECTROCARDIOGRAM REPORT: CPT | Mod: S$PBB,,, | Performed by: INTERNAL MEDICINE

## 2024-02-20 PROCEDURE — 93005 ELECTROCARDIOGRAM TRACING: CPT | Mod: PBBFAC | Performed by: INTERNAL MEDICINE

## 2024-02-20 NOTE — ASSESSMENT & PLAN NOTE
Eliquis 2.5 mg po bid for CVA prophylaxis  Ventricular rate 125 bpm on EKG today  Nursing home reports that her HR is usually in the 80s. They check her VS daily.  CBC  CMP  TSH  Mag  UA to be obtained at the nursing home

## 2024-02-20 NOTE — ASSESSMENT & PLAN NOTE
Mild, non-obstructive CAD by [facility] 4/2/2013  asymptomatic  Continue ASA/Lipitor

## 2024-02-20 NOTE — PROGRESS NOTES
GASTROENTEROLOGY VISIT    NAME: Marcia Gutierrez    REASON FOR VISIT:  Chief Complaint   Patient presents with   • Follow-up     ED diarrhea        LAST VISIT WITH ME:  Visit date not found.    SUBJECTIVE:   Marcia Gutierrez is a 22 year old female here to discuss diarrhea.  She was seen in urgent care on 3/23 for diarrhea, fatigue, nausea, body aches that had started 3 days prior. Symptoms began after having a burger on Monday night which was not compliant with her FODMAP diet. She reports multiple episodes of watery diarrhea which were present at the time of her urgent care visit along with lower abdominal cramping. C difficile, parasite panel, stool WBC's, and bacterial pathogen panel were all negative. COVID/flu negative. She was advised that this was likely viral, to push fluids/follow brat diet, and follow up with PCP. She did take metamucil after this illness and this formed her stools up. Since that time, stools have been normal/formed. She has occasional mild cramping in the low abdomen and inquires about starting an anti-spasmodic. She lives with her boyfriend who does not always adhere to the low FODMAP diet and sometimes if she eats garlic salt or onion she will have some IBS symptoms. 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  Last colonoscopy 9/3/21, normal including negative random colon and ileal biopsies. Previous CRP/celiac/GI pathogen panel were negative.    PHYSICAL EXAM:  Vitals:    Visit Vitals  /72   Pulse (!) 105   Ht 5' 8\" (1.727 m)   Wt 67.1 kg (148 lb)   LMP 03/18/2022 Comment: Depo injection   SpO2 96%   BMI 22.50 kg/m²       GENERAL:  The patient is awake and alert and in no acute distress. Appropriate and pleasant. Nontoxic appearance.  RESPIRATORY:  Clear to auscultation bilaterally with unlabored breathing.  CARDIAC:  Regular rate and rhythm. No appreciable murmurs, rubs, or gallops. There is no  PCP: Daniel Bob DO    Referring Provider:     Subjective:   Abdias Carver is a 83 y.o. female with hx of mild, nonobstructive CAD, A fib, GERD, HLD, and HTN,  who presents for routine follow up. Patient denies complaints.    7/26/2023 presents for routine follow up. She denies complaints other than lower ext edema that resolves with elevation.         Fhx:  Family History   Problem Relation Age of Onset    Diabetes Mother     Hypertension Mother     Heart disease Mother     Cancer Mother     Heart disease Father     Hypertension Father     Parkinsonism Sister     Alcohol abuse Daughter      Shx:   Social History     Socioeconomic History    Marital status:    Tobacco Use    Smoking status: Never    Smokeless tobacco: Never   Substance and Sexual Activity    Alcohol use: Never    Drug use: Never    Sexual activity: Not Currently     Social Determinants of Health     Financial Resource Strain: Low Risk  (11/9/2022)    Overall Financial Resource Strain (CARDIA)     Difficulty of Paying Living Expenses: Not hard at all   Food Insecurity: No Food Insecurity (11/9/2022)    Hunger Vital Sign     Worried About Running Out of Food in the Last Year: Never true     Ran Out of Food in the Last Year: Never true   Transportation Needs: No Transportation Needs (11/9/2022)    PRAPARE - Transportation     Lack of Transportation (Medical): No     Lack of Transportation (Non-Medical): No   Physical Activity: Inactive (11/9/2022)    Exercise Vital Sign     Days of Exercise per Week: 0 days     Minutes of Exercise per Session: 0 min   Stress: No Stress Concern Present (11/9/2022)    [employer] [place] [employer] - Occupational Stress Questionnaire     Feeling of Stress : Not at all   Social Connections: Socially Isolated (11/9/2022)    Social Connection and Isolation Panel [NHANES]     Frequency of Communication with Friends and Family: More than three times a week     Frequency of Social Gatherings with  Friends and Family: More than three times a week     Attends Baptism Services: Never     Active Member of Clubs or Organizations: No     Attends Club or Organization Meetings: Never     Marital Status:    Housing Stability: Low Risk  (11/9/2022)    Housing Stability Vital Sign     Unable to Pay for Housing in the Last Year: No     Number of Places Lived in the Last Year: 1     Unstable Housing in the Last Year: No       EKG 2/20/2024 a fib with ventricular rate 126 bpm; can not r/o anteroseptal infarct (cited on or before 7/26/2023); when compared with EKG 7/26/2023 NSTWA improved in Inferior leads; NSTWA no longer evident in lateral leads  7/26/2023 a fib with ventricular rate 105 bpm; septal infarct  2/9/2023 afib with ventricualr rate 96 bpm; anteroseptal infarct  ECHO Results for orders placed during the hospital encounter of 11/08/22    Echo    Interpretation Summary  · Atrial fibrillation observed.  · The left ventricle is normal in size with concentric remodeling and low normal systolic function.  · The estimated ejection fraction is 50%.  · Normal right ventricular size with normal right ventricular systolic function.  · Normal left ventricular diastolic function.  · The mean diastolic gradient across the mitral valve is 3 mmHg at a heart rate of 77 bpm.  · There is mild to moderate mitral stenosis.  · Moderate left atrial enlargement.  · Moderate right atrial enlargement.  · Normal central venous pressure (3 mmHg).  · The estimated PA systolic pressure is 22 mmHg.    [facility] 4/2/2013  Mild, non-obstructive CAD  Janet LV size and systolic function  No significant MR      Lab Results   Component Value Date     11/17/2023    K 3.7 11/17/2023     11/17/2023    CO2 32 11/17/2023    BUN 22 (H) 11/17/2023    CREATININE 1.23 (H) 11/17/2023    CALCIUM 8.1 (L) 11/17/2023    ANIONGAP 8 11/17/2023    ESTGFRAFRICA 46 05/09/2021    EGFRNONAA 48 (L) 06/07/2022       Lab Results   Component Value Date     lower extremity edema.  ABDOMEN:  Soft. Non tender. Non distended. Bowel sounds are normoactive. No hepatosplenomegaly.      ASSESSMENT AND PLAN:  Ms. Gutierrez is a 22 year old female with history of Irritable Bowel Syndrome presents with a few days of diarrheal symptoms, nausea, fatigue, malaise recently after eating hamburger at local restaurant. Likely combination of viral GE with component of worsening IBS symptoms due to episodic non-adherence to FODMAP diet. She is now back on the low FODMAP and doing better, stools formed. We went over her stool studies all of which were normal and given symptom resolution discussed the nature of viral GE illnesses that are self-limiting. She is interested in potentially starting an antispasmodic which I did give her to be used as needed. I checked Lex[employer] and there is no known interaction in the database.   We discussed adherence to low FODMAP and continued efforts with stress reduction. She should see me in 6 months, sooner if any new or worsening symptoms. Patient and mother in agreement with plan.    The patient was given a summary sheet of my assessment and plan. The patient was advised to call or return with any worsening, questions, issues, or concerns.        TARIQ Payne       CHOL 119 11/17/2023    CHOL 109 05/16/2023    CHOL 105 11/12/2022     Lab Results   Component Value Date    HDL 46 11/17/2023    HDL 49 05/16/2023    HDL 45 11/12/2022     Lab Results   Component Value Date    LDLCALC 52 11/17/2023    LDLCALC 44 05/16/2023    LDLCALC 48 11/12/2022     Lab Results   Component Value Date    TRIG 104 11/17/2023    TRIG 82 05/16/2023    TRIG 60 11/12/2022     Lab Results   Component Value Date    CHOLHDL 2.6 11/17/2023    CHOLHDL 2.2 05/16/2023    CHOLHDL 2.3 11/12/2022       Lab Results   Component Value Date    WBC 4.06 (L) 11/17/2023    HGB 12.6 11/17/2023    HCT 39.6 11/17/2023    MCV 95.7 11/17/2023     11/17/2023           Current Outpatient Medications:     acetaminophen (TYLENOL) 500 MG tablet, Take 500 mg by mouth every 6 (six) hours as needed for Pain., Disp: , Rfl:     aspirin 81 MG Chew, Take 1 tablet (81 mg total) by mouth once daily., Disp: 30 tablet, Rfl: 0    atorvastatin (LIPITOR) 40 MG tablet, Take 1 tablet (40 mg total) by mouth every evening., Disp: 30 tablet, Rfl: 5    diltiaZEM (CARDIZEM) 60 MG tablet, Take 1 tablet (60 mg total) by mouth every 6 (six) hours., Disp: 120 tablet, Rfl: 11    ELIQUIS 2.5 mg Tab, Take 2.5 mg by mouth 2 (two) times daily., Disp: , Rfl:     furosemide (LASIX) 20 MG tablet, Take 0.5 tablets (10 mg total) by mouth once daily., Disp: 30 tablet, Rfl: 0    levothyroxine (SYNTHROID) 75 MCG tablet, Take 1 tablet (75 mcg total) by mouth once daily., Disp: 30 tablet, Rfl: 5    metoclopramide HCl (REGLAN) 5 MG tablet, Take 5 mg by mouth 4 (four) times daily., Disp: , Rfl:     multivitamin (ONE DAILY MULTIVITAMIN) per tablet, Take 1 tablet by mouth once daily., Disp: , Rfl:     nitroGLYCERIN (NITROSTAT) 0.4 MG SL tablet, Place 1 tablet (0.4 mg total) under the tongue every 5 (five) minutes as needed for Chest pain. Put 1 tablet under your tongue as needed for chest pain; may take up to 3 doses 5 minutes apart. If no resolutiion of CP go to  "the nearest ED for evaluation., Disp: 25 tablet, Rfl: 3    omeprazole (PRILOSEC OTC) 20 MG tablet, Take 1 tablet (20 mg total) by mouth once daily., Disp: 30 tablet, Rfl: 5    ondansetron (ZOFRAN) 4 MG tablet, Take 4 mg by mouth every 6 (six) hours as needed for Nausea., Disp: , Rfl:     polyethylene glycol (GLYCOLAX) 17 gram/dose powder, Take 17 g by mouth once daily., Disp: , Rfl:   Meds reviewed and reconciled using the list from the nursing home.       Review of Systems   Respiratory:  Negative for shortness of breath.    Cardiovascular:  Negative for chest pain, palpitations, orthopnea, claudication, leg swelling and PND.        Edema resolves with elevation    Patient denies chest pain when I questioned her.    Neurological:  Negative for dizziness, loss of consciousness and weakness.           Objective:   BP 98/62 (BP Location: Left arm, Patient Position: Sitting)   Pulse 62   Ht 5' 3" (1.6 m)   Wt 45.4 kg (100 lb)   SpO2 99%   BMI 17.71 kg/m²     Physical Exam  Vitals and nursing note reviewed.   Constitutional:       Appearance: Normal appearance. She is normal weight.      Comments: In a wheelchair   HENT:      Head: Normocephalic and atraumatic.   Neck:      Vascular: No carotid bruit.   Cardiovascular:      Rate and Rhythm: Tachycardia present. Rhythm irregular.      Pulses: Normal pulses.      Heart sounds: Normal heart sounds.   Pulmonary:      Effort: Pulmonary effort is normal.      Breath sounds: Normal breath sounds.   Abdominal:      Palpations: Abdomen is soft.   Musculoskeletal:      Cervical back: Neck supple.      Right lower leg: Edema present.      Left lower leg: Edema present.      Comments: 1+ np edema BLE   Skin:     General: Skin is warm and dry.      Capillary Refill: Capillary refill takes less than 2 seconds.   Neurological:      General: No focal deficit present.      Mental Status: She is alert.           Assessment:     1. Coronary artery disease, unspecified vessel or " lesion type, unspecified whether angina present, unspecified whether native or transplanted heart  EKG 12-lead    EKG 12-lead      2. Atrial fibrillation, unspecified type        3. Coronary artery disease involving native coronary artery of native heart without angina pectoris        4. Hyperlipidemia, unspecified hyperlipidemia type        5. Hypothyroidism, unspecified type  TSH      6. High risk medication use  Comprehensive Metabolic Panel    Magnesium    CBC Auto Differential      7. Hypertension, unspecified type              Plan:   Atrial fibrillation  Eliquis 2.5 mg po bid for CVA prophylaxis  Ventricular rate 125 bpm on EKG today  Nursing home reports that her HR is usually in the 80s. They check her VS daily.  CBC  CMP  TSH  Mag  UA to be obtained at the nursing home      Coronary artery disease involving native coronary artery of native heart  Mild, non-obstructive CAD by [facility] 4/2/2013  asymptomatic  Continue ASA/Lipitor    Hyperlipidemia  Lab Results   Component Value Date    CHOL 119 11/17/2023    CHOL 109 05/16/2023    CHOL 105 11/12/2022     Lab Results   Component Value Date    HDL 46 11/17/2023    HDL 49 05/16/2023    HDL 45 11/12/2022     Lab Results   Component Value Date    LDLCALC 52 11/17/2023    LDLCALC 44 05/16/2023    LDLCALC 48 11/12/2022     Lab Results   Component Value Date    TRIG 104 11/17/2023    TRIG 82 05/16/2023    TRIG 60 11/12/2022       Lab Results   Component Value Date    CHOLHDL 2.6 11/17/2023    CHOLHDL 2.2 05/16/2023    CHOLHDL 2.3 11/12/2022     Lipitor 40 mg po daily  Lipids followed by PCP    Hypertension  98/62 mmHg        RTC 6 months

## 2024-02-20 NOTE — ASSESSMENT & PLAN NOTE
Lab Results   Component Value Date    CHOL 119 11/17/2023    CHOL 109 05/16/2023    CHOL 105 11/12/2022     Lab Results   Component Value Date    HDL 46 11/17/2023    HDL 49 05/16/2023    HDL 45 11/12/2022     Lab Results   Component Value Date    LDLCALC 52 11/17/2023    LDLCALC 44 05/16/2023    LDLCALC 48 11/12/2022     Lab Results   Component Value Date    TRIG 104 11/17/2023    TRIG 82 05/16/2023    TRIG 60 11/12/2022       Lab Results   Component Value Date    CHOLHDL 2.6 11/17/2023    CHOLHDL 2.2 05/16/2023    CHOLHDL 2.3 11/12/2022     Lipitor 40 mg po daily  Lipids followed by PCP

## 2024-02-21 LAB
OHS QRS DURATION: 66 MS
OHS QTC CALCULATION: 466 MS

## 2024-05-09 ENCOUNTER — LAB REQUISITION (OUTPATIENT)
Dept: LAB | Facility: HOSPITAL | Age: 84
End: 2024-05-09
Attending: FAMILY MEDICINE
Payer: MEDICARE

## 2024-05-09 DIAGNOSIS — N18.30 CHRONIC KIDNEY DISEASE, STAGE 3 UNSPECIFIED: ICD-10-CM

## 2024-05-09 DIAGNOSIS — I50.30 UNSPECIFIED DIASTOLIC (CONGESTIVE) HEART FAILURE: ICD-10-CM

## 2024-05-09 DIAGNOSIS — E03.9 HYPOTHYROIDISM, UNSPECIFIED: ICD-10-CM

## 2024-05-09 DIAGNOSIS — E78.5 HYPERLIPIDEMIA, UNSPECIFIED: ICD-10-CM

## 2024-05-09 DIAGNOSIS — I48.11 LONGSTANDING PERSISTENT ATRIAL FIBRILLATION: ICD-10-CM

## 2024-05-09 LAB
ALBUMIN SERPL BCP-MCNC: 2.9 G/DL (ref 3.5–5)
ALBUMIN/GLOB SERPL: 0.9 {RATIO}
ALP SERPL-CCNC: 82 U/L (ref 55–142)
ALT SERPL W P-5'-P-CCNC: 23 U/L (ref 13–56)
ANION GAP SERPL CALCULATED.3IONS-SCNC: 15 MMOL/L (ref 7–16)
AST SERPL W P-5'-P-CCNC: 17 U/L (ref 15–37)
BASOPHILS # BLD AUTO: 0.03 K/UL (ref 0–0.2)
BASOPHILS NFR BLD AUTO: 0.7 % (ref 0–1)
BILIRUB SERPL-MCNC: 0.4 MG/DL (ref ?–1.2)
BUN SERPL-MCNC: 23 MG/DL (ref 7–18)
BUN/CREAT SERPL: 24 (ref 6–20)
CALCIUM SERPL-MCNC: 8.5 MG/DL (ref 8.5–10.1)
CHLORIDE SERPL-SCNC: 105 MMOL/L (ref 98–107)
CHOLEST SERPL-MCNC: 118 MG/DL (ref 0–200)
CHOLEST/HDLC SERPL: 2.4 {RATIO}
CK SERPL-CCNC: 30 U/L (ref 26–192)
CO2 SERPL-SCNC: 26 MMOL/L (ref 21–32)
CREAT SERPL-MCNC: 0.95 MG/DL (ref 0.55–1.02)
DIFFERENTIAL METHOD BLD: ABNORMAL
EGFR (NO RACE VARIABLE) (RUSH/TITUS): 59 ML/MIN/1.73M2
EOSINOPHIL # BLD AUTO: 0.17 K/UL (ref 0–0.5)
EOSINOPHIL NFR BLD AUTO: 3.7 % (ref 1–4)
ERYTHROCYTE [DISTWIDTH] IN BLOOD BY AUTOMATED COUNT: 15.2 % (ref 11.5–14.5)
GLOBULIN SER-MCNC: 3.1 G/DL (ref 2–4)
GLUCOSE SERPL-MCNC: 71 MG/DL (ref 74–106)
HCT VFR BLD AUTO: 42.2 % (ref 38–47)
HDLC SERPL-MCNC: 49 MG/DL (ref 40–60)
HGB BLD-MCNC: 13.2 G/DL (ref 12–16)
LDLC SERPL CALC-MCNC: 50 MG/DL
LDLC/HDLC SERPL: 1 {RATIO}
LYMPHOCYTES # BLD AUTO: 1.44 K/UL (ref 1–4.8)
LYMPHOCYTES NFR BLD AUTO: 31.3 % (ref 27–41)
MCH RBC QN AUTO: 28.7 PG (ref 27–31)
MCHC RBC AUTO-ENTMCNC: 31.3 G/DL (ref 32–36)
MCV RBC AUTO: 91.7 FL (ref 80–96)
MONOCYTES # BLD AUTO: 0.61 K/UL (ref 0–0.8)
MONOCYTES NFR BLD AUTO: 13.3 % (ref 2–6)
MPC BLD CALC-MCNC: 10 FL (ref 9.4–12.4)
NEUTROPHILS # BLD AUTO: 2.35 K/UL (ref 1.8–7.7)
NEUTROPHILS NFR BLD AUTO: 51 % (ref 53–65)
NONHDLC SERPL-MCNC: 69 MG/DL
PLATELET # BLD AUTO: 264 K/UL (ref 150–400)
POTASSIUM SERPL-SCNC: 3.8 MMOL/L (ref 3.5–5.1)
PROT SERPL-MCNC: 6 G/DL (ref 6.4–8.2)
RBC # BLD AUTO: 4.6 M/UL (ref 4.2–5.4)
SODIUM SERPL-SCNC: 142 MMOL/L (ref 136–145)
TRIGL SERPL-MCNC: 95 MG/DL (ref 35–150)
TSH SERPL DL<=0.005 MIU/L-ACNC: 3.39 UIU/ML (ref 0.36–3.74)
VLDLC SERPL-MCNC: 19 MG/DL
WBC # BLD AUTO: 4.6 K/UL (ref 4.5–11)

## 2024-05-09 PROCEDURE — 80061 LIPID PANEL: CPT | Performed by: FAMILY MEDICINE

## 2024-05-09 PROCEDURE — 84443 ASSAY THYROID STIM HORMONE: CPT | Performed by: FAMILY MEDICINE

## 2024-05-09 PROCEDURE — 85025 COMPLETE CBC W/AUTO DIFF WBC: CPT | Performed by: FAMILY MEDICINE

## 2024-05-09 PROCEDURE — 80053 COMPREHEN METABOLIC PANEL: CPT | Performed by: FAMILY MEDICINE

## 2024-05-09 PROCEDURE — 82550 ASSAY OF CK (CPK): CPT | Performed by: FAMILY MEDICINE

## 2024-07-02 ENCOUNTER — HOSPITAL ENCOUNTER (EMERGENCY)
Facility: HOSPITAL | Age: 84
Discharge: ANOTHER HOSPITAL | End: 2024-07-03
Payer: MEDICARE

## 2024-07-02 DIAGNOSIS — I48.20 CHRONIC ATRIAL FIBRILLATION: ICD-10-CM

## 2024-07-02 DIAGNOSIS — R79.89 ELEVATED TROPONIN: Primary | ICD-10-CM

## 2024-07-02 DIAGNOSIS — I25.119 CHEST PAIN DUE TO CAD: ICD-10-CM

## 2024-07-02 LAB
ALBUMIN SERPL BCP-MCNC: 2.9 G/DL (ref 3.5–5)
ALBUMIN/GLOB SERPL: 0.7 {RATIO}
ALP SERPL-CCNC: 83 U/L (ref 55–142)
ALT SERPL W P-5'-P-CCNC: 19 U/L (ref 13–56)
ANION GAP SERPL CALCULATED.3IONS-SCNC: 13 MMOL/L (ref 7–16)
AST SERPL W P-5'-P-CCNC: 16 U/L (ref 15–37)
BASOPHILS # BLD AUTO: 0.02 K/UL (ref 0–0.2)
BASOPHILS NFR BLD AUTO: 0.3 % (ref 0–1)
BILIRUB SERPL-MCNC: 0.9 MG/DL (ref ?–1.2)
BUN SERPL-MCNC: 33 MG/DL (ref 7–18)
BUN/CREAT SERPL: 32 (ref 6–20)
CALCIUM SERPL-MCNC: 8.8 MG/DL (ref 8.5–10.1)
CHLORIDE SERPL-SCNC: 102 MMOL/L (ref 98–107)
CK SERPL-CCNC: 21 U/L (ref 26–192)
CO2 SERPL-SCNC: 26 MMOL/L (ref 21–32)
CREAT SERPL-MCNC: 1.03 MG/DL (ref 0.55–1.02)
DIFFERENTIAL METHOD BLD: ABNORMAL
EGFR (NO RACE VARIABLE) (RUSH/TITUS): 53 ML/MIN/1.73M2
EOSINOPHIL # BLD AUTO: 0.04 K/UL (ref 0–0.5)
EOSINOPHIL NFR BLD AUTO: 0.5 % (ref 1–4)
ERYTHROCYTE [DISTWIDTH] IN BLOOD BY AUTOMATED COUNT: 15.9 % (ref 11.5–14.5)
GLOBULIN SER-MCNC: 4 G/DL (ref 2–4)
GLUCOSE SERPL-MCNC: 114 MG/DL (ref 74–106)
HCT VFR BLD AUTO: 40.7 % (ref 38–47)
HGB BLD-MCNC: 12.8 G/DL (ref 12–16)
LYMPHOCYTES # BLD AUTO: 1.53 K/UL (ref 1–4.8)
LYMPHOCYTES NFR BLD AUTO: 20 % (ref 27–41)
LYMPHOCYTES NFR BLD MANUAL: 25 % (ref 27–41)
MCH RBC QN AUTO: 28.1 PG (ref 27–31)
MCHC RBC AUTO-ENTMCNC: 31.4 G/DL (ref 32–36)
MCV RBC AUTO: 89.5 FL (ref 80–96)
MONOCYTES # BLD AUTO: 1.48 K/UL (ref 0–0.8)
MONOCYTES NFR BLD AUTO: 19.3 % (ref 2–6)
MONOCYTES NFR BLD MANUAL: 9 % (ref 2–6)
MPC BLD CALC-MCNC: 9.5 FL (ref 9.4–12.4)
NEUTROPHILS # BLD AUTO: 4.59 K/UL (ref 1.8–7.7)
NEUTROPHILS NFR BLD AUTO: 59.9 % (ref 53–65)
NEUTS SEG NFR BLD MANUAL: 66 % (ref 50–62)
NRBC BLD MANUAL-RTO: ABNORMAL %
PLATELET # BLD AUTO: 235 K/UL (ref 150–400)
POTASSIUM SERPL-SCNC: 4.2 MMOL/L (ref 3.5–5.1)
PROT SERPL-MCNC: 6.9 G/DL (ref 6.4–8.2)
RBC # BLD AUTO: 4.55 M/UL (ref 4.2–5.4)
SODIUM SERPL-SCNC: 137 MMOL/L (ref 136–145)
TROPONIN I SERPL DL<=0.01 NG/ML-MCNC: 90.6 PG/ML
WBC # BLD AUTO: 7.66 K/UL (ref 4.5–11)

## 2024-07-02 PROCEDURE — 36415 COLL VENOUS BLD VENIPUNCTURE: CPT | Performed by: NURSE PRACTITIONER

## 2024-07-02 PROCEDURE — 93010 ELECTROCARDIOGRAM REPORT: CPT | Mod: ,,, | Performed by: INTERNAL MEDICINE

## 2024-07-02 PROCEDURE — 99285 EMERGENCY DEPT VISIT HI MDM: CPT | Mod: 25

## 2024-07-02 PROCEDURE — 85025 COMPLETE CBC W/AUTO DIFF WBC: CPT | Performed by: NURSE PRACTITIONER

## 2024-07-02 PROCEDURE — 93005 ELECTROCARDIOGRAM TRACING: CPT

## 2024-07-02 PROCEDURE — 25000003 PHARM REV CODE 250: Performed by: NURSE PRACTITIONER

## 2024-07-02 PROCEDURE — 84484 ASSAY OF TROPONIN QUANT: CPT | Performed by: NURSE PRACTITIONER

## 2024-07-02 PROCEDURE — 82550 ASSAY OF CK (CPK): CPT | Performed by: NURSE PRACTITIONER

## 2024-07-02 PROCEDURE — 80053 COMPREHEN METABOLIC PANEL: CPT | Performed by: NURSE PRACTITIONER

## 2024-07-02 RX ORDER — ASPIRIN 325 MG
325 TABLET ORAL
Status: COMPLETED | OUTPATIENT
Start: 2024-07-02 | End: 2024-07-02

## 2024-07-02 RX ADMIN — ASPIRIN 325 MG: 325 TABLET ORAL at 11:07

## 2024-07-03 ENCOUNTER — HOSPITAL ENCOUNTER (INPATIENT)
Facility: HOSPITAL | Age: 84
LOS: 4 days | Discharge: NURSING, REHAB OR LONG-TERM CARE | DRG: 280 | End: 2024-07-07
Attending: INTERNAL MEDICINE | Admitting: INTERNAL MEDICINE
Payer: MEDICARE

## 2024-07-03 VITALS
DIASTOLIC BLOOD PRESSURE: 54 MMHG | HEIGHT: 63 IN | TEMPERATURE: 97 F | RESPIRATION RATE: 14 BRPM | HEART RATE: 88 BPM | SYSTOLIC BLOOD PRESSURE: 106 MMHG | WEIGHT: 124 LBS | BODY MASS INDEX: 21.97 KG/M2 | OXYGEN SATURATION: 94 %

## 2024-07-03 DIAGNOSIS — K56.41 FECAL IMPACTION IN RECTUM: ICD-10-CM

## 2024-07-03 DIAGNOSIS — G93.41 ENCEPHALOPATHY, METABOLIC: Primary | ICD-10-CM

## 2024-07-03 DIAGNOSIS — I21.A1 MYOCARDIAL INFARCTION DUE TO DEMAND ISCHEMIA: ICD-10-CM

## 2024-07-03 DIAGNOSIS — R07.9 CHEST PAIN: ICD-10-CM

## 2024-07-03 DIAGNOSIS — R79.89 ELEVATED TROPONIN: ICD-10-CM

## 2024-07-03 DIAGNOSIS — E86.0 DEHYDRATION: ICD-10-CM

## 2024-07-03 DIAGNOSIS — I48.91 A-FIB: ICD-10-CM

## 2024-07-03 DIAGNOSIS — I10 PRIMARY HYPERTENSION: ICD-10-CM

## 2024-07-03 DIAGNOSIS — E03.9 ACQUIRED HYPOTHYROIDISM: ICD-10-CM

## 2024-07-03 PROBLEM — F03.C0 SEVERE DEMENTIA: Status: ACTIVE | Noted: 2021-08-13

## 2024-07-03 LAB
ANION GAP SERPL CALCULATED.3IONS-SCNC: 10 MMOL/L (ref 7–16)
AORTIC ROOT ANNULUS: 2.16 CM
AORTIC VALVE CUSP SEPERATION: 1.99 CM
AV INDEX (PROSTH): 0.74
AV MEAN GRADIENT: 2 MMHG
AV PEAK GRADIENT: 3 MMHG
AV REGURGITATION PRESSURE HALF TIME: 304.83 MS
AV VALVE AREA BY VELOCITY RATIO: 2.26 CM²
AV VALVE AREA: 2.35 CM²
AV VELOCITY RATIO: 0.71
BASOPHILS # BLD AUTO: 0.03 K/UL (ref 0–0.2)
BASOPHILS NFR BLD AUTO: 0.5 % (ref 0–1)
BSA FOR ECHO PROCEDURE: 1.63 M2
BUN SERPL-MCNC: 31 MG/DL (ref 7–18)
BUN/CREAT SERPL: 35 (ref 6–20)
CALCIUM SERPL-MCNC: 8.9 MG/DL (ref 8.5–10.1)
CHLORIDE SERPL-SCNC: 104 MMOL/L (ref 98–107)
CO2 SERPL-SCNC: 28 MMOL/L (ref 21–32)
CREAT SERPL-MCNC: 0.88 MG/DL (ref 0.55–1.02)
CV ECHO LV RWT: 0.53 CM
DIFFERENTIAL METHOD BLD: ABNORMAL
DOP CALC AO PEAK VEL: 0.87 M/S
DOP CALC AO VTI: 18.2 CM
DOP CALC LVOT AREA: 3.2 CM2
DOP CALC LVOT DIAMETER: 2.01 CM
DOP CALC LVOT PEAK VEL: 0.62 M/S
DOP CALC LVOT STROKE VOLUME: 42.81 CM3
DOP CALC MV VTI: 39.4 CM
DOP CALCLVOT PEAK VEL VTI: 13.5 CM
E WAVE DECELERATION TIME: 439.83 MSEC
ECHO LV POSTERIOR WALL: 0.89 CM (ref 0.6–1.1)
EGFR (NO RACE VARIABLE) (RUSH/TITUS): 64 ML/MIN/1.73M2
EJECTION FRACTION: 50 %
EOSINOPHIL # BLD AUTO: 0.1 K/UL (ref 0–0.5)
EOSINOPHIL NFR BLD AUTO: 1.5 % (ref 1–4)
ERYTHROCYTE [DISTWIDTH] IN BLOOD BY AUTOMATED COUNT: 15.8 % (ref 11.5–14.5)
FRACTIONAL SHORTENING: 35 % (ref 28–44)
GLUCOSE SERPL-MCNC: 80 MG/DL (ref 74–106)
HCT VFR BLD AUTO: 35.8 % (ref 38–47)
HGB BLD-MCNC: 11.4 G/DL (ref 12–16)
IMM GRANULOCYTES # BLD AUTO: 0.02 K/UL (ref 0–0.04)
IMM GRANULOCYTES NFR BLD: 0.3 % (ref 0–0.4)
INTERVENTRICULAR SEPTUM: 0.96 CM (ref 0.6–1.1)
LEFT ATRIUM AREA SYSTOLIC (APICAL 2 CHAMBER): 36.65 CM2
LEFT ATRIUM AREA SYSTOLIC (APICAL 4 CHAMBER): 28.05 CM2
LEFT ATRIUM SIZE: 4.7 CM
LEFT ATRIUM VOLUME INDEX MOD: 73.7 ML/M2
LEFT ATRIUM VOLUME MOD: 119.43 CM3
LEFT INTERNAL DIMENSION IN SYSTOLE: 2.17 CM (ref 2.1–4)
LEFT VENTRICLE DIASTOLIC VOLUME INDEX: 28.55 ML/M2
LEFT VENTRICLE DIASTOLIC VOLUME: 46.25 ML
LEFT VENTRICLE END SYSTOLIC VOLUME APICAL 2 CHAMBER: 158.31 ML
LEFT VENTRICLE END SYSTOLIC VOLUME APICAL 4 CHAMBER: 87.49 ML
LEFT VENTRICLE MASS INDEX: 54 G/M2
LEFT VENTRICLE SYSTOLIC VOLUME INDEX: 9.7 ML/M2
LEFT VENTRICLE SYSTOLIC VOLUME: 15.7 ML
LEFT VENTRICULAR INTERNAL DIMENSION IN DIASTOLE: 3.36 CM (ref 3.5–6)
LEFT VENTRICULAR MASS: 86.7 G
LVED V (TEICH): 46.25 ML
LVES V (TEICH): 15.7 ML
LVOT MG: 0.67 MMHG
LVOT MV: 0.38 CM/S
LYMPHOCYTES # BLD AUTO: 1.6 K/UL (ref 1–4.8)
LYMPHOCYTES NFR BLD AUTO: 24.8 % (ref 27–41)
MCH RBC QN AUTO: 28.4 PG (ref 27–31)
MCHC RBC AUTO-ENTMCNC: 31.8 G/DL (ref 32–36)
MCV RBC AUTO: 89.1 FL (ref 80–96)
MONOCYTES # BLD AUTO: 1.38 K/UL (ref 0–0.8)
MONOCYTES NFR BLD AUTO: 21.4 % (ref 2–6)
MPC BLD CALC-MCNC: 10 FL (ref 9.4–12.4)
MV MEAN GRADIENT: 3 MMHG
MV PEAK E VEL: 1.54 M/S
MV PEAK GRADIENT: 10 MMHG
MV STENOSIS PRESSURE HALF TIME: 127.55 MS
MV VALVE AREA BY CONTINUITY EQUATION: 1.09 CM2
MV VALVE AREA P 1/2 METHOD: 1.72 CM2
NEUTROPHILS # BLD AUTO: 3.33 K/UL (ref 1.8–7.7)
NEUTROPHILS NFR BLD AUTO: 51.5 % (ref 53–65)
NRBC # BLD AUTO: 0 X10E3/UL
NRBC, AUTO (.00): 0 %
OHS QRS DURATION: 70 MS
OHS QTC CALCULATION: 455 MS
PISA AR MAX VEL: 1.75 M/S
PISA MRMAX VEL: 2.26 M/S
PISA TR MAX VEL: 2.41 M/S
PLATELET # BLD AUTO: 221 K/UL (ref 150–400)
POTASSIUM SERPL-SCNC: 3.9 MMOL/L (ref 3.5–5.1)
PV PEAK GRADIENT: 2 MMHG
PV PEAK VELOCITY: 0.78 M/S
RA VOL SYS: 19.57 ML
RBC # BLD AUTO: 4.02 M/UL (ref 4.2–5.4)
RIGHT ATRIAL AREA: 11.6 CM2
RIGHT ATRIUM VOLUME AREA LENGTH APICAL 4 CHAMBER: 19.49 ML
RIGHT VENTRICLE DIASTOLIC BASEL DIMENSION: 3.5 CM
RIGHT VENTRICLE DIASTOLIC LENGTH: 6 CM
RIGHT VENTRICLE DIASTOLIC MID DIMENSION: 1.8 CM
RIGHT VENTRICULAR LENGTH IN DIASTOLE (APICAL 4-CHAMBER VIEW): 5.98 CM
RV MID DIAMA: 1.8 CM
SODIUM SERPL-SCNC: 138 MMOL/L (ref 136–145)
TR MAX PG: 23 MMHG
TROPONIN I SERPL DL<=0.01 NG/ML-MCNC: 87.5 PG/ML
WBC # BLD AUTO: 6.46 K/UL (ref 4.5–11)
Z-SCORE OF LEFT VENTRICULAR DIMENSION IN END DIASTOLE: -3.1
Z-SCORE OF LEFT VENTRICULAR DIMENSION IN END SYSTOLE: -2.13

## 2024-07-03 PROCEDURE — 25000003 PHARM REV CODE 250: Performed by: GENERAL PRACTICE

## 2024-07-03 PROCEDURE — 99223 1ST HOSP IP/OBS HIGH 75: CPT | Mod: AI,,, | Performed by: INTERNAL MEDICINE

## 2024-07-03 PROCEDURE — 85025 COMPLETE CBC W/AUTO DIFF WBC: CPT | Performed by: GENERAL PRACTICE

## 2024-07-03 PROCEDURE — 11000001 HC ACUTE MED/SURG PRIVATE ROOM

## 2024-07-03 PROCEDURE — 36415 COLL VENOUS BLD VENIPUNCTURE: CPT | Performed by: GENERAL PRACTICE

## 2024-07-03 PROCEDURE — 80048 BASIC METABOLIC PNL TOTAL CA: CPT | Performed by: GENERAL PRACTICE

## 2024-07-03 PROCEDURE — 84484 ASSAY OF TROPONIN QUANT: CPT | Performed by: NURSE PRACTITIONER

## 2024-07-03 PROCEDURE — 36415 COLL VENOUS BLD VENIPUNCTURE: CPT | Performed by: NURSE PRACTITIONER

## 2024-07-03 RX ORDER — ACETAMINOPHEN 325 MG/1
650 TABLET ORAL EVERY 4 HOURS PRN
Status: DISCONTINUED | OUTPATIENT
Start: 2024-07-03 | End: 2024-07-07 | Stop reason: HOSPADM

## 2024-07-03 RX ORDER — POLYETHYLENE GLYCOL 3350 17 G/17G
17 POWDER, FOR SOLUTION ORAL DAILY
Status: DISCONTINUED | OUTPATIENT
Start: 2024-07-03 | End: 2024-07-07 | Stop reason: HOSPADM

## 2024-07-03 RX ORDER — TALC
6 POWDER (GRAM) TOPICAL NIGHTLY PRN
Status: DISCONTINUED | OUTPATIENT
Start: 2024-07-03 | End: 2024-07-07 | Stop reason: HOSPADM

## 2024-07-03 RX ORDER — ATORVASTATIN CALCIUM 40 MG/1
40 TABLET, FILM COATED ORAL NIGHTLY
Status: DISCONTINUED | OUTPATIENT
Start: 2024-07-03 | End: 2024-07-07 | Stop reason: HOSPADM

## 2024-07-03 RX ORDER — NITROGLYCERIN 0.4 MG/1
0.4 TABLET SUBLINGUAL EVERY 5 MIN PRN
Status: DISCONTINUED | OUTPATIENT
Start: 2024-07-03 | End: 2024-07-07 | Stop reason: HOSPADM

## 2024-07-03 RX ORDER — SIMETHICONE 80 MG
1 TABLET,CHEWABLE ORAL 4 TIMES DAILY PRN
Status: DISCONTINUED | OUTPATIENT
Start: 2024-07-03 | End: 2024-07-07 | Stop reason: HOSPADM

## 2024-07-03 RX ORDER — LEVOTHYROXINE SODIUM 75 UG/1
75 TABLET ORAL DAILY
Status: DISCONTINUED | OUTPATIENT
Start: 2024-07-03 | End: 2024-07-07 | Stop reason: HOSPADM

## 2024-07-03 RX ORDER — DILTIAZEM HYDROCHLORIDE 30 MG/1
60 TABLET, FILM COATED ORAL EVERY 6 HOURS
Status: DISCONTINUED | OUTPATIENT
Start: 2024-07-03 | End: 2024-07-07 | Stop reason: HOSPADM

## 2024-07-03 RX ORDER — ONDANSETRON HYDROCHLORIDE 2 MG/ML
4 INJECTION, SOLUTION INTRAVENOUS EVERY 8 HOURS PRN
Status: DISCONTINUED | OUTPATIENT
Start: 2024-07-03 | End: 2024-07-07 | Stop reason: HOSPADM

## 2024-07-03 RX ORDER — PANTOPRAZOLE SODIUM 40 MG/1
40 TABLET, DELAYED RELEASE ORAL DAILY
Status: DISCONTINUED | OUTPATIENT
Start: 2024-07-03 | End: 2024-07-07 | Stop reason: HOSPADM

## 2024-07-03 RX ORDER — SODIUM CHLORIDE 9 MG/ML
INJECTION, SOLUTION INTRAVENOUS CONTINUOUS
Status: DISPENSED | OUTPATIENT
Start: 2024-07-03 | End: 2024-07-04

## 2024-07-03 RX ORDER — NAPROXEN SODIUM 220 MG/1
81 TABLET, FILM COATED ORAL DAILY
Status: DISCONTINUED | OUTPATIENT
Start: 2024-07-03 | End: 2024-07-07 | Stop reason: HOSPADM

## 2024-07-03 RX ORDER — NALOXONE HCL 0.4 MG/ML
0.02 VIAL (ML) INJECTION
Status: DISCONTINUED | OUTPATIENT
Start: 2024-07-03 | End: 2024-07-07 | Stop reason: HOSPADM

## 2024-07-03 RX ORDER — SODIUM CHLORIDE 0.9 % (FLUSH) 0.9 %
10 SYRINGE (ML) INJECTION EVERY 12 HOURS PRN
Status: DISCONTINUED | OUTPATIENT
Start: 2024-07-03 | End: 2024-07-07 | Stop reason: HOSPADM

## 2024-07-03 RX ADMIN — DILTIAZEM HYDROCHLORIDE 60 MG: 30 TABLET, FILM COATED ORAL at 05:07

## 2024-07-03 RX ADMIN — APIXABAN 2.5 MG: 2.5 TABLET, FILM COATED ORAL at 08:07

## 2024-07-03 RX ADMIN — POLYETHYLENE GLYCOL 3350 17 G: 17 POWDER, FOR SOLUTION ORAL at 08:07

## 2024-07-03 RX ADMIN — Medication 6 MG: at 09:07

## 2024-07-03 RX ADMIN — ATORVASTATIN CALCIUM 40 MG: 40 TABLET, FILM COATED ORAL at 09:07

## 2024-07-03 RX ADMIN — SODIUM CHLORIDE: 9 INJECTION, SOLUTION INTRAVENOUS at 02:07

## 2024-07-03 RX ADMIN — THERA TABS 1 TABLET: TAB at 08:07

## 2024-07-03 RX ADMIN — SODIUM CHLORIDE: 9 INJECTION, SOLUTION INTRAVENOUS at 04:07

## 2024-07-03 RX ADMIN — ASPIRIN 81 MG CHEWABLE TABLET 81 MG: 81 TABLET CHEWABLE at 08:07

## 2024-07-03 RX ADMIN — SODIUM CHLORIDE: 9 INJECTION, SOLUTION INTRAVENOUS at 10:07

## 2024-07-03 RX ADMIN — LEVOTHYROXINE SODIUM 75 MCG: 75 TABLET ORAL at 08:07

## 2024-07-03 RX ADMIN — APIXABAN 2.5 MG: 2.5 TABLET, FILM COATED ORAL at 09:07

## 2024-07-03 RX ADMIN — PANTOPRAZOLE SODIUM 40 MG: 40 TABLET, DELAYED RELEASE ORAL at 08:07

## 2024-07-03 RX ADMIN — ATORVASTATIN CALCIUM 40 MG: 40 TABLET, FILM COATED ORAL at 05:07

## 2024-07-03 RX ADMIN — DILTIAZEM HYDROCHLORIDE 60 MG: 30 TABLET, FILM COATED ORAL at 12:07

## 2024-07-03 NOTE — HPI
Patient is on 82-year-old female with PMHx of hypertension, hypothyroidism, pulmonary thromboembolism, CKD 3, longstanding persistent atrial fibrillation on Eliquis, nonobstructive CAD and right-sided weakness as result of CVA in 2021 who presents to OR transferred from [facility] due irregular heat beat and elevated troponin. Patient is a resident of [facility]. Patient has history of cognitive communication deficit, she is alert but non verbal. History was gathered from chart review and nursing staff. Per nursing staff patient was verbal asking what were they doing to her before my encounter.     On admission vital signs showed /84, HR 82, RR 15, SpO2 96% on room air and afebrile. Blood work at [facility] showed H/H 12.8/40.7, WBC 7.66, , sodium 137, potassium 4.2, BUN/Cr 33/1.03, glucose 114, total protein 6.9, albumin 2.9, CPK 21, troponin 90 and 87.5. EKG showing atrial fibrillation rate controlled, no ST ischemic changes. Chest xray negative for consolidation, effusion or infiltrates. Patient will be admitted for further management.

## 2024-07-03 NOTE — PLAN OF CARE
Problem: Adult Inpatient Plan of Care  Goal: Plan of Care Review  Outcome: Progressing  Flowsheets (Taken 7/3/2024 0333)  Plan of Care Reviewed With: patient  Goal: Optimal Comfort and Wellbeing  Outcome: Progressing  Intervention: Monitor Pain and Promote Comfort  Flowsheets (Taken 7/3/2024 0333)  Pain Management Interventions:   pillow support provided   pain management plan reviewed with patient/caregiver   position adjusted   quiet environment facilitated   relaxation techniques promoted  Intervention: Provide Person-Centered Care  Flowsheets (Taken 7/3/2024 0333)  Trust Relationship/Rapport:   care explained   choices provided   emotional support provided   empathic listening provided   questions answered   questions encouraged   reassurance provided   thoughts/feelings acknowledged

## 2024-07-03 NOTE — NURSING
Sabine Lawson LPN notified of pt's order to transfer to [facility] Rm [id number] and will notify pt's next of kin.

## 2024-07-03 NOTE — ED TRIAGE NOTES
Coy Quezada at [facility] pt sent to ER for irregular heart rate.  Pt has a hx of A-Fib and was recently admitted to [facility] appx one month ago.  Family requested pt to go to [facility].  Per Nurse Hope pt vomited mucus like substance early this morning.  Pt was given zofran appx 0500 and 1100 today.      2230 Spoke with Nephew concerning pt ER admission.

## 2024-07-03 NOTE — PLAN OF CARE
[facility]  Initial Discharge Assessment       Primary Care Provider: Daniel Bob DO    Admission Diagnosis: Elevated troponin [R79.89]  A-fib [I48.91]    Admission Date: 7/3/2024  Expected Discharge Date:          Payor: MEDICARE / Plan: MEDICARE PART A & B / Product Type: Government /     Extended Emergency Contact Information  Primary Emergency Contact: Laurent Sorenson  Mobile Phone: [phone]  Relation: Healthcare Power of   Preferred language: English   needed? No  Secondary Emergency Contact: Delia Sorenson  Mobile Phone: [phone]  Relation: Relative  Preferred language: English   needed? No    Discharge Plan A: Return to nursing home  Discharge Plan B: Return to Nursing Home      [facility] "[facility] - [place], [place] - [address]  [address]  [place] [address]  Phone: [phone] Fax: [phone]    [facility] - [place], [place] - [address]  [address]  [place] [address]  Phone: [phone] Fax: [phone]      Initial Assessment (most recent)       Adult Discharge Assessment - 07/03/24 1015          Discharge Assessment    Assessment Type Discharge Planning Assessment     Source of Information family;facility verbal report     People in Home facility resident     Facility Arrived From: [facility]     Do you expect to return to your current living situation? Yes     Do you have help at home or someone to help you manage your care at home? No     Current cognitive status: Unable to Assess   Pt in bed with eyes closed; respiration regular rate and rhythm; did not respond to verbal stimuli.    Walking or Climbing Stairs Difficulty yes     Walking or Climbing Stairs ambulation difficulty, requires equipment     Dressing/Bathing Difficulty yes     Dressing/Bathing bathing difficulty, assistance 1 person;dressing difficulty, assistance 1 person     Home Accessibility wheelchair accessible     Equipment  Currently Used at Home wheelchair     Readmission within 30 days? No     Patient currently being followed by outpatient case management? No     Do you currently have service(s) that help you manage your care at home? No     Do you take prescription medications? Yes     Do you have prescription coverage? Yes     Coverage Medicare     Do you have any problems affording any of your prescribed medications? No     Is the patient taking medications as prescribed? yes     How do you get to doctors appointments? family or friend will provide;other (see comments)   facility arranges transport    Discharge Plan A Return to nursing home     Discharge Plan B Return to Nursing Home     Discharge Plan discussed with: POA     Name(s) and Number(s) Laurent Sorenson  REJI - [phone]        Physical Activity    On average, how many days per week do you engage in moderate to strenuous exercise (like a brisk walk)? 0 days     On average, how many minutes do you engage in exercise at this level? 0 min        Financial Resource Strain    How hard is it for you to pay for the very basics like food, housing, medical care, and heating? Not hard at all        Housing Stability    In the last 12 months, was there a time when you were not able to pay the mortgage or rent on time? No        Transportation Needs    Has the lack of transportation kept you from medical appointments, meetings, work or from getting things needed for daily living? No        Food Insecurity    Within the past 12 months, you worried that your food would run out before you got the money to buy more. Never true     Within the past 12 months, the food you bought just didn't last and you didn't have money to get more. Never true        Stress    Do you feel stress - tense, restless, nervous, or anxious, or unable to sleep at night because your mind is troubled all the time - these days? Patient unable to answer        Social Isolation    How often do you feel lonely or  isolated from those around you?  Patient unable to answer        Alcohol Use    Q2: How many drinks containing alcohol do you have on a typical day when you are drinking? Patient does not drink     Q3: How often do you have six or more drinks on one occasion? Never        Utilities    In the past 12 months has the electric, gas, oil, or water company threatened to shut off services in your home? No        Health Literacy    How often do you need to have someone help you when you read instructions, pamphlets, or other written material from your doctor or pharmacy? Always                      CM spoke to Armida @ [facility] and Laurent Sorenson (pt's healthcare POA). Pt in bed with eyes closed; respiration regular rate and rhythm; did not respond to verbal stimuli. Pt is a current resident @ [facility]; she uses a w/c and requires total care. She has PT,OT,and ST at the facility. SDOH completed 07/03/24. Plans for pt to nursing facility. CM will continue to follow anticipated d/c needs.

## 2024-07-03 NOTE — ASSESSMENT & PLAN NOTE
Patient non verbal with history of chronic Afib presenting with elevated troponin.  Troponin 90 and 87.5. Troponin was mid 100s one years ago.  EKG showing atrial fibrillation rate controlled, no ST ischemic changes.   Cardiac monitoring  IV NS at 125cc/h  Aspirin, high intensity statin  Monitor vital signs and physical exam.

## 2024-07-03 NOTE — ED PROVIDER NOTES
Encounter Date: 7/2/2024       History     Chief Complaint   Patient presents with    Irregular Heart Beat     Reported by nursing home staff to have irregular heart rate and nausea onset earlier today    The history is provided by the nursing home and the EMS personnel. The history is limited by the condition of the patient. No  was used.     Review of patient's allergies indicates:  No Known Allergies  Past Medical History:   Diagnosis Date    Atrial fibrillation     Coronary artery disease     CVA (cerebral vascular accident)     2021    GERD (gastroesophageal reflux disease)     Hyperlipidemia     Hypertension     Hypothyroidism     Pulmonary emboli     PVD (peripheral vascular disease)     Vitamin D deficiency      Past Surgical History:   Procedure Laterality Date    APPENDECTOMY      CARDIAC CATHETERIZATION Left 04/2013    CATARACT EXTRACTION       Family History   Problem Relation Name Age of Onset    Diabetes Mother      Hypertension Mother      Heart disease Mother      Cancer Mother      Heart disease Father      Hypertension Father      Parkinsonism Sister      Alcohol abuse Daughter       Social History     Tobacco Use    Smoking status: Never    Smokeless tobacco: Never   Substance Use Topics    Alcohol use: Never    Drug use: Never     Review of Systems   Unable to perform ROS: Dementia   Cardiovascular:         Has irregular heart rate and nausea per nursing home staff   Gastrointestinal:  Positive for nausea.   All other systems reviewed and are negative.      Physical Exam     Initial Vitals [07/02/24 2200]   BP Pulse Resp Temp SpO2   114/70 82 18 97.9 °F (36.6 °C) 95 %      MAP       --         Physical Exam    Constitutional: She appears well-developed and well-nourished.   HENT:   Head: Normocephalic and atraumatic.   Right Ear: External ear normal.   Left Ear: External ear normal.   Mouth/Throat: Oropharynx is clear and moist.   Eyes: Conjunctivae are normal. Pupils are  equal, round, and reactive to light.   Neck: Neck supple.   Normal range of motion.  Cardiovascular:  Normal rate.           Has an irregular heart rhythm, has history of chronic atrial fibrillation with presentation of atrial fibrillation   Pulmonary/Chest: Breath sounds normal. She is in respiratory distress.   Abdominal: Abdomen is soft. Bowel sounds are normal.   Musculoskeletal:         General: Normal range of motion.      Cervical back: Normal range of motion and neck supple.     Skin: Skin is warm and dry. Capillary refill takes less than 2 seconds.         Medical Screening Exam   See Full Note    ED Course   Procedures  Labs Reviewed   COMPREHENSIVE METABOLIC PANEL - Abnormal; Notable for the following components:       Result Value    Glucose 114 (*)     BUN 33 (*)     Creatinine 1.03 (*)     BUN/Creatinine Ratio 32 (*)     Albumin 2.9 (*)     eGFR 53 (*)     All other components within normal limits   TROPONIN I - Abnormal; Notable for the following components:    Troponin I High Sensitivity 90.6 (*)     All other components within normal limits   CK - Abnormal; Notable for the following components:    CK 21 (*)     All other components within normal limits   CBC WITH DIFFERENTIAL - Abnormal; Notable for the following components:    MCHC 31.4 (*)     RDW 15.9 (*)     Lymphocytes % 20.0 (*)     Monocytes % 19.3 (*)     Eosinophils % 0.5 (*)     Monocytes, Absolute 1.48 (*)     All other components within normal limits   MANUAL DIFFERENTIAL - Abnormal; Notable for the following components:    Segmented Neutrophils, Man % 66 (*)     Lymphocytes, Man % 25 (*)     Monocytes, Man % 9 (*)     All other components within normal limits   TROPONIN I - Abnormal; Notable for the following components:    Troponin I High Sensitivity 87.5 (*)     All other components within normal limits   CBC W/ AUTO DIFFERENTIAL    Narrative:     The following orders were created for panel order CBC auto differential.  Procedure                                Abnormality         Status                     ---------                               -----------         ------                     CBC with Differential[[id number]]       Abnormal            Final result               Manual Differential[[id number]]         Abnormal            Final result                 Please view results for these tests on the individual orders.        ECG Results              EKG 12-lead (In process)        Collection Time Result Time QRS Duration [facility] QTC Calculation    07/02/24 22:23:11 07/02/24 22:41:53 70 455                     In process by Interface, Lab In [facility] (07/02/24 22:42:00)                   Narrative:    Test Reason : I25.119,    Vent. Rate : 082 BPM     Atrial Rate : 108 BPM     P-R Int : 000 ms          QRS Dur : 070 ms      QT Int : 390 ms       P-R-T Axes : 000 -12 030 degrees     QTc Int : 455 ms    Atrial fibrillation  Low voltage QRS  Abnormal ECG  When compared with ECG of 20-FEB-2024 14:18,  Vent. rate has decreased BY  44 BPM  Minimal criteria for Anteroseptal infarct are no longer Present  Non-specific change in ST segment in Lateral leads  Nonspecific T wave abnormality no longer evident in Anterior leads    Referred By: [email or web address]   SELF           Confirmed By:                                   Imaging Results              X-Ray Chest 1 View (In process)                      Medications   aspirin tablet 325 mg (325 mg Oral Given 7/2/24 2326)     Medical Decision Making  Amount and/or Complexity of Data Reviewed  Labs: ordered.  Radiology: ordered.    Risk  OTC drugs.               ED Course as of 07/03/24 0136   Tue Jul 02, 2024   [id number] patient accepted for transfer to [facility] for Dr Gil, will repeat troponin prior to transfer to room 552 [BP]   Wed Jul 03, 2024   0127 Transfer center notified about need for transfer due to elevated troponin level and chronic atrial fibrillation [BP]      ED Course User Index  [BP] Praveena  DELISA Marquez                           Clinical Impression:   Final diagnoses:  [I25.119] Chest pain due to CAD  [R79.89] Elevated troponin (Primary)  [I48.20] Chronic atrial fibrillation               Jimmy Grissom NP  07/03/24 0017       Jimmy Grissom NP  07/03/24 0136       Jimmy Grissom NP  07/03/24 0151

## 2024-07-03 NOTE — ED NOTES
Rec'd call from Makeda at patient flow center.  Makeda states patient has been accepted by Dr. Gil.    Dr. Gil requested 2nd troponin be done.

## 2024-07-03 NOTE — ASSESSMENT & PLAN NOTE
Patient non verbal with history of chronic Afib presenting with elevated troponin.  Troponin 90 and 87.5. Troponin was mid 100s one year ago.  EKG showing atrial fibrillation rate controlled, no ST ischemic changes.   Cardiac monitoring  IV NS at 125cc/h  Aspirin, high intensity statin  Monitor vital signs and physical exam.

## 2024-07-03 NOTE — ASSESSMENT & PLAN NOTE
Patient with Long standing persistent (>12 months) atrial fibrillation which is controlled currently with Beta Blocker and Calcium Channel Blocker. Patient is currently in atrial fibrillation.[id number] Score: 4. HASBLED Score: 3. Anticoagulation indicated. Anticoagulation done with home Eliquis .   Other (Free Text): discontinue [facility] Detail Level: Simple Note Text (......Xxx Chief Complaint.): This diagnosis correlates with the

## 2024-07-03 NOTE — ED NOTES
[facility]

## 2024-07-03 NOTE — ED NOTES
Resting quietly in bed with eyes closed.    Arouses to verbal stimuli.    A. Fib per cardiac monitor.

## 2024-07-03 NOTE — H&P
[facility] Telemetry Hospital Medicine  History & Physical    Patient Name: Abdias Carver  MRN: [id number]  Patient Class: IP- Inpatient  Admission Date: 7/3/2024  Attending Physician: Christopher Gil MD   Primary Care Provider: Daniel Bob DO         Patient information was obtained from nursing home, past medical records, and ER records.     Subjective:     Principal Problem:Myocardial infarction due to demand ischemia    Chief Complaint:   Chief Complaint   Patient presents with    Irregular Heart Beat        HPI: Patient is on 82-year-old female with PMHx of hypertension, hypothyroidism, pulmonary thromboembolism, CKD 3, longstanding persistent atrial fibrillation on Eliquis, nonobstructive CAD and right-sided weakness as result of CVA in 2021 who presents to [facility] transferred from [facility] due irregular heat beat and elevated troponin. Patient is a resident of [facility]. Patient has history of cognitive communication deficit, she is alert but non verbal. History was gathered from chart review and nursing staff. Per nursing staff patient was verbal asking what were they doing to her before my encounter.     On admission vital signs showed /84, HR 82, RR 15, SpO2 96% on room air and afebrile. Blood work at [facility] showed H/H 12.8/40.7, WBC 7.66, , sodium 137, potassium 4.2, BUN/Cr 33/1.03, glucose 114, total protein 6.9, albumin 2.9, CPK 21, troponin 90 and 87.5. EKG showing atrial fibrillation rate controlled, no ST ischemic changes. Chest xray negative for consolidation, effusion or infiltrates. Patient will be admitted for further management.    Past Medical History:   Diagnosis Date    Atrial fibrillation     Coronary artery disease     CVA (cerebral vascular accident)     2021    GERD (gastroesophageal reflux disease)     Hyperlipidemia     Hypertension     Hypothyroidism     Pulmonary emboli     PVD (peripheral vascular disease)      Vitamin D deficiency        Past Surgical History:   Procedure Laterality Date    APPENDECTOMY      CARDIAC CATHETERIZATION Left 04/2013    CATARACT EXTRACTION         Review of patient's allergies indicates:  No Known Allergies    Current Facility-Administered Medications on File Prior to Encounter   Medication    [COMPLETED] aspirin tablet 325 mg     Current Outpatient Medications on File Prior to Encounter   Medication Sig    acetaminophen (TYLENOL) 500 MG tablet Take 500 mg by mouth every 6 (six) hours as needed for Pain.    aspirin 81 MG Chew Take 1 tablet (81 mg total) by mouth once daily.    atorvastatin (LIPITOR) 40 MG tablet Take 1 tablet (40 mg total) by mouth every evening.    diltiaZEM (CARDIZEM) 60 MG tablet Take 1 tablet (60 mg total) by mouth every 6 (six) hours.    ELIQUIS 2.5 mg Tab Take 2.5 mg by mouth 2 (two) times daily.    furosemide (LASIX) 20 MG tablet Take 0.5 tablets (10 mg total) by mouth once daily.    levothyroxine (SYNTHROID) 75 MCG tablet Take 1 tablet (75 mcg total) by mouth once daily.    metoclopramide HCl (REGLAN) 5 MG tablet Take 5 mg by mouth 4 (four) times daily.    multivitamin (ONE DAILY MULTIVITAMIN) per tablet Take 1 tablet by mouth once daily.    nitroGLYCERIN (NITROSTAT) 0.4 MG SL tablet Place 1 tablet (0.4 mg total) under the tongue every 5 (five) minutes as needed for Chest pain. Put 1 tablet under your tongue as needed for chest pain; may take up to 3 doses 5 minutes apart. If no resolutiion of CP go to the nearest ED for evaluation.    omeprazole (PRILOSEC OTC) 20 MG tablet Take 1 tablet (20 mg total) by mouth once daily.    ondansetron (ZOFRAN) 4 MG tablet Take 4 mg by mouth every 6 (six) hours as needed for Nausea.    polyethylene glycol (GLYCOLAX) 17 gram/dose powder Take 17 g by mouth once daily.     Family History       Problem Relation (Age of Onset)    Alcohol abuse Daughter    Cancer Mother    Diabetes Mother    Heart disease Mother, Father    Hypertension  Mother, Father    Parkinsonism Sister          Tobacco Use    Smoking status: Never    Smokeless tobacco: Never   Substance and Sexual Activity    Alcohol use: Never    Drug use: Never    Sexual activity: Not Currently     Review of Systems   Unable to perform ROS: Patient nonverbal     Objective:     Vital Signs (Most Recent):  Temp: 97.3 °F (36.3 °C) (07/03/24 0250)  Pulse: 82 (07/03/24 0250)  Resp: 15 (07/03/24 0250)  BP: 121/84 (07/03/24 0250)  SpO2: 96 % (07/03/24 0250) Vital Signs (24h Range):  Temp:  [97.3 °F (36.3 °C)-97.9 °F (36.6 °C)] 97.3 °F (36.3 °C)  Pulse:  [61-88] 82  Resp:  [14-19] 15  SpO2:  [94 %-100 %] 96 %  BP: ()/(54-84) 121/84     Weight: 60 kg (132 lb 3.2 oz)  Body mass index is 23.42 kg/m².     Physical Exam  Constitutional:       General: She is not in acute distress.     Appearance: She is not toxic-appearing.      Comments: Frail appearance   HENT:      Head: Normocephalic and atraumatic.      Right Ear: External ear normal.      Left Ear: External ear normal.      Nose: Nose normal.      Mouth/Throat:      Pharynx: Oropharynx is clear.   Eyes:      Extraocular Movements: Extraocular movements intact.      Conjunctiva/sclera: Conjunctivae normal.      Pupils: Pupils are equal, round, and reactive to light.   Cardiovascular:      Rate and Rhythm: Normal rate. Rhythm irregular.      Pulses: Normal pulses.      Heart sounds: Normal heart sounds.   Pulmonary:      Effort: Pulmonary effort is normal. No respiratory distress.      Breath sounds: Normal breath sounds. No wheezing, rhonchi or rales.   Abdominal:      General: Bowel sounds are normal. There is no distension.      Tenderness: There is no abdominal tenderness. There is no guarding or rebound.   Musculoskeletal:         General: Normal range of motion.      Cervical back: Normal range of motion and neck supple.      Right lower leg: No edema.      Left lower leg: No edema.   Skin:     General: Skin is warm and dry.       Findings: No rash.   Neurological:      General: No focal deficit present.      Mental Status: She is alert and oriented to person, place, and time.   Psychiatric:         Mood and Affect: Mood normal.         Behavior: Behavior normal.              CRANIAL NERVES     CN III, IV, VI   Pupils are equal, round, and reactive to light.       Significant Labs: All pertinent labs within the past 24 hours have been reviewed.    Significant Imaging: I have reviewed all pertinent imaging results/findings within the past 24 hours.    Assessment/Plan:     * Myocardial infarction due to demand ischemia  Patient non verbal with history of chronic Afib presenting with elevated troponin.  Troponin 90 and 87.5. Troponin was mid 100s one year ago.  EKG showing atrial fibrillation rate controlled, no ST ischemic changes.   Cardiac monitoring  IV NS at 125cc/h  Aspirin, high intensity statin  Monitor vital signs and physical exam.    Atrial fibrillation  Patient with Long standing persistent (>12 months) atrial fibrillation which is controlled currently with Beta Blocker and Calcium Channel Blocker. Patient is currently in atrial fibrillation.[id number] Score: 4. HASBLED Score: 3. Anticoagulation indicated. Anticoagulation done with home Eliquis .    Hypothyroidism  Continue home synthroid 75mcg QD.      Gastroesophageal reflux disease  Continue home PPI.    History of CVA (cerebrovascular accident)  History of right-sided weakness as result of CVA in 2021.  Continue home aspirin and statin.      Hyperlipidemia  Continue home statin.        VTE Risk Mitigation (From admission, onward)           Ordered     apixaban tablet 2.5 mg  2 times daily         07/03/24 0348     IP VTE LOW RISK PATIENT  Once         07/03/24 0348     Place sequential compression device  Until discontinued         07/03/24 0348                                    Leland Hopkins MD  Department of Hospital Medicine  [facility] - [facility]  Telemetry

## 2024-07-03 NOTE — SUBJECTIVE & OBJECTIVE
Past Medical History:   Diagnosis Date    Atrial fibrillation     Coronary artery disease     CVA (cerebral vascular accident)     2021    GERD (gastroesophageal reflux disease)     Hyperlipidemia     Hypertension     Hypothyroidism     Pulmonary emboli     PVD (peripheral vascular disease)     Vitamin D deficiency        Past Surgical History:   Procedure Laterality Date    APPENDECTOMY      CARDIAC CATHETERIZATION Left 04/2013    CATARACT EXTRACTION         Review of patient's allergies indicates:  No Known Allergies    Current Facility-Administered Medications on File Prior to Encounter   Medication    [COMPLETED] aspirin tablet 325 mg     Current Outpatient Medications on File Prior to Encounter   Medication Sig    acetaminophen (TYLENOL) 500 MG tablet Take 500 mg by mouth every 6 (six) hours as needed for Pain.    aspirin 81 MG Chew Take 1 tablet (81 mg total) by mouth once daily.    atorvastatin (LIPITOR) 40 MG tablet Take 1 tablet (40 mg total) by mouth every evening.    diltiaZEM (CARDIZEM) 60 MG tablet Take 1 tablet (60 mg total) by mouth every 6 (six) hours.    ELIQUIS 2.5 mg Tab Take 2.5 mg by mouth 2 (two) times daily.    furosemide (LASIX) 20 MG tablet Take 0.5 tablets (10 mg total) by mouth once daily.    levothyroxine (SYNTHROID) 75 MCG tablet Take 1 tablet (75 mcg total) by mouth once daily.    metoclopramide HCl (REGLAN) 5 MG tablet Take 5 mg by mouth 4 (four) times daily.    multivitamin (ONE DAILY MULTIVITAMIN) per tablet Take 1 tablet by mouth once daily.    nitroGLYCERIN (NITROSTAT) 0.4 MG SL tablet Place 1 tablet (0.4 mg total) under the tongue every 5 (five) minutes as needed for Chest pain. Put 1 tablet under your tongue as needed for chest pain; may take up to 3 doses 5 minutes apart. If no resolutiion of CP go to the nearest ED for evaluation.    omeprazole (PRILOSEC OTC) 20 MG tablet Take 1 tablet (20 mg total) by mouth once daily.    ondansetron (ZOFRAN) 4 MG tablet Take 4 mg by mouth  every 6 (six) hours as needed for Nausea.    polyethylene glycol (GLYCOLAX) 17 gram/dose powder Take 17 g by mouth once daily.     Family History       Problem Relation (Age of Onset)    Alcohol abuse Daughter    Cancer Mother    Diabetes Mother    Heart disease Mother, Father    Hypertension Mother, Father    Parkinsonism Sister          Tobacco Use    Smoking status: Never    Smokeless tobacco: Never   Substance and Sexual Activity    Alcohol use: Never    Drug use: Never    Sexual activity: Not Currently     Review of Systems   Unable to perform ROS: Patient nonverbal     Objective:     Vital Signs (Most Recent):  Temp: 97.3 °F (36.3 °C) (07/03/24 0250)  Pulse: 82 (07/03/24 0250)  Resp: 15 (07/03/24 0250)  BP: 121/84 (07/03/24 0250)  SpO2: 96 % (07/03/24 0250) Vital Signs (24h Range):  Temp:  [97.3 °F (36.3 °C)-97.9 °F (36.6 °C)] 97.3 °F (36.3 °C)  Pulse:  [61-88] 82  Resp:  [14-19] 15  SpO2:  [94 %-100 %] 96 %  BP: ()/(54-84) 121/84     Weight: 60 kg (132 lb 3.2 oz)  Body mass index is 23.42 kg/m².     Physical Exam  Constitutional:       General: She is not in acute distress.     Appearance: She is not toxic-appearing.      Comments: Frail appearance   HENT:      Head: Normocephalic and atraumatic.      Right Ear: External ear normal.      Left Ear: External ear normal.      Nose: Nose normal.      Mouth/Throat:      Pharynx: Oropharynx is clear.   Eyes:      Extraocular Movements: Extraocular movements intact.      Conjunctiva/sclera: Conjunctivae normal.      Pupils: Pupils are equal, round, and reactive to light.   Cardiovascular:      Rate and Rhythm: Normal rate. Rhythm irregular.      Pulses: Normal pulses.      Heart sounds: Normal heart sounds.   Pulmonary:      Effort: Pulmonary effort is normal. No respiratory distress.      Breath sounds: Normal breath sounds. No wheezing, rhonchi or rales.   Abdominal:      General: Bowel sounds are normal. There is no distension.      Tenderness: There is no  abdominal tenderness. There is no guarding or rebound.   Musculoskeletal:         General: Normal range of motion.      Cervical back: Normal range of motion and neck supple.      Right lower leg: No edema.      Left lower leg: No edema.   Skin:     General: Skin is warm and dry.      Findings: No rash.   Neurological:      General: No focal deficit present.      Mental Status: She is alert and oriented to person, place, and time.   Psychiatric:         Mood and Affect: Mood normal.         Behavior: Behavior normal.              CRANIAL NERVES     CN III, IV, VI   Pupils are equal, round, and reactive to light.       Significant Labs: All pertinent labs within the past 24 hours have been reviewed.    Significant Imaging: I have reviewed all pertinent imaging results/findings within the past 24 hours.

## 2024-07-04 PROBLEM — I26.99 PULMONARY EMBOLISM: Status: ACTIVE | Noted: 2024-07-04

## 2024-07-04 PROCEDURE — 93010 ELECTROCARDIOGRAM REPORT: CPT | Mod: 76,,, | Performed by: HOSPITALIST

## 2024-07-04 PROCEDURE — 93005 ELECTROCARDIOGRAM TRACING: CPT

## 2024-07-04 PROCEDURE — 63600175 PHARM REV CODE 636 W HCPCS: Performed by: HOSPITALIST

## 2024-07-04 PROCEDURE — 93010 ELECTROCARDIOGRAM REPORT: CPT | Mod: ,,, | Performed by: HOSPITALIST

## 2024-07-04 PROCEDURE — 11000001 HC ACUTE MED/SURG PRIVATE ROOM

## 2024-07-04 PROCEDURE — 25000003 PHARM REV CODE 250: Performed by: HOSPITALIST

## 2024-07-04 PROCEDURE — 99233 SBSQ HOSP IP/OBS HIGH 50: CPT | Mod: ,,, | Performed by: HOSPITALIST

## 2024-07-04 PROCEDURE — 25000003 PHARM REV CODE 250: Performed by: INTERNAL MEDICINE

## 2024-07-04 PROCEDURE — 25000003 PHARM REV CODE 250: Performed by: GENERAL PRACTICE

## 2024-07-04 RX ORDER — DILTIAZEM HYDROCHLORIDE 5 MG/ML
0.25 INJECTION INTRAVENOUS ONCE
Status: COMPLETED | OUTPATIENT
Start: 2024-07-04 | End: 2024-07-04

## 2024-07-04 RX ORDER — DOCUSATE SODIUM 100 MG/1
200 CAPSULE, LIQUID FILLED ORAL 2 TIMES DAILY
Status: DISCONTINUED | OUTPATIENT
Start: 2024-07-04 | End: 2024-07-07 | Stop reason: HOSPADM

## 2024-07-04 RX ORDER — DIGOXIN 0.25 MG/ML
250 INJECTION INTRAMUSCULAR; INTRAVENOUS ONCE
Status: COMPLETED | OUTPATIENT
Start: 2024-07-04 | End: 2024-07-04

## 2024-07-04 RX ORDER — DIGOXIN 0.25 MG/ML
250 INJECTION INTRAMUSCULAR; INTRAVENOUS ONCE
Status: DISCONTINUED | OUTPATIENT
Start: 2024-07-04 | End: 2024-07-07 | Stop reason: HOSPADM

## 2024-07-04 RX ADMIN — DILTIAZEM HYDROCHLORIDE 60 MG: 30 TABLET, FILM COATED ORAL at 11:07

## 2024-07-04 RX ADMIN — APIXABAN 2.5 MG: 2.5 TABLET, FILM COATED ORAL at 09:07

## 2024-07-04 RX ADMIN — THERA TABS 1 TABLET: TAB at 09:07

## 2024-07-04 RX ADMIN — PANTOPRAZOLE SODIUM 40 MG: 40 TABLET, DELAYED RELEASE ORAL at 09:07

## 2024-07-04 RX ADMIN — ATORVASTATIN CALCIUM 40 MG: 40 TABLET, FILM COATED ORAL at 08:07

## 2024-07-04 RX ADMIN — DILTIAZEM HYDROCHLORIDE 15 MG: 5 INJECTION INTRAVENOUS at 03:07

## 2024-07-04 RX ADMIN — DIGOXIN 250 MCG: 0.25 INJECTION INTRAMUSCULAR; INTRAVENOUS at 05:07

## 2024-07-04 RX ADMIN — DILTIAZEM HYDROCHLORIDE 60 MG: 30 TABLET, FILM COATED ORAL at 06:07

## 2024-07-04 RX ADMIN — LEVOTHYROXINE SODIUM 75 MCG: 75 TABLET ORAL at 09:07

## 2024-07-04 RX ADMIN — POLYETHYLENE GLYCOL 3350 17 G: 17 POWDER, FOR SOLUTION ORAL at 09:07

## 2024-07-04 RX ADMIN — DOCUSATE SODIUM 200 MG: 100 CAPSULE, LIQUID FILLED ORAL at 08:07

## 2024-07-04 RX ADMIN — APIXABAN 2.5 MG: 2.5 TABLET, FILM COATED ORAL at 08:07

## 2024-07-04 RX ADMIN — DILTIAZEM HYDROCHLORIDE 60 MG: 30 TABLET, FILM COATED ORAL at 05:07

## 2024-07-04 RX ADMIN — ASPIRIN 81 MG CHEWABLE TABLET 81 MG: 81 TABLET CHEWABLE at 09:07

## 2024-07-04 NOTE — NURSING
No rectal impaction noted, approx 1000ml enema instilled into rectum, large amount of brownish soft stool returned, pt michael well

## 2024-07-04 NOTE — PROGRESS NOTES
[facility] - [address] [place] [address] Medicine  Progress Note    Patient Name: Abdias Carver  MRN: [id number]  Patient Class: IP- Inpatient   Admission Date: 7/3/2024  Length of Stay: 0 days  Attending Physician: Parveen Wang MD  Primary Care Provider: Daniel Bob DO        Subjective:     Principal Problem:Myocardial infarction due to demand ischemia        HPI:  Patient is on 82-year-old female with PMHx of hypertension, hypothyroidism, pulmonary thromboembolism, CKD 3, longstanding persistent atrial fibrillation on Eliquis, nonobstructive CAD and right-sided weakness as result of CVA in 2021 who presents to [facility] transferred from [facility] due irregular heat beat and elevated troponin. Patient is a resident of [facility]. Patient has history of cognitive communication deficit, she is alert but non verbal. History was gathered from chart review and nursing staff. Per nursing staff patient was verbal asking what were they doing to her before my encounter.     On admission vital signs showed /84, HR 82, RR 15, SpO2 96% on room air and afebrile. Blood work at [facility] showed H/H 12.8/40.7, WBC 7.66, , sodium 137, potassium 4.2, BUN/Cr 33/1.03, glucose 114, total protein 6.9, albumin 2.9, CPK 21, troponin 90 and 87.5. EKG showing atrial fibrillation rate controlled, no ST ischemic changes. Chest xray negative for consolidation, effusion or infiltrates. Patient will be admitted for further management.    Overview/Hospital Course:  07/03 Records reviewed. Severe dementia and doesn't follow visional well or follows any commands. No family in room. Wouls not be a candidate for aggressive care. Plan discuss with family.    Interval History:     Review of Systems   Reason unable to perform ROS: pt unable to give hx and no family in room.     Objective:     Vital Signs (Most Recent):  Temp: 97.7 °F (36.5 °C) (07/03/24 1551)  Pulse: (!) 149 (07/03/24  1937)  Resp: 17 (07/03/24 1937)  BP: 106/68 (07/03/24 1937)  SpO2: (!) 94 % (07/03/24 1937) Vital Signs (24h Range):  Temp:  [97.1 °F (36.2 °C)-97.7 °F (36.5 °C)] 97.7 °F (36.5 °C)  Pulse:  [] 149  Resp:  [14-19] 17  SpO2:  [94 %-100 %] 94 %  BP: ()/(54-84) 106/68     Weight: 59.9 kg (132 lb)  Body mass index is 23.38 kg/m².    Intake/Output Summary (Last 24 hours) at 7/3/2024 9299  Last data filed at 7/3/2024 0549  Gross per 24 hour   Intake 141.74 ml   Output --   Net 141.74 ml         Physical Exam  Vitals reviewed.   Constitutional:       General: She is awake. She is not in acute distress.     Appearance: She is well-developed. She is not toxic-appearing.   HENT:      Head: Normocephalic.      Nose: Nose normal.      Mouth/Throat:      Pharynx: Oropharynx is clear.   Eyes:      Extraocular Movements: Extraocular movements intact.      Pupils: Pupils are equal, round, and reactive to light.   Neck:      Thyroid: No thyroid mass.      Vascular: No carotid bruit.   Cardiovascular:      Rate and Rhythm: Normal rate and regular rhythm.      Pulses: Normal pulses.      Heart sounds: Normal heart sounds. No murmur heard.  Pulmonary:      Effort: Pulmonary effort is normal.      Breath sounds: Normal breath sounds and air entry. No wheezing.   Abdominal:      General: Bowel sounds are normal. There is no distension.      Palpations: Abdomen is soft.      Tenderness: There is no abdominal tenderness.   Musculoskeletal:         General: Normal range of motion.      Cervical back: Neck supple. No rigidity.   Skin:     General: Skin is warm.      Coloration: Skin is not jaundiced.      Findings: No lesion.   Neurological:      General: No focal deficit present.      Mental Status: She is alert.      Cranial Nerves: No cranial nerve deficit.   Psychiatric:         Attention and Perception: Attention normal.         Behavior: Behavior is cooperative.         Cognition and Memory: Cognition normal.      Comments:  Does not follow commands or talk             Significant Labs: All pertinent labs within the past 24 hours have been reviewed.  BMP:   Recent Labs   Lab 07/03/24 0452   GLU 80      K 3.9      CO2 28   BUN 31*   CREATININE 0.88   CALCIUM 8.9     CBC:   Recent Labs   Lab 07/02/24 2248 07/03/24 0452   WBC 7.66 6.46   HGB 12.8 11.4*   HCT 40.7 35.8*    221     CMP:   Recent Labs   Lab 07/02/24 2248 07/03/24 0452    138   K 4.2 3.9    104   CO2 26 28   * 80   BUN 33* 31*   CREATININE 1.03* 0.88   CALCIUM 8.8 8.9   PROT 6.9  --    ALBUMIN 2.9*  --    BILITOT 0.9  --    ALKPHOS 83  --    AST 16  --    ALT 19  --    ANIONGAP 13 10       Significant Imaging: I have reviewed all pertinent imaging results/findings within the past 24 hours.      Intake/Output - Last 3 Shifts         07/02 0700 07/03 0659 07/03 0700 07/04 0659    I.V. (mL/kg) 141.7 (2.4)     Total Intake(mL/kg) 141.7 (2.4)     Net +141.7           Urine Occurrence 1 x     Stool Occurrence 1 x           Microbiology Results (last 7 days)       ** No results found for the last 168 hours. **              Assessment/Plan:      * Myocardial infarction due to demand ischemia  Patient non verbal with history of chronic Afib presenting with elevated troponin.  Troponin 90 and 87.5. Troponin was mid 100s one year ago.  EKG showing atrial fibrillation rate controlled, no ST ischemic changes.   Cardiac monitoring  IV NS at 125cc/h  Aspirin, high intensity statin  Monitor vital signs and physical exam.    Hypothyroidism  Continue home synthroid 75mcg QD.      Gastroesophageal reflux disease  Continue home PPI.    History of CVA (cerebrovascular accident)  History of right-sided weakness as result of CVA in 2021.  Continue home aspirin and statin.      Severe dementia  Patient with dementia with likely etiology of unknown dementia. Dementia is severe. The patient does not have signs of behavioral disturbance. Home dementia  medications are Held or Continued: continued.. Continue non-pharmacologic interventions to prevent delirium (No VS between 11PM-5AM, activity during day, opening blinds, providing glasses/hearing aids, and up in chair during daytime). Will avoid narcotics and benzos unless absolutely necessary. PRN anti-psychotics are not prescribed to avoid self harm behaviors.    07/03 get additional hx from family    Hyperlipidemia  Continue home statin.      Atrial fibrillation  Patient with Long standing persistent (>12 months) atrial fibrillation which is controlled currently with Beta Blocker and Calcium Channel Blocker. Patient is currently in atrial fibrillation.[id number] Score: 4. HASBLED Score: 3. Anticoagulation indicated. Anticoagulation done with home Eliquis .      VTE Risk Mitigation (From admission, onward)           Ordered     apixaban tablet 2.5 mg  2 times daily         07/03/24 0348     IP VTE LOW RISK PATIENT  Once         07/03/24 0348     Place sequential compression device  Until discontinued         07/03/24 0348                    Discharge Planning   NATY:      Code Status: Full Code   Is the patient medically ready for discharge?:     Reason for patient still in hospital (select all that apply): Laboratory test, Treatment, and Imaging  Discharge Plan A: Return to nursing home                  Parveen Wang MD  Department of Hospital Medicine   [facility] - [facility] Telemetry

## 2024-07-04 NOTE — SUBJECTIVE & OBJECTIVE
Interval History:     Review of Systems   Reason unable to perform ROS: pt unable to give hx and no family in room.   Constitutional:  Negative for fever.        Nonambulatory last 2 years   HENT:  Negative for trouble swallowing.    Respiratory:  Negative for cough and shortness of breath.    Cardiovascular:         Episodes of tachycardia   Gastrointestinal:  Positive for constipation. Negative for nausea and vomiting.   Neurological:  Positive for speech difficulty.   Psychiatric/Behavioral:  Positive for confusion.      Objective:     Vital Signs (Most Recent):  Temp: 97.7 °F (36.5 °C) (07/04/24 1149)  Pulse: (!) 141 (07/04/24 1149)  Resp: 17 (07/04/24 1149)  BP: (!) 111/56 (07/04/24 1149)  SpO2: (!) 92 % (07/04/24 1149) Vital Signs (24h Range):  Temp:  [97.7 °F (36.5 °C)-98.4 °F (36.9 °C)] 97.7 °F (36.5 °C)  Pulse:  [] 141  Resp:  [16-20] 17  SpO2:  [92 %-95 %] 92 %  BP: ()/(56-85) 111/56     Weight: 60 kg (132 lb 4.4 oz)  Body mass index is 23.43 kg/m².  No intake or output data in the 24 hours ending 07/04/24 1244        Physical Exam  Vitals reviewed.   Constitutional:       General: She is awake. She is not in acute distress.     Appearance: She is well-developed. She is not toxic-appearing.      Comments: Follow some with eyes today  Got her to shake her head to questions appropriately once  Otherwise not following commands   HENT:      Head: Normocephalic.      Nose: Nose normal.      Mouth/Throat:      Pharynx: Oropharynx is clear.   Eyes:      Extraocular Movements: Extraocular movements intact.      Pupils: Pupils are equal, round, and reactive to light.   Neck:      Thyroid: No thyroid mass.      Vascular: No carotid bruit.   Cardiovascular:      Rate and Rhythm: Tachycardia present. Rhythm irregular.      Pulses: Normal pulses.      Heart sounds: Normal heart sounds. No murmur heard.  Pulmonary:      Effort: Pulmonary effort is normal.      Breath sounds: Normal breath sounds and air  entry. No wheezing.   Abdominal:      General: Bowel sounds are normal. There is no distension.      Palpations: Abdomen is soft.      Tenderness: There is no abdominal tenderness.   Musculoskeletal:         General: Normal range of motion.      Cervical back: Neck supple. No rigidity.   Skin:     General: Skin is warm.      Coloration: Skin is not jaundiced.      Findings: No lesion.   Neurological:      General: No focal deficit present.      Mental Status: She is alert.      Cranial Nerves: No cranial nerve deficit.   Psychiatric:         Attention and Perception: Attention normal.         Behavior: Behavior is cooperative.         Cognition and Memory: Cognition normal.      Comments: Did not respond to me verbally and followed commands poorly             Significant Labs: All pertinent labs within the past 24 hours have been reviewed.  BMP:   Recent Labs   Lab 07/03/24  0452   GLU 80      K 3.9      CO2 28   BUN 31*   CREATININE 0.88   CALCIUM 8.9     CBC:   Recent Labs   Lab 07/02/24 2248 07/03/24  0452   WBC 7.66 6.46   HGB 12.8 11.4*   HCT 40.7 35.8*    221     CMP:   Recent Labs   Lab 07/02/24 2248 07/03/24  0452    138   K 4.2 3.9    104   CO2 26 28   * 80   BUN 33* 31*   CREATININE 1.03* 0.88   CALCIUM 8.8 8.9   PROT 6.9  --    ALBUMIN 2.9*  --    BILITOT 0.9  --    ALKPHOS 83  --    AST 16  --    ALT 19  --    ANIONGAP 13 10       Significant Imaging: I have reviewed all pertinent imaging results/findings within the past 24 hours.      Intake/Output - Last 3 Shifts         07/02 0700 07/03 0659 07/03 0700 07/04 0659 07/04 0700 07/05 0659    I.V. (mL/kg) 141.7 (2.4)      Total Intake(mL/kg) 141.7 (2.4)      Net +141.7             Urine Occurrence 1 x 2 x 1 x    Stool Occurrence 1 x 1 x 1 x          Microbiology Results (last 7 days)       ** No results found for the last 168 hours. **

## 2024-07-04 NOTE — ASSESSMENT & PLAN NOTE
Patient non verbal with history of chronic Afib presenting with elevated troponin.  Troponin 90 and 87.5. Troponin was mid 100s one year ago.  EKG showing atrial fibrillation rate controlled, no ST ischemic changes.   Cardiac monitoring  IV NS at 125cc/h  Aspirin, high intensity statin  Monitor vital signs and physical exam.    07/04  EKG 7/26/2023 a fib with ventricular rate 105 bpm; septal infarct  2/9/2023 afib with ventricualr rate 96 bpm; anteroseptal infarct  ECHO Results for orders placed during the hospital encounter of 11/08/22     Echo     Interpretation Summary  · Atrial fibrillation observed.  · The left ventricle is normal in size with concentric remodeling and low normal systolic function.  · The estimated ejection fraction is 50%.  · Normal right ventricular size with normal right ventricular systolic function.  · Normal left ventricular diastolic function.  · The mean diastolic gradient across the mitral valve is 3 mmHg at a heart rate of 77 bpm.  · There is mild to moderate mitral stenosis.  · Moderate left atrial enlargement.  · Moderate right atrial enlargement.  · Normal central venous pressure (3 mmHg).  · The estimated PA systolic pressure is 22 mmHg.       [facility] 4/2/2013  Mild, non-obstructive CAD  Janet LV size and systolic function  No significant MR      Lexiscan Cardiolite: Impression:     1. Large lateral wall fixed perfusion defect near the base consistent with scar but with no evidence of ischemia.  2. Small, fixed apical perfusion defect consistent with artifact versus scar but no evidence of ischemia.  3. Normal left ventricular size with hyperdynamic left ventricular systolic function, ejection fraction 78%.     Electronically signed by:Isauro Tena  Date:                                            06/24/2021

## 2024-07-04 NOTE — HOSPITAL COURSE
Records reviewed. Severe dementia and doesn't follow visional well or follows any commands. No family in room. Wouls not be a candidate for aggressive care. Plan discuss with family.   patient better today.  Following with eyes and nodded head wants to a question.  Was not following any other commands.  Apparently ate okay.  Enemas during the night but KUB still shows significant constipation.  Continue to work on.  Heart rate up but missed some of medicines last p.m..  Getting medication restarted.  Discussed with nephew (Laurent Sorenson) by phone.  Nephew has power of .  Patient's   in  and nephew has been helping Ms. Carver since that time.  Initially she was living at home by herself.  Had developed progressive dementia and was unable to care for self and moved into the nursing home 2 years earlier.  Nephew states she had a stroke in  and had issues with speech and memory afterwards.  He states she initially got better but then seemed to get worse with worsening dementia.  She has been seen in the past in neurology clinic.  Patient at baseline has been confused, occasionally talks and has not walked in about 2 years.  Nephew had seen patient proximally noon yesterday and felt she was at her baseline mental status then.  When I saw yesterday morning was not responding as well to me as she did today.  Patient has history of atrial fibrillation on chronic anticoagulation.  Had normal heart catheterizations in  and in  had Lexiscan Cardiolite study showed no reversible ischemia.  Chart mentions pulmonary emboli but nephew was not aware of patient ever having this and I can find nothing about it on any study or documentation other than listed on problem list.  Family has not wanted and feels patient would not want any aggressive care.  States that she has a DNR at nursing home and he would like for her to remain DNR here.  Apparently patient was taken to the hospital  because of tachycardia.  We discussed her current constipation.  He states she has had this issue before and had to have constipation treated previously.    Will plan to relieve constipation and then plan to discharge back to nursing home.  No further workup planned  07/05 Mentally better and talking some but confused. Still trying to get constipation corrected  07/06 Looks Ok. Continue to work on constipation  07/07 more bowel movements and KUB looks much better.  Tolerating diet.  Patient is very much wanting to be discharged back to nursing home.  She is able to talk and follow commands but is confused.  Can understand simple ideas.  Will discharge back to nursing home.

## 2024-07-04 NOTE — ASSESSMENT & PLAN NOTE
History of right-sided weakness as result of CVA in .  Continue home aspirin and statin.     nephew states patient had stroke in about 2018 which caused some issues with patient's speech and memory.  He has not aware of any extremity weakness.  He has not aware of any stroke in .  States after some initial improvement she became progressively less able to care for self and more confused.  Was moved into the nursing home 2 years earlier.  Her  who had been her caregiver  in  in nephew has been looking after since that time

## 2024-07-04 NOTE — ASSESSMENT & PLAN NOTE
Patient with dementia with likely etiology of unknown dementia. Dementia is severe. The patient does not have signs of behavioral disturbance. Home dementia medications are Held or Continued: continued.. Continue non-pharmacologic interventions to prevent delirium (No VS between 11PM-5AM, activity during day, opening blinds, providing glasses/hearing aids, and up in chair during daytime). Will avoid narcotics and benzos unless absolutely necessary. PRN anti-psychotics are not prescribed to avoid self harm behaviors.    07/03 get additional hx from family

## 2024-07-04 NOTE — PROGRESS NOTES
[facility] - [facility] Tele[facility] Hospital Medicine  Progress Note    Patient Name: Abdias Carver  MRN: [id number]  Patient Class: IP- Inpatient   Admission Date: 7/3/2024  Length of Stay: 1 days  Attending Physician: Parveen Wang MD  Primary Care Provider: Daniel Bob DO        Subjective:     Principal Problem:Myocardial infarction due to demand ischemia        HPI:  Patient is on 82-year-old female with PMHx of hypertension, hypothyroidism, pulmonary thromboembolism, CKD 3, longstanding persistent atrial fibrillation on Eliquis, nonobstructive CAD and right-sided weakness as result of CVA in 2021 who presents to [facility] transferred from [facility] due irregular heat beat and elevated troponin. Patient is a resident of [facility]. Patient has history of cognitive communication deficit, she is alert but non verbal. History was gathered from chart review and nursing staff. Per nursing staff patient was verbal asking what were they doing to her before my encounter.     On admission vital signs showed /84, HR 82, RR 15, SpO2 96% on room air and afebrile. Blood work at [facility] showed H/H 12.8/40.7, WBC 7.66, , sodium 137, potassium 4.2, BUN/Cr 33/1.03, glucose 114, total protein 6.9, albumin 2.9, CPK 21, troponin 90 and 87.5. EKG showing atrial fibrillation rate controlled, no ST ischemic changes. Chest xray negative for consolidation, effusion or infiltrates. Patient will be admitted for further management.    Overview/Hospital Course:  07/03 Records reviewed. Severe dementia and doesn't follow visional well or follows any commands. No family in room. Wouls not be a candidate for aggressive care. Plan discuss with family.  07/04 patient better today.  Following with eyes and nodded head wants to a question.  Was not following any other commands.  Apparently ate okay.  Enemas during the night but KUB still shows significant constipation.  Continue to  work on.  Heart rate up but missed some of medicines last p.m..  Getting medication restarted.  Discussed with nephew (Laurent Sorenson) by phone.  Nephew has power of .  Patient's   in  and nephew has been helping Ms. Carver since that time.  Initially she was living at home by herself.  Had developed progressive dementia and was unable to care for self and moved into the nursing home 2 years earlier.  Nephew states she had a stroke in 2018 and had issues with speech and memory afterwards.  He states she initially got better but then seemed to get worse with worsening dementia.  She has been seen in the past in neurology clinic.  Patient at baseline has been confused, occasionally talks and has not walked in about 2 years.  Nephew had seen patient proximally noon yesterday and felt she was at her baseline mental status then.  When I saw yesterday morning was not responding as well to me as she did today.  Patient has history of atrial fibrillation on chronic anticoagulation.  Had normal heart catheterizations in  and in  had Lexiscan Cardiolite study showed no reversible ischemia.  Chart mentions pulmonary emboli but nephew was not aware of patient ever having this and I can find nothing about it on any study or documentation other than listed on problem list.  Family has not wanted and feels patient would not want any aggressive care.  States that she has a DNR at nursing home and he would like for her to remain DNR here.  Apparently patient was taken to the hospital because of tachycardia.  We discussed her current constipation.  He states she has had this issue before and had to have constipation treated previously.    Will plan to relieve constipation and then plan to discharge back to nursing home.  No further workup planned    As discussed will change code status to DNR as per wishes by family    Interval History:     Review of Systems   Reason unable to perform ROS: pt unable  to give hx and no family in room.   Constitutional:  Negative for fever.        Nonambulatory last 2 years   HENT:  Negative for trouble swallowing.    Respiratory:  Negative for cough and shortness of breath.    Cardiovascular:         Episodes of tachycardia   Gastrointestinal:  Positive for constipation. Negative for nausea and vomiting.   Neurological:  Positive for speech difficulty.   Psychiatric/Behavioral:  Positive for confusion.      Objective:     Vital Signs (Most Recent):  Temp: 97.7 °F (36.5 °C) (07/04/24 1149)  Pulse: (!) 141 (07/04/24 1149)  Resp: 17 (07/04/24 1149)  BP: (!) 111/56 (07/04/24 1149)  SpO2: (!) 92 % (07/04/24 1149) Vital Signs (24h Range):  Temp:  [97.7 °F (36.5 °C)-98.4 °F (36.9 °C)] 97.7 °F (36.5 °C)  Pulse:  [] 141  Resp:  [16-20] 17  SpO2:  [92 %-95 %] 92 %  BP: ()/(56-85) 111/56     Weight: 60 kg (132 lb 4.4 oz)  Body mass index is 23.43 kg/m².  No intake or output data in the 24 hours ending 07/04/24 1244        Physical Exam  Vitals reviewed.   Constitutional:       General: She is awake. She is not in acute distress.     Appearance: She is well-developed. She is not toxic-appearing.      Comments: Follow some with eyes today  Got her to shake her head to questions appropriately once  Otherwise not following commands   HENT:      Head: Normocephalic.      Nose: Nose normal.      Mouth/Throat:      Pharynx: Oropharynx is clear.   Eyes:      Extraocular Movements: Extraocular movements intact.      Pupils: Pupils are equal, round, and reactive to light.   Neck:      Thyroid: No thyroid mass.      Vascular: No carotid bruit.   Cardiovascular:      Rate and Rhythm: Tachycardia present. Rhythm irregular.      Pulses: Normal pulses.      Heart sounds: Normal heart sounds. No murmur heard.  Pulmonary:      Effort: Pulmonary effort is normal.      Breath sounds: Normal breath sounds and air entry. No wheezing.   Abdominal:      General: Bowel sounds are normal. There is no  distension.      Palpations: Abdomen is soft.      Tenderness: There is no abdominal tenderness.   Musculoskeletal:         General: Normal range of motion.      Cervical back: Neck supple. No rigidity.   Skin:     General: Skin is warm.      Coloration: Skin is not jaundiced.      Findings: No lesion.   Neurological:      General: No focal deficit present.      Mental Status: She is alert.      Cranial Nerves: No cranial nerve deficit.   Psychiatric:         Attention and Perception: Attention normal.         Behavior: Behavior is cooperative.         Cognition and Memory: Cognition normal.      Comments: Did not respond to me verbally and followed commands poorly             Significant Labs: All pertinent labs within the past 24 hours have been reviewed.  BMP:   Recent Labs   Lab 07/03/24 0452   GLU 80      K 3.9      CO2 28   BUN 31*   CREATININE 0.88   CALCIUM 8.9     CBC:   Recent Labs   Lab 07/02/24 2248 07/03/24 0452   WBC 7.66 6.46   HGB 12.8 11.4*   HCT 40.7 35.8*    221     CMP:   Recent Labs   Lab 07/02/24 2248 07/03/24 0452    138   K 4.2 3.9    104   CO2 26 28   * 80   BUN 33* 31*   CREATININE 1.03* 0.88   CALCIUM 8.8 8.9   PROT 6.9  --    ALBUMIN 2.9*  --    BILITOT 0.9  --    ALKPHOS 83  --    AST 16  --    ALT 19  --    ANIONGAP 13 10       Significant Imaging: I have reviewed all pertinent imaging results/findings within the past 24 hours.      Intake/Output - Last 3 Shifts         07/02 0700 07/03 0659 07/03 0700 07/04 0659 07/04 0700 07/05 0659    I.V. (mL/kg) 141.7 (2.4)      Total Intake(mL/kg) 141.7 (2.4)      Net +141.7             Urine Occurrence 1 x 2 x 1 x    Stool Occurrence 1 x 1 x 1 x          Microbiology Results (last 7 days)       ** No results found for the last 168 hours. **              Assessment/Plan:      * Myocardial infarction due to demand ischemia  Patient non verbal with history of chronic Afib presenting with elevated  troponin.  Troponin 90 and 87.5. Troponin was mid 100s one year ago.  EKG showing atrial fibrillation rate controlled, no ST ischemic changes.   Cardiac monitoring  IV NS at 125cc/h  Aspirin, high intensity statin  Monitor vital signs and physical exam.    07/04  EKG 7/26/2023 a fib with ventricular rate 105 bpm; septal infarct  2/9/2023 afib with ventricualr rate 96 bpm; anteroseptal infarct  ECHO Results for orders placed during the hospital encounter of 11/08/22     Echo     Interpretation Summary  · Atrial fibrillation observed.  · The left ventricle is normal in size with concentric remodeling and low normal systolic function.  · The estimated ejection fraction is 50%.  · Normal right ventricular size with normal right ventricular systolic function.  · Normal left ventricular diastolic function.  · The mean diastolic gradient across the mitral valve is 3 mmHg at a heart rate of 77 bpm.  · There is mild to moderate mitral stenosis.  · Moderate left atrial enlargement.  · Moderate right atrial enlargement.  · Normal central venous pressure (3 mmHg).  · The estimated PA systolic pressure is 22 mmHg.       [facility] 4/2/2013  Mild, non-obstructive CAD  Janet LV size and systolic function  No significant MR      Lexiscan Cardiolite: Impression:     1. Large lateral wall fixed perfusion defect near the base consistent with scar but with no evidence of ischemia.  2. Small, fixed apical perfusion defect consistent with artifact versus scar but no evidence of ischemia.  3. Normal left ventricular size with hyperdynamic left ventricular systolic function, ejection fraction 78%.     Electronically signed by:Isauro Tena  Date:                                            06/24/2021          Pulmonary embolism    Listed on problem list but see no studies or other specific details of a pulmonary embolism or deep venous thrombosis  Nephew was not aware of her having any previous thrombosis  She has been on Eliquis related to  atrial fibrillation    Fecal impaction in rectum     Receiving enemas but has still some persistent constipation  If not relieved by enemas will consider Gastrografin enema when available    Hypothyroidism  Continue home synthroid 75mcg QD.      Gastroesophageal reflux disease  Continue home PPI.    History of CVA (cerebrovascular accident)  History of right-sided weakness as result of CVA in .  Continue home aspirin and statin.     nephew states patient had stroke in about 2018 which caused some issues with patient's speech and memory.  He has not aware of any extremity weakness.  He has not aware of any stroke in .  States after some initial improvement she became progressively less able to care for self and more confused.  Was moved into the nursing home 2 years earlier.  Her  who had been her caregiver  in  in nephew has been looking after since that time      Severe dementia  Patient with dementia with likely etiology of unknown dementia. Dementia is severe. The patient does not have signs of behavioral disturbance. Home dementia medications are Held or Continued: continued.. Continue non-pharmacologic interventions to prevent delirium (No VS between 11PM-5AM, activity during day, opening blinds, providing glasses/hearing aids, and up in chair during daytime). Will avoid narcotics and benzos unless absolutely necessary. PRN anti-psychotics are not prescribed to avoid self harm behaviors.     get additional hx from family   patient at baseline is confused, talk some, not able to do any for activities of daily living and has not ambulated in 2 years  She has a DNR at nursing home in only would like to continue DNR and no aggressive care  Patient has history of stroke in 2018 affecting memory and speech.  Nephew states she improved for a period of time afterwards but then developed progressive dementia and was not able to care for self at home.  Moved into nursing home 2  years earlier    Coronary artery disease involving native coronary artery of native heart  See results of studies above but with previous evaluation has normal coronary arteries.     Hyperlipidemia  Continue home statin.      Atrial fibrillation  Patient with Long standing persistent (>12 months) atrial fibrillation which is controlled currently with Beta Blocker and Calcium Channel Blocker. Patient is currently in atrial fibrillation.[id number] Score: 4. HASBLED Score: 3. Anticoagulation indicated. Anticoagulation done with home Eliquis .    07/04 chronic atrial fibrillation with increased rate today but had been off medicines.  Also likely somewhat volume contracted.  Will hydrate restart oral medicines and watch heart rate.  She takes Eliquis also and will continue for now.  No history of any bleeding      VTE Risk Mitigation (From admission, onward)           Ordered     apixaban tablet 2.5 mg  2 times daily         07/03/24 0348     IP VTE LOW RISK PATIENT  Once         07/03/24 0348     Place sequential compression device  Until discontinued         07/03/24 0348                    Discharge Planning   NATY:      Code Status: Full Code   Is the patient medically ready for discharge?:     Reason for patient still in hospital (select all that apply): Treatment and Pending disposition  Discharge Plan A: Return to nursing home                  Parveen Wang MD  Department of Hospital Medicine   [facility] - [facility] Tele[facility]

## 2024-07-04 NOTE — SUBJECTIVE & OBJECTIVE
Interval History:     Review of Systems   Reason unable to perform ROS: pt unable to give hx and no family in room.     Objective:     Vital Signs (Most Recent):  Temp: 97.7 °F (36.5 °C) (07/03/24 1551)  Pulse: (!) 149 (07/03/24 1937)  Resp: 17 (07/03/24 1937)  BP: 106/68 (07/03/24 1937)  SpO2: (!) 94 % (07/03/24 1937) Vital Signs (24h Range):  Temp:  [97.1 °F (36.2 °C)-97.7 °F (36.5 °C)] 97.7 °F (36.5 °C)  Pulse:  [] 149  Resp:  [14-19] 17  SpO2:  [94 %-100 %] 94 %  BP: ()/(54-84) 106/68     Weight: 59.9 kg (132 lb)  Body mass index is 23.38 kg/m².    Intake/Output Summary (Last 24 hours) at 7/3/2024 5837  Last data filed at 7/3/2024 0549  Gross per 24 hour   Intake 141.74 ml   Output --   Net 141.74 ml         Physical Exam  Vitals reviewed.   Constitutional:       General: She is awake. She is not in acute distress.     Appearance: She is well-developed. She is not toxic-appearing.   HENT:      Head: Normocephalic.      Nose: Nose normal.      Mouth/Throat:      Pharynx: Oropharynx is clear.   Eyes:      Extraocular Movements: Extraocular movements intact.      Pupils: Pupils are equal, round, and reactive to light.   Neck:      Thyroid: No thyroid mass.      Vascular: No carotid bruit.   Cardiovascular:      Rate and Rhythm: Normal rate and regular rhythm.      Pulses: Normal pulses.      Heart sounds: Normal heart sounds. No murmur heard.  Pulmonary:      Effort: Pulmonary effort is normal.      Breath sounds: Normal breath sounds and air entry. No wheezing.   Abdominal:      General: Bowel sounds are normal. There is no distension.      Palpations: Abdomen is soft.      Tenderness: There is no abdominal tenderness.   Musculoskeletal:         General: Normal range of motion.      Cervical back: Neck supple. No rigidity.   Skin:     General: Skin is warm.      Coloration: Skin is not jaundiced.      Findings: No lesion.   Neurological:      General: No focal deficit present.      Mental Status: She  is alert.      Cranial Nerves: No cranial nerve deficit.   Psychiatric:         Attention and Perception: Attention normal.         Behavior: Behavior is cooperative.         Cognition and Memory: Cognition normal.      Comments: Does not follow commands or talk             Significant Labs: All pertinent labs within the past 24 hours have been reviewed.  BMP:   Recent Labs   Lab 07/03/24 0452   GLU 80      K 3.9      CO2 28   BUN 31*   CREATININE 0.88   CALCIUM 8.9     CBC:   Recent Labs   Lab 07/02/24 2248 07/03/24 0452   WBC 7.66 6.46   HGB 12.8 11.4*   HCT 40.7 35.8*    221     CMP:   Recent Labs   Lab 07/02/24 2248 07/03/24 0452    138   K 4.2 3.9    104   CO2 26 28   * 80   BUN 33* 31*   CREATININE 1.03* 0.88   CALCIUM 8.8 8.9   PROT 6.9  --    ALBUMIN 2.9*  --    BILITOT 0.9  --    ALKPHOS 83  --    AST 16  --    ALT 19  --    ANIONGAP 13 10       Significant Imaging: I have reviewed all pertinent imaging results/findings within the past 24 hours.      Intake/Output - Last 3 Shifts         07/02 0700  07/03 0659 07/03 0700  07/04 0659    I.V. (mL/kg) 141.7 (2.4)     Total Intake(mL/kg) 141.7 (2.4)     Net +141.7           Urine Occurrence 1 x     Stool Occurrence 1 x           Microbiology Results (last 7 days)       ** No results found for the last 168 hours. **

## 2024-07-04 NOTE — ASSESSMENT & PLAN NOTE
Listed on problem list but see no studies or other specific details of a pulmonary embolism or deep venous thrombosis  Nephew was not aware of her having any previous thrombosis  She has been on Eliquis related to atrial fibrillation

## 2024-07-04 NOTE — NURSING
Patient's heart rate up.  Rate 135-166.  Sitting up in bed alert. Resp even and unlabored.  EKG obtained and Dr. Wang notified.      Will give 1200 does of Cardizem po now.  Will continue to monitor.

## 2024-07-04 NOTE — ASSESSMENT & PLAN NOTE
Patient with dementia with likely etiology of unknown dementia. Dementia is severe. The patient does not have signs of behavioral disturbance. Home dementia medications are Held or Continued: continued.. Continue non-pharmacologic interventions to prevent delirium (No VS between 11PM-5AM, activity during day, opening blinds, providing glasses/hearing aids, and up in chair during daytime). Will avoid narcotics and benzos unless absolutely necessary. PRN anti-psychotics are not prescribed to avoid self harm behaviors.    07/03 get additional hx from family  07/04 patient at baseline is confused, talk some, not able to do any for activities of daily living and has not ambulated in 2 years  She has a DNR at nursing home in only would like to continue DNR and no aggressive care  Patient has history of stroke in 2018 affecting memory and speech.  Nephew states she improved for a period of time afterwards but then developed progressive dementia and was not able to care for self at home.  Moved into nursing home 2 years earlier

## 2024-07-04 NOTE — ASSESSMENT & PLAN NOTE
Patient with Long standing persistent (>12 months) atrial fibrillation which is controlled currently with Beta Blocker and Calcium Channel Blocker. Patient is currently in atrial fibrillation.[id number] Score: 4. HASBLED Score: 3. Anticoagulation indicated. Anticoagulation done with home Eliquis .    07/04 chronic atrial fibrillation with increased rate today but had been off medicines.  Also likely somewhat volume contracted.  Will hydrate restart oral medicines and watch heart rate.  She takes Eliquis also and will continue for now.  No history of any bleeding

## 2024-07-04 NOTE — ASSESSMENT & PLAN NOTE
07/04 Receiving enemas but has still some persistent constipation  If not relieved by enemas will consider Gastrografin enema when available

## 2024-07-04 NOTE — NURSING
Monitor room reports increased heart rate, once in room, pt monitor showing heart rate 150, EKG obtained and faxed to Dr. Gil at #[id number], vitals stable, new order received    3775-Cardizem 15mg ivp given per PARKER Boyce    0400-pulse 97

## 2024-07-05 PROCEDURE — 25000003 PHARM REV CODE 250: Performed by: GENERAL PRACTICE

## 2024-07-05 PROCEDURE — 11000001 HC ACUTE MED/SURG PRIVATE ROOM

## 2024-07-05 PROCEDURE — 25000003 PHARM REV CODE 250: Performed by: HOSPITALIST

## 2024-07-05 PROCEDURE — 25500020 PHARM REV CODE 255: Performed by: HOSPITALIST

## 2024-07-05 PROCEDURE — 99233 SBSQ HOSP IP/OBS HIGH 50: CPT | Mod: ,,, | Performed by: HOSPITALIST

## 2024-07-05 RX ORDER — SYRING-NEEDL,DISP,INSUL,0.3 ML 29 G X1/2"
296 SYRINGE, EMPTY DISPOSABLE MISCELLANEOUS ONCE
Status: COMPLETED | OUTPATIENT
Start: 2024-07-05 | End: 2024-07-05

## 2024-07-05 RX ORDER — BISACODYL 10 MG/1
10 SUPPOSITORY RECTAL ONCE
Status: COMPLETED | OUTPATIENT
Start: 2024-07-05 | End: 2024-07-05

## 2024-07-05 RX ADMIN — APIXABAN 2.5 MG: 2.5 TABLET, FILM COATED ORAL at 10:07

## 2024-07-05 RX ADMIN — Medication 6 MG: at 10:07

## 2024-07-05 RX ADMIN — MAGNESIUM CITRATE 296 ML: 1.75 LIQUID ORAL at 02:07

## 2024-07-05 RX ADMIN — BISACODYL 10 MG: 10 SUPPOSITORY RECTAL at 10:07

## 2024-07-05 RX ADMIN — DIATRIZOATE MEGLUMINE AND DIATRIZOATE SODIUM 960 ML: 600; 100 SOLUTION ORAL; RECTAL at 09:07

## 2024-07-05 RX ADMIN — DILTIAZEM HYDROCHLORIDE 60 MG: 30 TABLET, FILM COATED ORAL at 12:07

## 2024-07-05 RX ADMIN — DOCUSATE SODIUM 200 MG: 100 CAPSULE, LIQUID FILLED ORAL at 08:07

## 2024-07-05 RX ADMIN — APIXABAN 2.5 MG: 2.5 TABLET, FILM COATED ORAL at 08:07

## 2024-07-05 RX ADMIN — DOCUSATE SODIUM 200 MG: 100 CAPSULE, LIQUID FILLED ORAL at 10:07

## 2024-07-05 RX ADMIN — THERA TABS 1 TABLET: TAB at 08:07

## 2024-07-05 RX ADMIN — PANTOPRAZOLE SODIUM 40 MG: 40 TABLET, DELAYED RELEASE ORAL at 08:07

## 2024-07-05 RX ADMIN — ASPIRIN 81 MG CHEWABLE TABLET 81 MG: 81 TABLET CHEWABLE at 08:07

## 2024-07-05 RX ADMIN — ATORVASTATIN CALCIUM 40 MG: 40 TABLET, FILM COATED ORAL at 10:07

## 2024-07-05 RX ADMIN — POLYETHYLENE GLYCOL 3350 17 G: 17 POWDER, FOR SOLUTION ORAL at 08:07

## 2024-07-05 RX ADMIN — DILTIAZEM HYDROCHLORIDE 60 MG: 30 TABLET, FILM COATED ORAL at 06:07

## 2024-07-05 RX ADMIN — LEVOTHYROXINE SODIUM 75 MCG: 75 TABLET ORAL at 08:07

## 2024-07-05 NOTE — PLAN OF CARE
Pt will likely be ready for DC tomorrow. Called [facility] and they do have a CUT OFF TIME OF 1400. Pt is ok to DC to facility 7/6 or 7/7 if cleared by MD. Following DC needs as arise.

## 2024-07-06 LAB
BASOPHILS # BLD AUTO: 0.05 K/UL (ref 0–0.2)
BASOPHILS NFR BLD AUTO: 0.8 % (ref 0–1)
DIFFERENTIAL METHOD BLD: ABNORMAL
EOSINOPHIL # BLD AUTO: 0.15 K/UL (ref 0–0.5)
EOSINOPHIL NFR BLD AUTO: 2.4 % (ref 1–4)
ERYTHROCYTE [DISTWIDTH] IN BLOOD BY AUTOMATED COUNT: 15.3 % (ref 11.5–14.5)
HCT VFR BLD AUTO: 40.3 % (ref 38–47)
HGB BLD-MCNC: 12.6 G/DL (ref 12–16)
IMM GRANULOCYTES # BLD AUTO: 0.02 K/UL (ref 0–0.04)
IMM GRANULOCYTES NFR BLD: 0.3 % (ref 0–0.4)
LYMPHOCYTES # BLD AUTO: 1.96 K/UL (ref 1–4.8)
LYMPHOCYTES NFR BLD AUTO: 31.2 % (ref 27–41)
MCH RBC QN AUTO: 27.7 PG (ref 27–31)
MCHC RBC AUTO-ENTMCNC: 31.3 G/DL (ref 32–36)
MCV RBC AUTO: 88.6 FL (ref 80–96)
MONOCYTES # BLD AUTO: 0.95 K/UL (ref 0–0.8)
MONOCYTES NFR BLD AUTO: 15.1 % (ref 2–6)
MPC BLD CALC-MCNC: 9.6 FL (ref 9.4–12.4)
NEUTROPHILS # BLD AUTO: 3.16 K/UL (ref 1.8–7.7)
NEUTROPHILS NFR BLD AUTO: 50.2 % (ref 53–65)
NRBC # BLD AUTO: 0 X10E3/UL
NRBC, AUTO (.00): 0 %
PLATELET # BLD AUTO: 251 K/UL (ref 150–400)
RBC # BLD AUTO: 4.55 M/UL (ref 4.2–5.4)
WBC # BLD AUTO: 6.29 K/UL (ref 4.5–11)

## 2024-07-06 PROCEDURE — 11000001 HC ACUTE MED/SURG PRIVATE ROOM

## 2024-07-06 PROCEDURE — 25000003 PHARM REV CODE 250: Performed by: GENERAL PRACTICE

## 2024-07-06 PROCEDURE — 36415 COLL VENOUS BLD VENIPUNCTURE: CPT | Performed by: HOSPITALIST

## 2024-07-06 PROCEDURE — 25000003 PHARM REV CODE 250: Performed by: HOSPITALIST

## 2024-07-06 PROCEDURE — 85025 COMPLETE CBC W/AUTO DIFF WBC: CPT | Performed by: HOSPITALIST

## 2024-07-06 PROCEDURE — 99232 SBSQ HOSP IP/OBS MODERATE 35: CPT | Mod: ,,, | Performed by: HOSPITALIST

## 2024-07-06 RX ORDER — SYRING-NEEDL,DISP,INSUL,0.3 ML 29 G X1/2"
296 SYRINGE, EMPTY DISPOSABLE MISCELLANEOUS ONCE
Status: COMPLETED | OUTPATIENT
Start: 2024-07-06 | End: 2024-07-06

## 2024-07-06 RX ADMIN — APIXABAN 2.5 MG: 2.5 TABLET, FILM COATED ORAL at 08:07

## 2024-07-06 RX ADMIN — THERA TABS 1 TABLET: TAB at 08:07

## 2024-07-06 RX ADMIN — ACETAMINOPHEN 650 MG: 325 TABLET ORAL at 08:07

## 2024-07-06 RX ADMIN — ATORVASTATIN CALCIUM 40 MG: 40 TABLET, FILM COATED ORAL at 09:07

## 2024-07-06 RX ADMIN — DOCUSATE SODIUM 200 MG: 100 CAPSULE, LIQUID FILLED ORAL at 09:07

## 2024-07-06 RX ADMIN — DILTIAZEM HYDROCHLORIDE 60 MG: 30 TABLET, FILM COATED ORAL at 05:07

## 2024-07-06 RX ADMIN — MAGNESIUM CITRATE 296 ML: 1.75 LIQUID ORAL at 09:07

## 2024-07-06 RX ADMIN — LEVOTHYROXINE SODIUM 75 MCG: 75 TABLET ORAL at 08:07

## 2024-07-06 RX ADMIN — POLYETHYLENE GLYCOL 3350 17 G: 17 POWDER, FOR SOLUTION ORAL at 08:07

## 2024-07-06 RX ADMIN — ASPIRIN 81 MG CHEWABLE TABLET 81 MG: 81 TABLET CHEWABLE at 08:07

## 2024-07-06 RX ADMIN — PANTOPRAZOLE SODIUM 40 MG: 40 TABLET, DELAYED RELEASE ORAL at 08:07

## 2024-07-06 RX ADMIN — APIXABAN 2.5 MG: 2.5 TABLET, FILM COATED ORAL at 09:07

## 2024-07-06 RX ADMIN — DOCUSATE SODIUM 200 MG: 100 CAPSULE, LIQUID FILLED ORAL at 08:07

## 2024-07-06 RX ADMIN — DILTIAZEM HYDROCHLORIDE 60 MG: 30 TABLET, FILM COATED ORAL at 12:07

## 2024-07-06 NOTE — ASSESSMENT & PLAN NOTE
Patient with dementia with likely etiology of unknown dementia. Dementia is severe. The patient does not have signs of behavioral disturbance. Home dementia medications are Held or Continued: continued.. Continue non-pharmacologic interventions to prevent delirium (No VS between 11PM-5AM, activity during day, opening blinds, providing glasses/hearing aids, and up in chair during daytime). Will avoid narcotics and benzos unless absolutely necessary. PRN anti-psychotics are not prescribed to avoid self harm behaviors.    07/03 get additional hx from family  07/04 patient at baseline is confused, talk some, not able to do any for activities of daily living and has not ambulated in 2 years  She has a DNR at nursing home in only would like to continue DNR and no aggressive care  Patient has history of stroke in 2018 affecting memory and speech.  Nephew states she improved for a period of time afterwards but then developed progressive dementia and was not able to care for self at home.  Moved into nursing home 2 years earlier  07/05 Better talking some but confused

## 2024-07-06 NOTE — SUBJECTIVE & OBJECTIVE
Interval History:     Review of Systems   Reason unable to perform ROS: pt unable to give hx and no family in room.   Constitutional:  Negative for fever.        Nonambulatory last 2 years   HENT:  Negative for trouble swallowing.    Respiratory:  Negative for cough and shortness of breath.    Cardiovascular:         Episodes of tachycardia   Gastrointestinal:  Positive for constipation. Negative for nausea and vomiting.   Neurological:  Positive for speech difficulty.   Psychiatric/Behavioral:  Positive for confusion.      Objective:     Vital Signs (Most Recent):  Temp: 97.2 °F (36.2 °C) (07/05/24 1930)  Pulse: 93 (07/05/24 1930)  Resp: 17 (07/05/24 1930)  BP: 125/83 (07/05/24 1930)  SpO2: (!) 94 % (07/05/24 1930) Vital Signs (24h Range):  Temp:  [97.2 °F (36.2 °C)-97.9 °F (36.6 °C)] 97.2 °F (36.2 °C)  Pulse:  [80-94] 93  Resp:  [16-18] 17  SpO2:  [91 %-96 %] 94 %  BP: (103-125)/(60-83) 125/83     Weight: 60 kg (132 lb 4.4 oz)  Body mass index is 23.43 kg/m².  No intake or output data in the 24 hours ending 07/05/24 2136        Physical Exam  Vitals reviewed.   Constitutional:       General: She is awake. She is not in acute distress.     Appearance: She is well-developed. She is not toxic-appearing.      Comments: Follow some with eyes today  Got her to shake her head to questions appropriately once  Otherwise not following commands   HENT:      Head: Normocephalic.      Nose: Nose normal.      Mouth/Throat:      Pharynx: Oropharynx is clear.   Eyes:      Extraocular Movements: Extraocular movements intact.      Pupils: Pupils are equal, round, and reactive to light.   Neck:      Thyroid: No thyroid mass.      Vascular: No carotid bruit.   Cardiovascular:      Rate and Rhythm: Tachycardia present. Rhythm irregular.      Pulses: Normal pulses.      Heart sounds: Normal heart sounds. No murmur heard.  Pulmonary:      Effort: Pulmonary effort is normal.      Breath sounds: Normal breath sounds and air entry. No  "wheezing.   Abdominal:      General: Bowel sounds are normal. There is no distension.      Palpations: Abdomen is soft.      Tenderness: There is no abdominal tenderness.   Musculoskeletal:         General: Normal range of motion.      Cervical back: Neck supple. No rigidity.   Skin:     General: Skin is warm.      Coloration: Skin is not jaundiced.      Findings: No lesion.   Neurological:      General: No focal deficit present.      Mental Status: She is alert.      Cranial Nerves: No cranial nerve deficit.   Psychiatric:         Attention and Perception: Attention normal.         Behavior: Behavior is cooperative.         Cognition and Memory: Cognition normal.      Comments: Did not respond to me verbally and followed commands poorly             Significant Labs: All pertinent labs within the past 24 hours have been reviewed.  BMP:   No results for input(s): "GLU", "NA", "K", "CL", "CO2", "BUN", "CREATININE", "CALCIUM", "MG" in the last 48 hours.    CBC:   No results for input(s): "WBC", "HGB", "HCT", "PLT" in the last 48 hours.    CMP:   No results for input(s): "NA", "K", "CL", "CO2", "GLU", "BUN", "CREATININE", "CALCIUM", "PROT", "ALBUMIN", "BILITOT", "ALKPHOS", "AST", "ALT", "ANIONGAP", "EGFRNONAA" in the last 48 hours.    Invalid input(s): "ESTGFAFRICA"      Significant Imaging: I have reviewed all pertinent imaging results/findings within the past 24 hours.      Intake/Output - Last 3 Shifts         07/04 0700 07/05 0659 07/05 0700  07/06 0659          Urine Occurrence 3 x 1 x    Stool Occurrence 3 x 1 x          Microbiology Results (last 7 days)       ** No results found for the last 168 hours. **            "

## 2024-07-06 NOTE — ASSESSMENT & PLAN NOTE
07/04 Receiving enemas but has still some persistent constipation  If not relieved by enemas will consider Gastrografin enema when available  07/05 Unable to do gastrografin enema secondary rectal impaction. Apparently impaction high and can't get digitally. Given MgCitrate. Consider Golytely prep but would have to get NGT to do. Recheck in am. Has passed several watery stools.

## 2024-07-06 NOTE — NURSING
1800:  Pt has removed heart leads again and refuses to allow them to be placed back on her at this time. Monitor room aware.

## 2024-07-06 NOTE — PROGRESS NOTES
[facility] - [facility] Tele[facility] Hospital Medicine  Progress Note    Patient Name: Abdias Carver  MRN: [id number]  Patient Class: IP- Inpatient   Admission Date: 7/3/2024  Length of Stay: 2 days  Attending Physician: Parveen Wang MD  Primary Care Provider: Daniel Bob DO        Subjective:     Principal Problem:Myocardial infarction due to demand ischemia        HPI:  Patient is on 82-year-old female with PMHx of hypertension, hypothyroidism, pulmonary thromboembolism, CKD 3, longstanding persistent atrial fibrillation on Eliquis, nonobstructive CAD and right-sided weakness as result of CVA in 2021 who presents to [facility] transferred from [facility] due irregular heat beat and elevated troponin. Patient is a resident of [facility]. Patient has history of cognitive communication deficit, she is alert but non verbal. History was gathered from chart review and nursing staff. Per nursing staff patient was verbal asking what were they doing to her before my encounter.     On admission vital signs showed /84, HR 82, RR 15, SpO2 96% on room air and afebrile. Blood work at [facility] showed H/H 12.8/40.7, WBC 7.66, , sodium 137, potassium 4.2, BUN/Cr 33/1.03, glucose 114, total protein 6.9, albumin 2.9, CPK 21, troponin 90 and 87.5. EKG showing atrial fibrillation rate controlled, no ST ischemic changes. Chest xray negative for consolidation, effusion or infiltrates. Patient will be admitted for further management.    Overview/Hospital Course:  07/03 Records reviewed. Severe dementia and doesn't follow visional well or follows any commands. No family in room. Wouls not be a candidate for aggressive care. Plan discuss with family.  07/04 patient better today.  Following with eyes and nodded head wants to a question.  Was not following any other commands.  Apparently ate okay.  Enemas during the night but KUB still shows significant constipation.  Continue to  work on.  Heart rate up but missed some of medicines last p.m..  Getting medication restarted.  Discussed with nephew (Laurent Sorenson) by phone.  Nephew has power of .  Patient's   in  and nephew has been helping Ms. Carver since that time.  Initially she was living at home by herself.  Had developed progressive dementia and was unable to care for self and moved into the nursing home 2 years earlier.  Nephew states she had a stroke in 2018 and had issues with speech and memory afterwards.  He states she initially got better but then seemed to get worse with worsening dementia.  She has been seen in the past in neurology clinic.  Patient at baseline has been confused, occasionally talks and has not walked in about 2 years.  Nephew had seen patient proximally noon yesterday and felt she was at her baseline mental status then.  When I saw yesterday morning was not responding as well to me as she did today.  Patient has history of atrial fibrillation on chronic anticoagulation.  Had normal heart catheterizations in  and in  had Lexiscan Cardiolite study showed no reversible ischemia.  Chart mentions pulmonary emboli but nephew was not aware of patient ever having this and I can find nothing about it on any study or documentation other than listed on problem list.  Family has not wanted and feels patient would not want any aggressive care.  States that she has a DNR at nursing home and he would like for her to remain DNR here.  Apparently patient was taken to the hospital because of tachycardia.  We discussed her current constipation.  He states she has had this issue before and had to have constipation treated previously.    Will plan to relieve constipation and then plan to discharge back to nursing home.  No further workup planned   Mentally better and talking some but confused. Still trying to get constipation corrected    Interval History:     Review of Systems   Reason unable  to perform ROS: pt unable to give hx and no family in room.   Constitutional:  Negative for fever.        Nonambulatory last 2 years   HENT:  Negative for trouble swallowing.    Respiratory:  Negative for cough and shortness of breath.    Cardiovascular:         Episodes of tachycardia   Gastrointestinal:  Positive for constipation. Negative for nausea and vomiting.   Neurological:  Positive for speech difficulty.   Psychiatric/Behavioral:  Positive for confusion.      Objective:     Vital Signs (Most Recent):  Temp: 97.2 °F (36.2 °C) (07/05/24 1930)  Pulse: 93 (07/05/24 1930)  Resp: 17 (07/05/24 1930)  BP: 125/83 (07/05/24 1930)  SpO2: (!) 94 % (07/05/24 1930) Vital Signs (24h Range):  Temp:  [97.2 °F (36.2 °C)-97.9 °F (36.6 °C)] 97.2 °F (36.2 °C)  Pulse:  [80-94] 93  Resp:  [16-18] 17  SpO2:  [91 %-96 %] 94 %  BP: (103-125)/(60-83) 125/83     Weight: 60 kg (132 lb 4.4 oz)  Body mass index is 23.43 kg/m².  No intake or output data in the 24 hours ending 07/05/24 2136        Physical Exam  Vitals reviewed.   Constitutional:       General: She is awake. She is not in acute distress.     Appearance: She is well-developed. She is not toxic-appearing.      Comments: Follow some with eyes today  Got her to shake her head to questions appropriately once  Otherwise not following commands   HENT:      Head: Normocephalic.      Nose: Nose normal.      Mouth/Throat:      Pharynx: Oropharynx is clear.   Eyes:      Extraocular Movements: Extraocular movements intact.      Pupils: Pupils are equal, round, and reactive to light.   Neck:      Thyroid: No thyroid mass.      Vascular: No carotid bruit.   Cardiovascular:      Rate and Rhythm: Tachycardia present. Rhythm irregular.      Pulses: Normal pulses.      Heart sounds: Normal heart sounds. No murmur heard.  Pulmonary:      Effort: Pulmonary effort is normal.      Breath sounds: Normal breath sounds and air entry. No wheezing.   Abdominal:      General: Bowel sounds are  "normal. There is no distension.      Palpations: Abdomen is soft.      Tenderness: There is no abdominal tenderness.   Musculoskeletal:         General: Normal range of motion.      Cervical back: Neck supple. No rigidity.   Skin:     General: Skin is warm.      Coloration: Skin is not jaundiced.      Findings: No lesion.   Neurological:      General: No focal deficit present.      Mental Status: She is alert.      Cranial Nerves: No cranial nerve deficit.   Psychiatric:         Attention and Perception: Attention normal.         Behavior: Behavior is cooperative.         Cognition and Memory: Cognition normal.      Comments: Did not respond to me verbally and followed commands poorly             Significant Labs: All pertinent labs within the past 24 hours have been reviewed.  BMP:   No results for input(s): "GLU", "NA", "K", "CL", "CO2", "BUN", "CREATININE", "CALCIUM", "MG" in the last 48 hours.    CBC:   No results for input(s): "WBC", "HGB", "HCT", "PLT" in the last 48 hours.    CMP:   No results for input(s): "NA", "K", "CL", "CO2", "GLU", "BUN", "CREATININE", "CALCIUM", "PROT", "ALBUMIN", "BILITOT", "ALKPHOS", "AST", "ALT", "ANIONGAP", "EGFRNONAA" in the last 48 hours.    Invalid input(s): "ESTGFAFRICA"      Significant Imaging: I have reviewed all pertinent imaging results/findings within the past 24 hours.      Intake/Output - Last 3 Shifts         07/04 0700  07/05 0659 07/05 0700  07/06 0659          Urine Occurrence 3 x 1 x    Stool Occurrence 3 x 1 x          Microbiology Results (last 7 days)       ** No results found for the last 168 hours. **              Assessment/Plan:      * Myocardial infarction due to demand ischemia  Patient non verbal with history of chronic Afib presenting with elevated troponin.  Troponin 90 and 87.5. Troponin was mid 100s one year ago.  EKG showing atrial fibrillation rate controlled, no ST ischemic changes.   Cardiac monitoring  IV NS at 125cc/h  Aspirin, high intensity " statin  Monitor vital signs and physical exam.    07/04  EKG 7/26/2023 a fib with ventricular rate 105 bpm; septal infarct  2/9/2023 afib with ventricualr rate 96 bpm; anteroseptal infarct  ECHO Results for orders placed during the hospital encounter of 11/08/22     Echo     Interpretation Summary  · Atrial fibrillation observed.  · The left ventricle is normal in size with concentric remodeling and low normal systolic function.  · The estimated ejection fraction is 50%.  · Normal right ventricular size with normal right ventricular systolic function.  · Normal left ventricular diastolic function.  · The mean diastolic gradient across the mitral valve is 3 mmHg at a heart rate of 77 bpm.  · There is mild to moderate mitral stenosis.  · Moderate left atrial enlargement.  · Moderate right atrial enlargement.  · Normal central venous pressure (3 mmHg).  · The estimated PA systolic pressure is 22 mmHg.       [facility] 4/2/2013  Mild, non-obstructive CAD  Janet LV size and systolic function  No significant MR      Lexiscan Cardiolite: Impression:     1. Large lateral wall fixed perfusion defect near the base consistent with scar but with no evidence of ischemia.  2. Small, fixed apical perfusion defect consistent with artifact versus scar but no evidence of ischemia.  3. Normal left ventricular size with hyperdynamic left ventricular systolic function, ejection fraction 78%.     Electronically signed by:Isauro Tena  Date:                                            06/24/2021          Pulmonary embolism    Listed on problem list but see no studies or other specific details of a pulmonary embolism or deep venous thrombosis  Nephew was not aware of her having any previous thrombosis  She has been on Eliquis related to atrial fibrillation    Fecal impaction in rectum    07/04 Receiving enemas but has still some persistent constipation  If not relieved by enemas will consider Gastrografin enema when available  07/05 Unable to  do gastrografin enema secondary rectal impaction. Apparently impaction high and can't get digitally. Given MgCitrate. Consider Golytely prep but would have to get NGT to do. Recheck in am. Has passed several watery stools.    Hypothyroidism  Continue home synthroid 75mcg QD.      Gastroesophageal reflux disease  Continue home PPI.    History of CVA (cerebrovascular accident)  History of right-sided weakness as result of CVA in .  Continue home aspirin and statin.     nephew states patient had stroke in about  which caused some issues with patient's speech and memory.  He has not aware of any extremity weakness.  He has not aware of any stroke in .  States after some initial improvement she became progressively less able to care for self and more confused.  Was moved into the nursing home 2 years earlier.  Her  who had been her caregiver  in  in nephew has been looking after since that time      Severe dementia  Patient with dementia with likely etiology of unknown dementia. Dementia is severe. The patient does not have signs of behavioral disturbance. Home dementia medications are Held or Continued: continued.. Continue non-pharmacologic interventions to prevent delirium (No VS between 11PM-5AM, activity during day, opening blinds, providing glasses/hearing aids, and up in chair during daytime). Will avoid narcotics and benzos unless absolutely necessary. PRN anti-psychotics are not prescribed to avoid self harm behaviors.     get additional hx from family   patient at baseline is confused, talk some, not able to do any for activities of daily living and has not ambulated in 2 years  She has a DNR at nursing home in only would like to continue DNR and no aggressive care  Patient has history of stroke in 2018 affecting memory and speech.  Nephew states she improved for a period of time afterwards but then developed progressive dementia and was not able to care for self at  home.  Moved into nursing home 2 years earlier  07/05 Better talking some but confused    Coronary artery disease involving native coronary artery of native heart  See results of studies above but with previous evaluation has normal coronary arteries.     Hyperlipidemia  Continue home statin.      Atrial fibrillation  Patient with Long standing persistent (>12 months) atrial fibrillation which is controlled currently with Beta Blocker and Calcium Channel Blocker. Patient is currently in atrial fibrillation.[id number] Score: 4. HASBLED Score: 3. Anticoagulation indicated. Anticoagulation done with home Eliquis .    07/04 chronic atrial fibrillation with increased rate today but had been off medicines.  Also likely somewhat volume contracted.  Will hydrate restart oral medicines and watch heart rate.  She takes Eliquis also and will continue for now.  No history of any bleeding      VTE Risk Mitigation (From admission, onward)           Ordered     IP VTE LOW RISK PATIENT  Once        Comments: Patient on full anticoagulation    07/04/24 1323     apixaban tablet 2.5 mg  2 times daily         07/03/24 0348                    Discharge Planning   NATY:      Code Status: DNR   Is the patient medically ready for discharge?:     Reason for patient still in hospital (select all that apply): Treatment, Imaging, and Consult recommendations  Discharge Plan A: Return to nursing home                  Parveen Wang MD  Department of Hospital Medicine   [facility] - [facility] Medical Telemetry

## 2024-07-07 VITALS
SYSTOLIC BLOOD PRESSURE: 112 MMHG | WEIGHT: 132.25 LBS | OXYGEN SATURATION: 97 % | TEMPERATURE: 98 F | DIASTOLIC BLOOD PRESSURE: 67 MMHG | BODY MASS INDEX: 23.43 KG/M2 | HEIGHT: 63 IN | HEART RATE: 84 BPM | RESPIRATION RATE: 18 BRPM

## 2024-07-07 PROBLEM — I21.A1 MYOCARDIAL INFARCTION DUE TO DEMAND ISCHEMIA: Status: RESOLVED | Noted: 2022-11-08 | Resolved: 2024-07-07

## 2024-07-07 PROBLEM — N18.2 ACUTE RENAL FAILURE SUPERIMPOSED ON STAGE 2 CHRONIC KIDNEY DISEASE: Status: ACTIVE | Noted: 2024-07-07

## 2024-07-07 PROBLEM — E86.0 DEHYDRATION: Status: RESOLVED | Noted: 2024-07-07 | Resolved: 2024-07-07

## 2024-07-07 PROBLEM — G93.41 ENCEPHALOPATHY, METABOLIC: Status: RESOLVED | Noted: 2024-07-07 | Resolved: 2024-07-07

## 2024-07-07 PROBLEM — E86.0 DEHYDRATION: Status: ACTIVE | Noted: 2024-07-07

## 2024-07-07 PROBLEM — I26.99 PULMONARY EMBOLISM: Status: RESOLVED | Noted: 2024-07-04 | Resolved: 2024-07-07
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
## 2024-07-07 PROBLEM — N17.9 ACUTE RENAL FAILURE SUPERIMPOSED ON STAGE 2 CHRONIC KIDNEY DISEASE: Status: ACTIVE | Noted: 2024-07-07

## 2024-07-07 PROBLEM — G93.41 ENCEPHALOPATHY, METABOLIC: Status: ACTIVE | Noted: 2024-07-07

## 2024-07-07 PROBLEM — K56.41 FECAL IMPACTION IN RECTUM: Status: RESOLVED | Noted: 2024-07-03 | Resolved: 2024-07-07

## 2024-07-07 PROBLEM — R79.89 ELEVATED TROPONIN LEVEL NOT DUE MYOCARDIAL INFARCTION: Status: ACTIVE | Noted: 2024-07-07

## 2024-07-07 PROBLEM — R06.6 HICCUPS: Status: RESOLVED | Noted: 2022-12-19 | Resolved: 2024-07-07

## 2024-07-07 LAB
ANION GAP SERPL CALCULATED.3IONS-SCNC: 11 MMOL/L (ref 7–16)
BUN SERPL-MCNC: 16 MG/DL (ref 7–18)
BUN/CREAT SERPL: 24 (ref 6–20)
CALCIUM SERPL-MCNC: 8.7 MG/DL (ref 8.5–10.1)
CHLORIDE SERPL-SCNC: 104 MMOL/L (ref 98–107)
CO2 SERPL-SCNC: 27 MMOL/L (ref 21–32)
CREAT SERPL-MCNC: 0.66 MG/DL (ref 0.55–1.02)
EGFR (NO RACE VARIABLE) (RUSH/TITUS): 86 ML/MIN/1.73M2
GLUCOSE SERPL-MCNC: 64 MG/DL (ref 74–106)
POTASSIUM SERPL-SCNC: 3.8 MMOL/L (ref 3.5–5.1)
SODIUM SERPL-SCNC: 138 MMOL/L (ref 136–145)

## 2024-07-07 PROCEDURE — 25000003 PHARM REV CODE 250: Performed by: HOSPITALIST

## 2024-07-07 PROCEDURE — 80048 BASIC METABOLIC PNL TOTAL CA: CPT | Performed by: HOSPITALIST

## 2024-07-07 PROCEDURE — 25000003 PHARM REV CODE 250: Performed by: GENERAL PRACTICE

## 2024-07-07 PROCEDURE — 36415 COLL VENOUS BLD VENIPUNCTURE: CPT | Performed by: HOSPITALIST

## 2024-07-07 PROCEDURE — 99239 HOSP IP/OBS DSCHRG MGMT >30: CPT | Mod: ,,, | Performed by: HOSPITALIST

## 2024-07-07 RX ORDER — DIGOXIN 125 MCG
125 TABLET ORAL DAILY
Start: 2024-07-07 | End: 2025-07-07

## 2024-07-07 RX ORDER — DOCUSATE SODIUM 100 MG/1
100 CAPSULE, LIQUID FILLED ORAL DAILY
Start: 2024-07-07

## 2024-07-07 RX ADMIN — DILTIAZEM HYDROCHLORIDE 60 MG: 30 TABLET, FILM COATED ORAL at 05:07

## 2024-07-07 RX ADMIN — ASPIRIN 81 MG CHEWABLE TABLET 81 MG: 81 TABLET CHEWABLE at 10:07

## 2024-07-07 RX ADMIN — THERA TABS 1 TABLET: TAB at 10:07

## 2024-07-07 RX ADMIN — PANTOPRAZOLE SODIUM 40 MG: 40 TABLET, DELAYED RELEASE ORAL at 10:07

## 2024-07-07 RX ADMIN — LEVOTHYROXINE SODIUM 75 MCG: 75 TABLET ORAL at 10:07

## 2024-07-07 RX ADMIN — DILTIAZEM HYDROCHLORIDE 60 MG: 30 TABLET, FILM COATED ORAL at 12:07

## 2024-07-07 RX ADMIN — DOCUSATE SODIUM 200 MG: 100 CAPSULE, LIQUID FILLED ORAL at 10:07

## 2024-07-07 RX ADMIN — APIXABAN 2.5 MG: 2.5 TABLET, FILM COATED ORAL at 10:07

## 2024-07-07 NOTE — ASSESSMENT & PLAN NOTE
07/07 resolved and likely back at baseline  Condition likely related to volume contraction with dehydration and worsening  Low volume felt to be related by combination of patient's dementia and constipation

## 2024-07-07 NOTE — ASSESSMENT & PLAN NOTE
07/04 Receiving enemas but has still some persistent constipation  If not relieved by enemas will consider Gastrografin enema when available  07/05 Unable to do gastrografin enema secondary rectal impaction. Apparently impaction high and can't get digitally. Given MgCitrate. Consider Golytely prep but would have to get NGT to do. Recheck in am. Has passed several watery stools.  07/06 Recheck KUB in am and continue work on constipation

## 2024-07-07 NOTE — DISCHARGE SUMMARY
[facility] - [address] [place] [address] Medicine  Discharge Summary      Patient Name: Abdias Carver  MRN: [id number]  DIOMEDES: [id number]  Patient Class: IP- Inpatient  Admission Date: 7/3/2024  Hospital Length of Stay: 4 days  Discharge Date and Time:  07/07/2024 10:43 AM  Attending Physician: Parveen Wang MD   Discharging Provider: Parveen Wang MD  Primary Care Provider: Daniel Bob DO    Primary Care Team: Networked reference to record PCT     HPI:   Patient is on 82-year-old female with PMHx of hypertension, hypothyroidism, pulmonary thromboembolism, CKD 3, longstanding persistent atrial fibrillation on Eliquis, nonobstructive CAD and right-sided weakness as result of CVA in 2021 who presents to [facility] transferred from [facility] due irregular heat beat and elevated troponin. Patient is a resident of [facility]. Patient has history of cognitive communication deficit, she is alert but non verbal. History was gathered from chart review and nursing staff. Per nursing staff patient was verbal asking what were they doing to her before my encounter.     On admission vital signs showed /84, HR 82, RR 15, SpO2 96% on room air and afebrile. Blood work at [facility] showed H/H 12.8/40.7, WBC 7.66, , sodium 137, potassium 4.2, BUN/Cr 33/1.03, glucose 114, total protein 6.9, albumin 2.9, CPK 21, troponin 90 and 87.5. EKG showing atrial fibrillation rate controlled, no ST ischemic changes. Chest xray negative for consolidation, effusion or infiltrates. Patient will be admitted for further management.    * No surgery found *      Hospital Course:   07/03 Records reviewed. Severe dementia and doesn't follow visional well or follows any commands. No family in room. Wouls not be a candidate for aggressive care. Plan discuss with family.  07/04 patient better today.  Following with eyes and nodded head wants to a question.  Was not following any other commands.   Apparently ate okay.  Enemas during the night but KUB still shows significant constipation.  Continue to work on.  Heart rate up but missed some of medicines last p.m..  Getting medication restarted.  Discussed with nephew (Laurentmoy Sorenson) by phone.  Nephew has power of .  Patient's   in  and nephew has been helping Ms. Carver since that time.  Initially she was living at home by herself.  Had developed progressive dementia and was unable to care for self and moved into the nursing home 2 years earlier.  Nephew states she had a stroke in 2018 and had issues with speech and memory afterwards.  He states she initially got better but then seemed to get worse with worsening dementia.  She has been seen in the past in neurology clinic.  Patient at baseline has been confused, occasionally talks and has not walked in about 2 years.  Nephew had seen patient proximally noon yesterday and felt she was at her baseline mental status then.  When I saw yesterday morning was not responding as well to me as she did today.  Patient has history of atrial fibrillation on chronic anticoagulation.  Had normal heart catheterizations in  and in  had Lexiscan Cardiolite study showed no reversible ischemia.  Chart mentions pulmonary emboli but nephew was not aware of patient ever having this and I can find nothing about it on any study or documentation other than listed on problem list.  Family has not wanted and feels patient would not want any aggressive care.  States that she has a DNR at nursing home and he would like for her to remain DNR here.  Apparently patient was taken to the hospital because of tachycardia.  We discussed her current constipation.  He states she has had this issue before and had to have constipation treated previously.    Will plan to relieve constipation and then plan to discharge back to nursing home.  No further workup planned   Mentally better and talking some but  confused. Still trying to get constipation corrected   Looks Ok. Continue to work on constipation   more bowel movements and KUB looks much better.  Tolerating diet.  Patient is very much wanting to be discharged back to nursing home.  She is able to talk and follow commands but is confused.  Can understand simple ideas.  Will discharge back to nursing home.          Goals of Care Treatment Preferences:  Code Status: DNR      Consults:     Neuro  * Encephalopathy, metabolic-resolved as of 2024 resolved and likely back at baseline  Condition likely related to volume contraction with dehydration and worsening  Low volume felt to be related by combination of patient's dementia and constipation      History of CVA (cerebrovascular accident)  History of right-sided weakness as result of CVA in .  Continue home aspirin and statin.     nephew states patient had stroke in about  which caused some issues with patient's speech and memory.  He has not aware of any extremity weakness.  He has not aware of any stroke in .  States after some initial improvement she became progressively less able to care for self and more confused.  Was moved into the nursing home 2 years earlier.  Her  who had been her caregiver  in  in nephew has been looking after since that time      Severe dementia  Patient with dementia with likely etiology of unknown dementia. Dementia is severe. The patient does not have signs of behavioral disturbance. Home dementia medications are Held or Continued: continued.. Continue non-pharmacologic interventions to prevent delirium (No VS between 11PM-5AM, activity during day, opening blinds, providing glasses/hearing aids, and up in chair during daytime). Will avoid narcotics and benzos unless absolutely necessary. PRN anti-psychotics are not prescribed to avoid self harm behaviors.     get additional hx from family   patient at baseline is confused,  talk some, not able to do any for activities of daily living and has not ambulated in 2 years  She has a DNR at nursing home in only would like to continue DNR and no aggressive care  Patient has history of stroke in 2018 affecting memory and speech.  Nephew states she improved for a period of time afterwards but then developed progressive dementia and was not able to care for self at home.  Moved into nursing home 2 years earlier  07/05 Better talking some but confused    Cardiac/Vascular  Elevated troponin level not due myocardial infarction  Elevated troponin felt unlikely related to heart  Previous normal heart catheterizations  Troponins felt related to patient's degree of dehydration and some prerenal azotemia    Coronary artery disease involving native coronary artery of native heart  See results of studies above but with previous evaluation has normal coronary arteries.     Hyperlipidemia  Continue home statin.      Atrial fibrillation  Patient with Long standing persistent (>12 months) atrial fibrillation which is controlled currently with Beta Blocker and Calcium Channel Blocker. Patient is currently in atrial fibrillation.[id number] Score: 4. HASBLED Score: 3. Anticoagulation indicated. Anticoagulation done with home Eliquis .    07/04 chronic atrial fibrillation with increased rate today but had been off medicines.  Also likely somewhat volume contracted.  Will hydrate restart oral medicines and watch heart rate.  She takes Eliquis also and will continue for now.  No history of any bleeding    07/07 heart rate remains somewhat up and will go ahead and give a continuous dose of Lanoxin help with heart rate  Heart rate was elevated at time during her stay but controlled with medication    Renal/  Acute renal failure superimposed on stage 2 chronic kidney disease  Patient with acute kidney injury/acute renal failure likely due to pre-renal azotemia due to dehydration ALVARO is currently improving. Baseline  creatinine  0.95  - Labs reviewed- Renal function/electrolytes with Estimated Creatinine Clearance: 51.6 mL/min (based on SCr of 0.66 mg/dL). according to latest data. Monitor urine output and serial BMP and adjust therapy as needed. Avoid nephrotoxins and renally dose meds for GFR listed above.    Dehydration-resolved as of 7/7/2024    Likely degree of volume contraction which can worsen constipation  Taking in orally today well    Hematology  Pulmonary embolism-resolved as of 7/7/2024    Listed on problem list but see no studies or other specific details of a pulmonary embolism or deep venous thrombosis  Nephew was not aware of her having any previous thrombosis  She has been on Eliquis related to atrial fibrillation    Endocrine  Hypothyroidism  Continue home synthroid 75mcg QD.      GI  Gastroesophageal reflux disease  Continue home PPI.    Fecal impaction in rectum-resolved as of 7/7/2024 07/04 Receiving enemas but has still some persistent constipation  If not relieved by enemas will consider Gastrografin enema when available  07/05 Unable to do gastrografin enema secondary rectal impaction. Apparently impaction high and can't get digitally. Given MgCitrate. Consider Golytely prep but would have to get NGT to do. Recheck in am. Has passed several watery stools.  07/06 Recheck KUB in am and continue work on constipation  07/07 KUB looks much better.  This will need to be watched closely at nursing home.  Increased risk for continued constipation issues      Final Active Diagnoses:    Diagnosis Date Noted POA    Elevated troponin level not due myocardial infarction [R79.89] 07/07/2024 No    Acute renal failure superimposed on stage 2 chronic kidney disease [N17.9, N18.2] 07/07/2024 Yes    Hypothyroidism [E03.9]  Yes    Gastroesophageal reflux disease [K21.9] 05/11/2022 Yes     Chronic    History of CVA (cerebrovascular accident) [Z86.73] 10/18/2021 Not Applicable    Severe dementia [F03.C0] 08/13/2021 Yes     Atrial fibrillation [I48.91]  Yes    Hyperlipidemia [E78.5]  Yes      Problems Resolved During this Admission:    Diagnosis Date Noted Date Resolved POA    PRINCIPAL PROBLEM:  Encephalopathy, metabolic [G93.41] 07/07/2024 07/07/2024 Yes    Dehydration [E86.0] 07/07/2024 07/07/2024 Yes    Pulmonary embolism [I26.99] 07/04/2024 07/07/2024 Yes    Fecal impaction in rectum [K56.41] 07/03/2024 07/07/2024 Yes       Discharged Condition: fair    Disposition: Skilled Nursing Facility    Follow Up:    Patient Instructions:      Diet Adult Regular       Significant Diagnostic Studies: Labs: BMP:   Recent Labs   Lab 07/07/24  0425   GLU 64*      K 3.8      CO2 27   BUN 16   CREATININE 0.66   CALCIUM 8.7   , CMP   Recent Labs   Lab 07/07/24  0425      K 3.8      CO2 27   GLU 64*   BUN 16   CREATININE 0.66   CALCIUM 8.7   ANIONGAP 11   , and CBC   Recent Labs   Lab 07/06/24  0500   WBC 6.29   HGB 12.6   HCT 40.3          Intake/Output - Last 3 Shifts         07/05 0700 07/06 0659 07/06 0700 07/07 0659 07/07 0700 07/08 0659    Stool 2      Total Output 2      Net -2             Urine Occurrence 1 x 3 x 1 x    Stool Occurrence 2 x 3 x 1 x          Microbiology Results (last 7 days)       ** No results found for the last 168 hours. **                Pending Diagnostic Studies:       Procedure Component Value Units Date/Time    EKG 12-lead [[id number]]     Order Status: Sent Lab Status: No result     EKG 12-lead [[id number]]     Order Status: Sent Lab Status: No result     EXTRA TUBES [[id number]] Collected: 07/07/24 0425    Order Status: Sent Lab Status: In process Updated: 07/07/24 0500    Specimen: Blood, Venous     Narrative:      The following orders were created for panel order EXTRA TUBES.  Procedure                               Abnormality         Status                     ---------                               -----------         ------                     Lavender Top Hold[[id number]]                                In process                   Please view results for these tests on the individual orders.           Medications:  Reconciled Home Medications:      Medication List        START taking these medications      digoxin 125 mcg tablet  Commonly known as: LANOXIN  Take 1 tablet (125 mcg total) by mouth once daily.     docusate sodium 100 MG capsule  Commonly known as: COLACE  Take 1 capsule (100 mg total) by mouth once daily.            CONTINUE taking these medications      acetaminophen 500 MG tablet  Commonly known as: TYLENOL  Take 500 mg by mouth every 6 (six) hours as needed for Pain.     aspirin 81 MG Chew  Take 1 tablet (81 mg total) by mouth once daily.     atorvastatin 40 MG tablet  Commonly known as: LIPITOR  Take 1 tablet (40 mg total) by mouth every evening.     diltiaZEM 60 MG tablet  Commonly known as: CARDIZEM  Take 1 tablet (60 mg total) by mouth every 6 (six) hours.     ELIQUIS 2.5 mg Tab  Generic drug: apixaban  Take 2.5 mg by mouth 2 (two) times daily.     furosemide 20 MG tablet  Commonly known as: LASIX  Take 0.5 tablets (10 mg total) by mouth once daily.     levothyroxine 75 MCG tablet  Commonly known as: SYNTHROID  Take 1 tablet (75 mcg total) by mouth once daily.     metoclopramide HCl 5 MG tablet  Commonly known as: REGLAN  Take 5 mg by mouth 4 (four) times daily.     nitroGLYCERIN 0.4 MG SL tablet  Commonly known as: NITROSTAT  Place 1 tablet (0.4 mg total) under the tongue every 5 (five) minutes as needed for Chest pain. Put 1 tablet under your tongue as needed for chest pain; may take up to 3 doses 5 minutes apart. If no resolutiion of CP go to the nearest ED for evaluation.     omeprazole 20 MG tablet  Commonly known as: PriLOSEC OTC  Take 1 tablet (20 mg total) by mouth once daily.     ondansetron 4 MG tablet  Commonly known as: ZOFRAN  Take 4 mg by mouth every 6 (six) hours as needed for Nausea.     ONE DAILY MULTIVITAMIN per tablet  Generic drug:  multivitamin  Take 1 tablet by mouth once daily.     polyethylene glycol 17 gram/dose powder  Commonly known as: GLYCOLAX  Take 17 g by mouth once daily.              Indwelling Lines/Drains at time of discharge:   Lines/Drains/Airways       None                   Time spent on the discharge of patient: 35 minutes         Parveen Wang MD  Department of Hospital Medicine  [facility] - [address] [facility] Telemetry

## 2024-07-07 NOTE — ASSESSMENT & PLAN NOTE
Patient with acute kidney injury/acute renal failure likely due to pre-renal azotemia due to dehydration ALVARO is currently improving. Baseline creatinine  0.95  - Labs reviewed- Renal function/electrolytes with Estimated Creatinine Clearance: 51.6 mL/min (based on SCr of 0.66 mg/dL). according to latest data. Monitor urine output and serial BMP and adjust therapy as needed. Avoid nephrotoxins and renally dose meds for GFR listed above.

## 2024-07-07 NOTE — SUBJECTIVE & OBJECTIVE
Interval History:     Review of Systems   Reason unable to perform ROS: pt unable to give hx and no family in room.   Constitutional:  Negative for fever.        Nonambulatory last 2 years   HENT:  Negative for trouble swallowing.    Respiratory:  Negative for cough and shortness of breath.    Cardiovascular:         Episodes of tachycardia   Gastrointestinal:  Positive for constipation. Negative for nausea and vomiting.   Neurological:  Positive for speech difficulty.   Psychiatric/Behavioral:  Positive for confusion.      Objective:     Vital Signs (Most Recent):  Temp: 97.6 °F (36.4 °C) (07/06/24 1928)  Pulse: 80 (07/06/24 1928)  Resp: 18 (07/06/24 1928)  BP: 132/63 (07/06/24 1928)  SpO2: (!) 92 % (07/06/24 1928) Vital Signs (24h Range):  Temp:  [96.3 °F (35.7 °C)-98.3 °F (36.8 °C)] 97.6 °F (36.4 °C)  Pulse:  [] 80  Resp:  [16-18] 18  SpO2:  [92 %-96 %] 92 %  BP: (125-155)/(63-84) 132/63     Weight: 60 kg (132 lb 4.4 oz)  Body mass index is 23.43 kg/m².    Intake/Output Summary (Last 24 hours) at 7/6/2024 9085  Last data filed at 7/6/2024 0435  Gross per 24 hour   Intake --   Output 1 ml   Net -1 ml           Physical Exam  Vitals reviewed.   Constitutional:       General: She is awake. She is not in acute distress.     Appearance: She is well-developed. She is not toxic-appearing.      Comments: Follow some with eyes today  Got her to shake her head to questions appropriately once  Otherwise not following commands   HENT:      Head: Normocephalic.      Nose: Nose normal.      Mouth/Throat:      Pharynx: Oropharynx is clear.   Eyes:      Extraocular Movements: Extraocular movements intact.      Pupils: Pupils are equal, round, and reactive to light.   Neck:      Thyroid: No thyroid mass.      Vascular: No carotid bruit.   Cardiovascular:      Rate and Rhythm: Tachycardia present. Rhythm irregular.      Pulses: Normal pulses.      Heart sounds: Normal heart sounds. No murmur heard.  Pulmonary:      Effort:  "Pulmonary effort is normal.      Breath sounds: Normal breath sounds and air entry. No wheezing.   Abdominal:      General: Bowel sounds are normal. There is no distension.      Palpations: Abdomen is soft.      Tenderness: There is no abdominal tenderness.   Musculoskeletal:         General: Normal range of motion.      Cervical back: Neck supple. No rigidity.   Skin:     General: Skin is warm.      Coloration: Skin is not jaundiced.      Findings: No lesion.   Neurological:      General: No focal deficit present.      Mental Status: She is alert.      Cranial Nerves: No cranial nerve deficit.   Psychiatric:         Attention and Perception: Attention normal.         Behavior: Behavior is cooperative.         Cognition and Memory: Cognition normal.      Comments: Did not respond to me verbally and followed commands poorly             Significant Labs: All pertinent labs within the past 24 hours have been reviewed.  BMP:   No results for input(s): "GLU", "NA", "K", "CL", "CO2", "BUN", "CREATININE", "CALCIUM", "MG" in the last 48 hours.    CBC:   Recent Labs   Lab 07/06/24  0500   WBC 6.29   HGB 12.6   HCT 40.3          CMP:   No results for input(s): "NA", "K", "CL", "CO2", "GLU", "BUN", "CREATININE", "CALCIUM", "PROT", "ALBUMIN", "BILITOT", "ALKPHOS", "AST", "ALT", "ANIONGAP", "EGFRNONAA" in the last 48 hours.    Invalid input(s): "ESTGFAFRICA"      Significant Imaging: I have reviewed all pertinent imaging results/findings within the past 24 hours.      Intake/Output - Last 3 Shifts         07/05 0700  07/06 0659 07/06 0700  07/07 0659    Stool 2     Total Output 2     Net -2           Urine Occurrence 1 x 2 x    Stool Occurrence 2 x 2 x          Microbiology Results (last 7 days)       ** No results found for the last 168 hours. **            "

## 2024-07-07 NOTE — ASSESSMENT & PLAN NOTE
Elevated troponin felt unlikely related to heart  Previous normal heart catheterizations  Troponins felt related to patient's degree of dehydration and some prerenal azotemia

## 2024-07-07 NOTE — ASSESSMENT & PLAN NOTE
07/04 Receiving enemas but has still some persistent constipation  If not relieved by enemas will consider Gastrografin enema when available  07/05 Unable to do gastrografin enema secondary rectal impaction. Apparently impaction high and can't get digitally. Given MgCitrate. Consider Golytely prep but would have to get NGT to do. Recheck in am. Has passed several watery stools.  07/06 Recheck KUB in am and continue work on constipation  07/07 KUB looks much better.  This will need to be watched closely at nursing home.  Increased risk for continued constipation issues

## 2024-07-07 NOTE — NURSING
Report called to Montana Veliz RN at [facility] nursing [facility]. Pt will transport by [facility]

## 2024-07-07 NOTE — PROGRESS NOTES
[facility] Tele[facility] Hospital Medicine  Progress Note    Patient Name: Abdias Carver  MRN: [id number]  Patient Class: IP- Inpatient   Admission Date: 7/3/2024  Length of Stay: 3 days  Attending Physician: Parveen Wang MD  Primary Care Provider: Daniel Bob DO        Subjective:     Principal Problem:Myocardial infarction due to demand ischemia        HPI:  Patient is on 82-year-old female with PMHx of hypertension, hypothyroidism, pulmonary thromboembolism, CKD 3, longstanding persistent atrial fibrillation on Eliquis, nonobstructive CAD and right-sided weakness as result of CVA in 2021 who presents to [facility] transferred from [facility] due irregular heat beat and elevated troponin. Patient is a resident of [facility]. Patient has history of cognitive communication deficit, she is alert but non verbal. History was gathered from chart review and nursing staff. Per nursing staff patient was verbal asking what were they doing to her before my encounter.     On admission vital signs showed /84, HR 82, RR 15, SpO2 96% on room air and afebrile. Blood work at [facility] showed H/H 12.8/40.7, WBC 7.66, , sodium 137, potassium 4.2, BUN/Cr 33/1.03, glucose 114, total protein 6.9, albumin 2.9, CPK 21, troponin 90 and 87.5. EKG showing atrial fibrillation rate controlled, no ST ischemic changes. Chest xray negative for consolidation, effusion or infiltrates. Patient will be admitted for further management.    Overview/Hospital Course:  07/03 Records reviewed. Severe dementia and doesn't follow visional well or follows any commands. No family in room. Wouls not be a candidate for aggressive care. Plan discuss with family.  07/04 patient better today.  Following with eyes and nodded head wants to a question.  Was not following any other commands.  Apparently ate okay.  Enemas during the night but KUB still shows significant constipation.  Continue to  work on.  Heart rate up but missed some of medicines last p.m..  Getting medication restarted.  Discussed with nephew (Laurent Sorenson) by phone.  Nephew has power of .  Patient's   in  and nephew has been helping Ms. Carver since that time.  Initially she was living at home by herself.  Had developed progressive dementia and was unable to care for self and moved into the nursing home 2 years earlier.  Nephew states she had a stroke in 2018 and had issues with speech and memory afterwards.  He states she initially got better but then seemed to get worse with worsening dementia.  She has been seen in the past in neurology clinic.  Patient at baseline has been confused, occasionally talks and has not walked in about 2 years.  Nephew had seen patient proximally noon yesterday and felt she was at her baseline mental status then.  When I saw yesterday morning was not responding as well to me as she did today.  Patient has history of atrial fibrillation on chronic anticoagulation.  Had normal heart catheterizations in  and in  had Lexiscan Cardiolite study showed no reversible ischemia.  Chart mentions pulmonary emboli but nephew was not aware of patient ever having this and I can find nothing about it on any study or documentation other than listed on problem list.  Family has not wanted and feels patient would not want any aggressive care.  States that she has a DNR at nursing home and he would like for her to remain DNR here.  Apparently patient was taken to the hospital because of tachycardia.  We discussed her current constipation.  He states she has had this issue before and had to have constipation treated previously.    Will plan to relieve constipation and then plan to discharge back to nursing home.  No further workup planned   Mentally better and talking some but confused. Still trying to get constipation corrected   Looks Ok. Continue to work on  constipation    Interval History:     Review of Systems   Reason unable to perform ROS: pt unable to give hx and no family in room.   Constitutional:  Negative for fever.        Nonambulatory last 2 years   HENT:  Negative for trouble swallowing.    Respiratory:  Negative for cough and shortness of breath.    Cardiovascular:         Episodes of tachycardia   Gastrointestinal:  Positive for constipation. Negative for nausea and vomiting.   Neurological:  Positive for speech difficulty.   Psychiatric/Behavioral:  Positive for confusion.      Objective:     Vital Signs (Most Recent):  Temp: 97.6 °F (36.4 °C) (07/06/24 1928)  Pulse: 80 (07/06/24 1928)  Resp: 18 (07/06/24 1928)  BP: 132/63 (07/06/24 1928)  SpO2: (!) 92 % (07/06/24 1928) Vital Signs (24h Range):  Temp:  [96.3 °F (35.7 °C)-98.3 °F (36.8 °C)] 97.6 °F (36.4 °C)  Pulse:  [] 80  Resp:  [16-18] 18  SpO2:  [92 %-96 %] 92 %  BP: (125-155)/(63-84) 132/63     Weight: 60 kg (132 lb 4.4 oz)  Body mass index is 23.43 kg/m².    Intake/Output Summary (Last 24 hours) at 7/6/2024 6163  Last data filed at 7/6/2024 0435  Gross per 24 hour   Intake --   Output 1 ml   Net -1 ml           Physical Exam  Vitals reviewed.   Constitutional:       General: She is awake. She is not in acute distress.     Appearance: She is well-developed. She is not toxic-appearing.      Comments: Follow some with eyes today  Got her to shake her head to questions appropriately once  Otherwise not following commands   HENT:      Head: Normocephalic.      Nose: Nose normal.      Mouth/Throat:      Pharynx: Oropharynx is clear.   Eyes:      Extraocular Movements: Extraocular movements intact.      Pupils: Pupils are equal, round, and reactive to light.   Neck:      Thyroid: No thyroid mass.      Vascular: No carotid bruit.   Cardiovascular:      Rate and Rhythm: Tachycardia present. Rhythm irregular.      Pulses: Normal pulses.      Heart sounds: Normal heart sounds. No murmur  "heard.  Pulmonary:      Effort: Pulmonary effort is normal.      Breath sounds: Normal breath sounds and air entry. No wheezing.   Abdominal:      General: Bowel sounds are normal. There is no distension.      Palpations: Abdomen is soft.      Tenderness: There is no abdominal tenderness.   Musculoskeletal:         General: Normal range of motion.      Cervical back: Neck supple. No rigidity.   Skin:     General: Skin is warm.      Coloration: Skin is not jaundiced.      Findings: No lesion.   Neurological:      General: No focal deficit present.      Mental Status: She is alert.      Cranial Nerves: No cranial nerve deficit.   Psychiatric:         Attention and Perception: Attention normal.         Behavior: Behavior is cooperative.         Cognition and Memory: Cognition normal.      Comments: Did not respond to me verbally and followed commands poorly             Significant Labs: All pertinent labs within the past 24 hours have been reviewed.  BMP:   No results for input(s): "GLU", "NA", "K", "CL", "CO2", "BUN", "CREATININE", "CALCIUM", "MG" in the last 48 hours.    CBC:   Recent Labs   Lab 07/06/24  0500   WBC 6.29   HGB 12.6   HCT 40.3          CMP:   No results for input(s): "NA", "K", "CL", "CO2", "GLU", "BUN", "CREATININE", "CALCIUM", "PROT", "ALBUMIN", "BILITOT", "ALKPHOS", "AST", "ALT", "ANIONGAP", "EGFRNONAA" in the last 48 hours.    Invalid input(s): "ESTGFAFRICA"      Significant Imaging: I have reviewed all pertinent imaging results/findings within the past 24 hours.      Intake/Output - Last 3 Shifts         07/05 0700  07/06 0659 07/06 0700  07/07 0659    Stool 2     Total Output 2     Net -2           Urine Occurrence 1 x 2 x    Stool Occurrence 2 x 2 x          Microbiology Results (last 7 days)       ** No results found for the last 168 hours. **              Assessment/Plan:      * Myocardial infarction due to demand ischemia  Patient non verbal with history of chronic Afib presenting " with elevated troponin.  Troponin 90 and 87.5. Troponin was mid 100s one year ago.  EKG showing atrial fibrillation rate controlled, no ST ischemic changes.   Cardiac monitoring  IV NS at 125cc/h  Aspirin, high intensity statin  Monitor vital signs and physical exam.    07/04  EKG 7/26/2023 a fib with ventricular rate 105 bpm; septal infarct  2/9/2023 afib with ventricualr rate 96 bpm; anteroseptal infarct  ECHO Results for orders placed during the hospital encounter of 11/08/22     Echo     Interpretation Summary  · Atrial fibrillation observed.  · The left ventricle is normal in size with concentric remodeling and low normal systolic function.  · The estimated ejection fraction is 50%.  · Normal right ventricular size with normal right ventricular systolic function.  · Normal left ventricular diastolic function.  · The mean diastolic gradient across the mitral valve is 3 mmHg at a heart rate of 77 bpm.  · There is mild to moderate mitral stenosis.  · Moderate left atrial enlargement.  · Moderate right atrial enlargement.  · Normal central venous pressure (3 mmHg).  · The estimated PA systolic pressure is 22 mmHg.       [facility] 4/[facility]  Mild, non-obstructive CAD  Janet LV size and systolic function  No significant MR      Lexiscan Cardiolite: Impression:     1. Large lateral wall fixed perfusion defect near the base consistent with scar but with no evidence of ischemia.  2. Small, fixed apical perfusion defect consistent with artifact versus scar but no evidence of ischemia.  3. Normal left ventricular size with hyperdynamic left ventricular systolic function, ejection fraction 78%.     Electronically signed by:Isauro Tena  Date:                                            06/24/2021          Pulmonary embolism    Listed on problem list but see no studies or other specific details of a pulmonary embolism or deep venous thrombosis  Nephew was not aware of her having any previous thrombosis  She has been on Eliquis  related to atrial fibrillation    Fecal impaction in rectum     Receiving enemas but has still some persistent constipation  If not relieved by enemas will consider Gastrografin enema when available   Unable to do gastrografin enema secondary rectal impaction. Apparently impaction high and can't get digitally. Given MgCitrate. Consider Golytely prep but would have to get NGT to do. Recheck in am. Has passed several watery stools.   Recheck KUB in am and continue work on constipation    Hypothyroidism  Continue home synthroid 75mcg QD.      Gastroesophageal reflux disease  Continue home PPI.    History of CVA (cerebrovascular accident)  History of right-sided weakness as result of CVA in .  Continue home aspirin and statin.     nephew states patient had stroke in about  which caused some issues with patient's speech and memory.  He has not aware of any extremity weakness.  He has not aware of any stroke in .  States after some initial improvement she became progressively less able to care for self and more confused.  Was moved into the nursing home 2 years earlier.  Her  who had been her caregiver  in  in nephew has been looking after since that time      Severe dementia  Patient with dementia with likely etiology of unknown dementia. Dementia is severe. The patient does not have signs of behavioral disturbance. Home dementia medications are Held or Continued: continued.. Continue non-pharmacologic interventions to prevent delirium (No VS between 11PM-5AM, activity during day, opening blinds, providing glasses/hearing aids, and up in chair during daytime). Will avoid narcotics and benzos unless absolutely necessary. PRN anti-psychotics are not prescribed to avoid self harm behaviors.     get additional hx from family   patient at baseline is confused, talk some, not able to do any for activities of daily living and has not ambulated in 2 years  She has a DNR at  nursing home in only would like to continue DNR and no aggressive care  Patient has history of stroke in 2018 affecting memory and speech.  Nephew states she improved for a period of time afterwards but then developed progressive dementia and was not able to care for self at home.  Moved into nursing home 2 years earlier  07/05 Better talking some but confused    Coronary artery disease involving native coronary artery of native heart  See results of studies above but with previous evaluation has normal coronary arteries.     Hyperlipidemia  Continue home statin.      Atrial fibrillation  Patient with Long standing persistent (>12 months) atrial fibrillation which is controlled currently with Beta Blocker and Calcium Channel Blocker. Patient is currently in atrial fibrillation.[id number] Score: 4. HASBLED Score: 3. Anticoagulation indicated. Anticoagulation done with home Eliquis .    07/04 chronic atrial fibrillation with increased rate today but had been off medicines.  Also likely somewhat volume contracted.  Will hydrate restart oral medicines and watch heart rate.  She takes Eliquis also and will continue for now.  No history of any bleeding      VTE Risk Mitigation (From admission, onward)           Ordered     IP VTE LOW RISK PATIENT  Once        Comments: Patient on full anticoagulation    07/04/24 1323     apixaban tablet 2.5 mg  2 times daily         07/03/24 0348                    Discharge Planning   NATY:      Code Status: DNR   Is the patient medically ready for discharge?:     Reason for patient still in hospital (select all that apply): Treatment and Pending disposition  Discharge Plan A: Return to nursing home                  Parveen Wang MD  Department of Hospital Medicine   [facility] - [facility] Tele[facility]

## 2024-07-07 NOTE — NURSING
Pt discharged back to [facility] in [place] transported by [facility]

## 2024-07-07 NOTE — ASSESSMENT & PLAN NOTE
Patient with Long standing persistent (>12 months) atrial fibrillation which is controlled currently with Beta Blocker and Calcium Channel Blocker. Patient is currently in atrial fibrillation.[id number] Score: 4. HASBLED Score: 3. Anticoagulation indicated. Anticoagulation done with home Eliquis .    07/04 chronic atrial fibrillation with increased rate today but had been off medicines.  Also likely somewhat volume contracted.  Will hydrate restart oral medicines and watch heart rate.  She takes Eliquis also and will continue for now.  No history of any bleeding    07/07 heart rate remains somewhat up and will go ahead and give a continuous dose of Lanoxin help with heart rate  Heart rate was elevated at time during her stay but controlled with medication

## 2024-07-08 NOTE — PLAN OF CARE
[facility] - [facility] Telemetry  Discharge Final Note    Primary Care Provider: Daniel Bob DO    Expected Discharge Date: 7/7/2024    Final Discharge Note (most recent)       Final Note - 07/08/24 1506          Final Note    Assessment Type Final Discharge Note     Anticipated Discharge Disposition Planned Readmission - Nursing Facility        Post-Acute Status    Post-Acute Authorization Placement     Post-Acute Placement Status Set-up Complete/Auth obtained     Discharge Delays None known at this time                     Important Message from Medicare  Important Message from Medicare regarding Discharge Appeal Rights: Signed/date by patient/caregiver     Date IMM was signed: 07/05/24  Time IMM was signed: [id number]      Pt d/c back to [facility]

## 2024-07-14 LAB
OHS QRS DURATION: 62 MS
OHS QRS DURATION: 64 MS
OHS QTC CALCULATION: 437 MS
OHS QTC CALCULATION: 443 MS

## 2024-07-14 NOTE — PHYSICIAN QUERY
"Due to conflicting documentation,  Please clarify the stage of chronic kidney disease (CKD).    To respond, click "New Note" select your response press enter then sign to complete the query     Chronic Kidney Disease (CKD) stage 2 (eGFR 60-89)        Prior renal function consistent with CKD stage 3 but during stage renal function improved better than baseline and consistent with CKD stage 2  "

## 2024-08-20 ENCOUNTER — OFFICE VISIT (OUTPATIENT)
Dept: CARDIOLOGY | Facility: CLINIC | Age: 84
End: 2024-08-20
Payer: MEDICARE

## 2024-08-20 VITALS
OXYGEN SATURATION: 99 % | BODY MASS INDEX: 23.39 KG/M2 | WEIGHT: 132 LBS | DIASTOLIC BLOOD PRESSURE: 64 MMHG | HEIGHT: 63 IN | SYSTOLIC BLOOD PRESSURE: 110 MMHG | HEART RATE: 84 BPM

## 2024-08-20 DIAGNOSIS — E78.5 HYPERLIPIDEMIA, UNSPECIFIED HYPERLIPIDEMIA TYPE: ICD-10-CM

## 2024-08-20 DIAGNOSIS — I48.11 LONGSTANDING PERSISTENT ATRIAL FIBRILLATION: Primary | ICD-10-CM

## 2024-08-20 DIAGNOSIS — I10 HYPERTENSION, UNSPECIFIED TYPE: ICD-10-CM

## 2024-08-20 DIAGNOSIS — I25.10 CORONARY ARTERY DISEASE INVOLVING NATIVE CORONARY ARTERY OF NATIVE HEART WITHOUT ANGINA PECTORIS: ICD-10-CM

## 2024-08-20 DIAGNOSIS — Z71.89 COMPLEX CARE COORDINATION: ICD-10-CM

## 2024-08-20 LAB
OHS QRS DURATION: 66 MS
OHS QTC CALCULATION: 387 MS

## 2024-08-20 PROCEDURE — 93010 ELECTROCARDIOGRAM REPORT: CPT | Mod: S$PBB,,, | Performed by: INTERNAL MEDICINE

## 2024-08-20 PROCEDURE — 99214 OFFICE O/P EST MOD 30 MIN: CPT | Mod: S$PBB,,, | Performed by: NURSE PRACTITIONER

## 2024-08-20 PROCEDURE — 99999 PR PBB SHADOW E&M-EST. PATIENT-LVL V: CPT | Mod: PBBFAC,,, | Performed by: NURSE PRACTITIONER

## 2024-08-20 PROCEDURE — 99215 OFFICE O/P EST HI 40 MIN: CPT | Mod: PBBFAC,25 | Performed by: NURSE PRACTITIONER

## 2024-08-20 PROCEDURE — 93005 ELECTROCARDIOGRAM TRACING: CPT | Mod: PBBFAC | Performed by: INTERNAL MEDICINE

## 2024-08-20 RX ORDER — DILTIAZEM HYDROCHLORIDE 240 MG/1
240 CAPSULE, COATED, EXTENDED RELEASE ORAL DAILY
COMMUNITY
Start: 2024-05-13

## 2024-08-20 NOTE — PROGRESS NOTES
PCP: Daniel Bob DO    Referring Provider:   Chief Complaint   Patient presents with    Atrial Fibrillation    Hyperlipidemia    Hypertension    Coronary Artery Disease    Shortness of Breath     At times.       Subjective:   Abdias Carver is a 83 y.o. female with hx of mild, nonobstructive CAD, chronic, persistent A fib, GERD, HLD, and HTN,  who presents for HD follow up for admission to [facility] for dehydration, acute on chronic renal failure and a fib with RVR. The patient has returned to the nursing home and is doing well. She states that she has occasional SOB. She also has a severe dementia and is a DNR.     2/20/2024 presents for routine follow up. Patient denies complaints.    7/26/2023 presents for routine follow up. She denies complaints other than lower ext edema that resolves with elevation.         Fhx:  Family History   Problem Relation Name Age of Onset    Diabetes Mother      Hypertension Mother      Heart disease Mother      Cancer Mother      Heart disease Father      Hypertension Father      Parkinsonism Sister      Alcohol abuse Daughter       Shx:   Social History     Socioeconomic History    Marital status:    Tobacco Use    Smoking status: Never    Smokeless tobacco: Never   Substance and Sexual Activity    Alcohol use: Never    Drug use: Never    Sexual activity: Not Currently     Social Determinants of Health     Financial Resource Strain: Low Risk  (7/3/2024)    Overall Financial Resource Strain (CARDIA)     Difficulty of Paying Living Expenses: Not hard at all   Food Insecurity: No Food Insecurity (7/3/2024)    Hunger Vital Sign     Worried About Running Out of Food in the Last Year: Never true     Ran Out of Food in the Last Year: Never true   Transportation Needs: No Transportation Needs (7/3/2024)    TRANSPORTATION NEEDS     Transportation : No   Physical Activity: Inactive (7/3/2024)    Exercise Vital Sign     Days of Exercise per Week: 0 days     Minutes of  Exercise per Session: 0 min   Stress: Patient Unable To Answer (7/3/2024)    [employer] - Occupational Stress Questionnaire     Feeling of Stress : Patient unable to answer   Housing Stability: Low Risk  (11/9/2022)    Housing Stability Vital Sign     Unable to Pay for Housing in the Last Year: No     Number of Places Lived in the Last Year: 1     Unstable Housing in the Last Year: No       EKG   8/20/2024 atrial fibrillation ; HR 79 bpm; low voltage QRS; can not r/o anteroseptal infarct (cited on or before 7/26/2023); when compared with EKG of 7/6/2024 vent rate has decreased by 52 bpm and serial changes of anteroseptal infarct present  2/20/2024 a fib with ventricular rate 126 bpm; can not r/o anteroseptal infarct (cited on or before 7/26/2023); when compared with EKG 7/26/2023 NSTWA improved in Inferior leads; NSTWA no longer evident in lateral leads  7/26/2023 a fib with ventricular rate 105 bpm; septal infarct  2/9/2023 afib with ventricualr rate 96 bpm; anteroseptal infarct    Results for orders placed during the hospital encounter of 07/03/24    Echo    Interpretation Summary    Left Ventricle: The left ventricle is normal in size. Normal wall thickness. There is low normal systolic function with a visually estimated ejection fraction of 50 - 55%. Ejection fraction by visual approximation is 50%.    Right Ventricle: Normal right ventricular cavity size.    Left Atrium: Left atrium is mildly dilated.    Aortic Valve: The aortic valve is a trileaflet valve. There is moderate aortic valve sclerosis. Mildly calcified cusps. There is mild to moderate aortic regurgitation.    Mitral Valve: There is moderate bileaflet sclerosis. Moderately thickened leaflets. Moderately calcified leaflets. There is no stenosis. The mean pressure gradient across the mitral valve is 3 mmHg at a heart rate of  bpm. There is mild regurgitation.    Tricuspid Valve: There is mild regurgitation.  No pulmonary  hypertension.    ECHO Results for orders placed during the hospital encounter of 11/08/22    Echo    Interpretation Summary  · Atrial fibrillation observed.  · The left ventricle is normal in size with concentric remodeling and low normal systolic function.  · The estimated ejection fraction is 50%.  · Normal right ventricular size with normal right ventricular systolic function.  · Normal left ventricular diastolic function.  · The mean diastolic gradient across the mitral valve is 3 mmHg at a heart rate of 77 bpm.  · There is mild to moderate mitral stenosis.  · Moderate left atrial enlargement.  · Moderate right atrial enlargement.  · Normal central venous pressure (3 mmHg).  · The estimated PA systolic pressure is 22 mmHg.    [facility] 4/2/2013  Mild, non-obstructive CAD  Janet LV size and systolic function  No significant MR      Lab Results   Component Value Date     07/07/2024    K 3.8 07/07/2024     07/07/2024    CO2 27 07/07/2024    BUN 16 07/07/2024    CREATININE 0.66 07/07/2024    CALCIUM 8.7 07/07/2024    ANIONGAP 11 07/07/2024    ESTGFRAFRICA 46 05/09/2021    EGFRNONAA 48 (L) 06/07/2022       Lab Results   Component Value Date    CHOL 118 05/09/2024    CHOL 119 11/17/2023    CHOL 109 05/16/2023     Lab Results   Component Value Date    HDL 49 05/09/2024    HDL 46 11/17/2023    HDL 49 05/16/2023     Lab Results   Component Value Date    LDLCALC 50 05/09/2024    LDLCALC 52 11/17/2023    LDLCALC 44 05/16/2023     Lab Results   Component Value Date    TRIG 95 05/09/2024    TRIG 104 11/17/2023    TRIG 82 05/16/2023     Lab Results   Component Value Date    CHOLHDL 2.4 05/09/2024    CHOLHDL 2.6 11/17/2023    CHOLHDL 2.2 05/16/2023       Lab Results   Component Value Date    WBC 6.29 07/06/2024    HGB 12.6 07/06/2024    HCT 40.3 07/06/2024    MCV 88.6 07/06/2024     07/06/2024           Current Outpatient Medications:     acetaminophen (TYLENOL) 500 MG tablet, Take 500 mg by mouth every 6 (six)  hours as needed for Pain., Disp: , Rfl:     aspirin 81 MG Chew, Take 1 tablet (81 mg total) by mouth once daily., Disp: 30 tablet, Rfl: 0    atorvastatin (LIPITOR) 40 MG tablet, Take 1 tablet (40 mg total) by mouth every evening., Disp: 30 tablet, Rfl: 5    digoxin (LANOXIN) 125 mcg tablet, Take 1 tablet (125 mcg total) by mouth once daily., Disp: , Rfl:     diltiaZEM (CARDIZEM CD) 240 MG 24 hr capsule, Take 240 mg by mouth once daily., Disp: , Rfl:     docusate sodium (COLACE) 100 MG capsule, Take 1 capsule (100 mg total) by mouth once daily. (Patient taking differently: Take 100 mg by mouth 2 (two) times daily.), Disp: , Rfl:     ELIQUIS 2.5 mg Tab, Take 2.5 mg by mouth 2 (two) times daily., Disp: , Rfl:     furosemide (LASIX) 20 MG tablet, Take 0.5 tablets (10 mg total) by mouth once daily., Disp: 30 tablet, Rfl: 0    levothyroxine (SYNTHROID) 75 MCG tablet, Take 1 tablet (75 mcg total) by mouth once daily., Disp: 30 tablet, Rfl: 5    metoclopramide HCl (REGLAN) 5 MG tablet, Take 5 mg by mouth 3 (three) times daily before meals., Disp: , Rfl:     multivitamin (ONE DAILY MULTIVITAMIN) per tablet, Take 1 tablet by mouth once daily., Disp: , Rfl:     nitroGLYCERIN (NITROSTAT) 0.4 MG SL tablet, Place 1 tablet (0.4 mg total) under the tongue every 5 (five) minutes as needed for Chest pain. Put 1 tablet under your tongue as needed for chest pain; may take up to 3 doses 5 minutes apart. If no resolutiion of CP go to the nearest ED for evaluation., Disp: 25 tablet, Rfl: 3    omeprazole (PRILOSEC OTC) 20 MG tablet, Take 1 tablet (20 mg total) by mouth once daily., Disp: 30 tablet, Rfl: 5    ondansetron (ZOFRAN) 4 MG tablet, Take 4 mg by mouth every 6 (six) hours as needed for Nausea., Disp: , Rfl:     polyethylene glycol (GLYCOLAX) 17 gram/dose powder, Take 17 g by mouth once daily., Disp: , Rfl:   Meds reviewed and reconciled using the list from the nursing home.       Review of Systems   Respiratory:  Negative for  "shortness of breath.    Cardiovascular:  Negative for chest pain, palpitations, orthopnea, claudication, leg swelling and PND.             Neurological:  Negative for dizziness, loss of consciousness and weakness.           Objective:   /64 (BP Location: Left arm, Patient Position: Sitting)   Pulse 84   Ht 5' 3" (1.6 m)   Wt 59.9 kg (132 lb)   SpO2 99%   BMI 23.38 kg/m²     Physical Exam  Vitals and nursing note reviewed.   Constitutional:       Appearance: Normal appearance. She is normal weight.      Comments: In a wheelchair   HENT:      Head: Normocephalic and atraumatic.   Neck:      Vascular: No carotid bruit.   Cardiovascular:      Rate and Rhythm: Normal rate. Rhythm irregular.      Pulses: Normal pulses.      Heart sounds: Normal heart sounds.   Pulmonary:      Effort: Pulmonary effort is normal.      Breath sounds: Normal breath sounds.   Abdominal:      Palpations: Abdomen is soft.   Musculoskeletal:      Cervical back: Neck supple.      Right lower leg: No edema.      Left lower leg: No edema.   Skin:     General: Skin is warm and dry.      Capillary Refill: Capillary refill takes less than 2 seconds.   Neurological:      General: No focal deficit present.      Mental Status: She is alert.           Assessment:     1. Longstanding persistent atrial fibrillation  EKG 12-lead    EKG 12-lead      2. Coronary artery disease involving native coronary artery of native heart without angina pectoris        3. Hyperlipidemia, unspecified hyperlipidemia type        4. Hypertension, unspecified type              Plan:   Atrial fibrillation  Chronic persistent  Admitted 7/3/2024-7/7/2024 for issues with RVR, dehydration and acute on chronic renal failure. Patient has had no issues since hospital DC.    EKG demonstrated a fib with HR 79 bpm;   Continue Cardizem  mg po daily; Digoxin .125 mg po daily; and Eliquis 2.5 mg po bid    Coronary artery disease involving native coronary artery of native " heart  Asymptomatic  Continue ASA/Lipitor    Hyperlipidemia  Lab Results   Component Value Date    CHOL 118 05/09/2024    CHOL 119 11/17/2023    CHOL 109 05/16/2023     Lab Results   Component Value Date    HDL 49 05/09/2024    HDL 46 11/17/2023    HDL 49 05/16/2023     Lab Results   Component Value Date    LDLCALC 50 05/09/2024    LDLCALC 52 11/17/2023    LDLCALC 44 05/16/2023     Lab Results   Component Value Date    TRIG 95 05/09/2024    TRIG 104 11/17/2023    TRIG 82 05/16/2023       Lab Results   Component Value Date    CHOLHDL 2.4 05/09/2024    CHOLHDL 2.6 11/17/2023    CHOLHDL 2.2 05/16/2023   Lipitor 40 mg po daily  Lipids followed by PCP    Hypertension  110/64 mmHg        RTC 6 months

## 2024-08-20 NOTE — ASSESSMENT & PLAN NOTE
Lab Results   Component Value Date    CHOL 118 05/09/2024    CHOL 119 11/17/2023    CHOL 109 05/16/2023     Lab Results   Component Value Date    HDL 49 05/09/2024    HDL 46 11/17/2023    HDL 49 05/16/2023     Lab Results   Component Value Date    LDLCALC 50 05/09/2024    LDLCALC 52 11/17/2023    LDLCALC 44 05/16/2023     Lab Results   Component Value Date    TRIG 95 05/09/2024    TRIG 104 11/17/2023    TRIG 82 05/16/2023       Lab Results   Component Value Date    CHOLHDL 2.4 05/09/2024    CHOLHDL 2.6 11/17/2023    CHOLHDL 2.2 05/16/2023   Lipitor 40 mg po daily  Lipids followed by PCP

## 2024-08-20 NOTE — ASSESSMENT & PLAN NOTE
Chronic persistent  Admitted 7/3/2024-7/7/2024 for issues with RVR, dehydration and acute on chronic renal failure. Patient has had no issues since hospital DC.    EKG demonstrated a fib with HR 79 bpm;   Continue Cardizem  mg po daily; Digoxin .125 mg po daily; and Eliquis 2.5 mg po bid

## 2024-10-07 PROBLEM — N18.2 ACUTE RENAL FAILURE SUPERIMPOSED ON STAGE 2 CHRONIC KIDNEY DISEASE: Status: RESOLVED | Noted: 2024-07-07 | Resolved: 2024-10-07

## 2024-10-07 PROBLEM — N17.9 ACUTE RENAL FAILURE SUPERIMPOSED ON STAGE 2 CHRONIC KIDNEY DISEASE: Status: RESOLVED | Noted: 2024-07-07 | Resolved: 2024-10-07

## 2024-11-20 ENCOUNTER — LAB REQUISITION (OUTPATIENT)
Dept: LAB | Facility: HOSPITAL | Age: 84
End: 2024-11-20
Attending: FAMILY MEDICINE
Payer: MEDICARE

## 2024-11-20 DIAGNOSIS — N18.30 CHRONIC KIDNEY DISEASE, STAGE 3 UNSPECIFIED: ICD-10-CM

## 2024-11-20 DIAGNOSIS — I50.30 UNSPECIFIED DIASTOLIC (CONGESTIVE) HEART FAILURE: ICD-10-CM

## 2024-11-20 DIAGNOSIS — E78.5 HYPERLIPIDEMIA, UNSPECIFIED: ICD-10-CM

## 2024-11-20 DIAGNOSIS — E03.9 HYPOTHYROIDISM, UNSPECIFIED: ICD-10-CM

## 2024-11-20 LAB
ALBUMIN SERPL BCP-MCNC: 3 G/DL (ref 3.4–4.8)
ALBUMIN/GLOB SERPL: 1 {RATIO}
ALP SERPL-CCNC: 83 U/L (ref 40–150)
ALT SERPL W P-5'-P-CCNC: 13 U/L (ref 0–55)
ANION GAP SERPL CALCULATED.3IONS-SCNC: 14 MMOL/L (ref 7–16)
AST SERPL W P-5'-P-CCNC: 22 U/L (ref 5–34)
BASOPHILS # BLD AUTO: 0.04 K/UL (ref 0–0.2)
BASOPHILS NFR BLD AUTO: 0.7 % (ref 0–1)
BILIRUB SERPL-MCNC: 0.5 MG/DL
BUN SERPL-MCNC: 24 MG/DL (ref 10–20)
BUN/CREAT SERPL: 26 (ref 6–20)
CALCIUM SERPL-MCNC: 8.8 MG/DL (ref 8.4–10.2)
CHLORIDE SERPL-SCNC: 105 MMOL/L (ref 98–107)
CHOLEST SERPL-MCNC: 122 MG/DL
CHOLEST/HDLC SERPL: 3.2 {RATIO}
CK SERPL-CCNC: 29 U/L (ref 29–168)
CO2 SERPL-SCNC: 22 MMOL/L (ref 23–31)
CREAT SERPL-MCNC: 0.94 MG/DL (ref 0.55–1.02)
DIFFERENTIAL METHOD BLD: ABNORMAL
EGFR (NO RACE VARIABLE) (RUSH/TITUS): 59 ML/MIN/1.73M2
EOSINOPHIL # BLD AUTO: 0.15 K/UL (ref 0–0.5)
EOSINOPHIL NFR BLD AUTO: 2.5 % (ref 1–4)
ERYTHROCYTE [DISTWIDTH] IN BLOOD BY AUTOMATED COUNT: 16.7 % (ref 11.5–14.5)
GLOBULIN SER-MCNC: 3.1 G/DL (ref 2–4)
GLUCOSE SERPL-MCNC: 85 MG/DL (ref 82–115)
HCT VFR BLD AUTO: 43.2 % (ref 38–47)
HDLC SERPL-MCNC: 38 MG/DL (ref 35–60)
HGB BLD-MCNC: 13.5 G/DL (ref 12–16)
LDLC SERPL CALC-MCNC: 66 MG/DL
LDLC/HDLC SERPL: 1.7 {RATIO}
LYMPHOCYTES # BLD AUTO: 1.96 K/UL (ref 1–4.8)
LYMPHOCYTES NFR BLD AUTO: 32.2 % (ref 27–41)
MCH RBC QN AUTO: 27.7 PG (ref 27–31)
MCHC RBC AUTO-ENTMCNC: 31.3 G/DL (ref 32–36)
MCV RBC AUTO: 88.5 FL (ref 80–96)
MONOCYTES # BLD AUTO: 0.65 K/UL (ref 0–0.8)
MONOCYTES NFR BLD AUTO: 10.7 % (ref 2–6)
MPC BLD CALC-MCNC: 9.9 FL (ref 9.4–12.4)
NEUTROPHILS # BLD AUTO: 3.29 K/UL (ref 1.8–7.7)
NEUTROPHILS NFR BLD AUTO: 53.9 % (ref 53–65)
NONHDLC SERPL-MCNC: 84 MG/DL
PLATELET # BLD AUTO: 236 K/UL (ref 150–400)
POTASSIUM SERPL-SCNC: 3.8 MMOL/L (ref 3.5–5.1)
PROT SERPL-MCNC: 6.1 G/DL (ref 5.8–7.6)
RBC # BLD AUTO: 4.88 M/UL (ref 4.2–5.4)
SODIUM SERPL-SCNC: 137 MMOL/L (ref 136–145)
TRIGL SERPL-MCNC: 88 MG/DL (ref 37–140)
TSH SERPL DL<=0.005 MIU/L-ACNC: 5.04 UIU/ML (ref 0.35–4.94)
VLDLC SERPL-MCNC: 18 MG/DL
WBC # BLD AUTO: 6.09 K/UL (ref 4.5–11)

## 2024-11-20 PROCEDURE — 80053 COMPREHEN METABOLIC PANEL: CPT | Performed by: FAMILY MEDICINE

## 2024-11-20 PROCEDURE — 80061 LIPID PANEL: CPT | Performed by: FAMILY MEDICINE

## 2024-11-20 PROCEDURE — 84443 ASSAY THYROID STIM HORMONE: CPT | Performed by: FAMILY MEDICINE

## 2024-11-20 PROCEDURE — 82550 ASSAY OF CK (CPK): CPT | Performed by: FAMILY MEDICINE

## 2024-11-20 PROCEDURE — 85025 COMPLETE CBC W/AUTO DIFF WBC: CPT | Performed by: FAMILY MEDICINE

## 2024-12-08 ENCOUNTER — HOSPITAL ENCOUNTER (EMERGENCY)
Facility: HOSPITAL | Age: 84
Discharge: HOME | End: 2024-12-08
Payer: MEDICARE

## 2024-12-08 VITALS
DIASTOLIC BLOOD PRESSURE: 66 MMHG | RESPIRATION RATE: 18 BRPM | SYSTOLIC BLOOD PRESSURE: 143 MMHG | TEMPERATURE: 98 F | HEIGHT: 63 IN | HEART RATE: 85 BPM | WEIGHT: 124 LBS | BODY MASS INDEX: 21.97 KG/M2 | OXYGEN SATURATION: 97 %

## 2024-12-08 DIAGNOSIS — M25.521 RIGHT ELBOW PAIN: ICD-10-CM

## 2024-12-08 DIAGNOSIS — M25.551 BILATERAL HIP PAIN: ICD-10-CM

## 2024-12-08 DIAGNOSIS — M25.521 ELBOW PAIN, RIGHT: ICD-10-CM

## 2024-12-08 DIAGNOSIS — M25.552 BILATERAL HIP PAIN: ICD-10-CM
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
## 2024-12-08 DIAGNOSIS — M25.559 HIP PAIN: ICD-10-CM

## 2024-12-08 LAB
ALBUMIN SERPL BCP-MCNC: 3.5 G/DL (ref 3.4–4.8)
ALBUMIN/GLOB SERPL: 0.9 {RATIO}
ALP SERPL-CCNC: 107 U/L (ref 40–150)
ALT SERPL W P-5'-P-CCNC: 15 U/L
ANION GAP SERPL CALCULATED.3IONS-SCNC: 17 MMOL/L (ref 7–16)
AST SERPL W P-5'-P-CCNC: 37 U/L (ref 5–34)
BACTERIA #/AREA URNS HPF: ABNORMAL /HPF
BASOPHILS # BLD AUTO: 0.03 K/UL (ref 0–0.2)
BASOPHILS NFR BLD AUTO: 0.3 % (ref 0–1)
BILIRUB SERPL-MCNC: 0.6 MG/DL
BILIRUB UR QL STRIP: NEGATIVE
BUN SERPL-MCNC: 18 MG/DL (ref 10–20)
BUN/CREAT SERPL: 20 (ref 6–20)
CALCIUM SERPL-MCNC: 9.7 MG/DL (ref 8.4–10.2)
CHLORIDE SERPL-SCNC: 105 MMOL/L (ref 98–107)
CLARITY UR: CLEAR
CO2 SERPL-SCNC: 22 MMOL/L (ref 23–31)
COLOR UR: YELLOW
CREAT SERPL-MCNC: 0.89 MG/DL (ref 0.55–1.02)
DIFFERENTIAL METHOD BLD: ABNORMAL
EGFR (NO RACE VARIABLE) (RUSH/TITUS): 63 ML/MIN/1.73M2
EOSINOPHIL # BLD AUTO: 0.05 K/UL (ref 0–0.5)
EOSINOPHIL NFR BLD AUTO: 0.5 % (ref 1–4)
ERYTHROCYTE [DISTWIDTH] IN BLOOD BY AUTOMATED COUNT: 16.9 % (ref 11.5–14.5)
GLOBULIN SER-MCNC: 3.7 G/DL (ref 2–4)
GLUCOSE SERPL-MCNC: 119 MG/DL (ref 82–115)
GLUCOSE UR STRIP-MCNC: NEGATIVE MG/DL
HCT VFR BLD AUTO: 43.9 % (ref 38–47)
HGB BLD-MCNC: 14 G/DL (ref 12–16)
KETONES UR STRIP-SCNC: NEGATIVE MG/DL
LEUKOCYTE ESTERASE UR QL STRIP: NEGATIVE
LYMPHOCYTES # BLD AUTO: 1.34 K/UL (ref 1–4.8)
LYMPHOCYTES NFR BLD AUTO: 13.2 % (ref 27–41)
MCH RBC QN AUTO: 28.3 PG (ref 27–31)
MCHC RBC AUTO-ENTMCNC: 31.9 G/DL (ref 32–36)
MCV RBC AUTO: 88.9 FL (ref 80–96)
MONOCYTES # BLD AUTO: 0.95 K/UL (ref 0–0.8)
MONOCYTES NFR BLD AUTO: 9.3 % (ref 2–6)
MPC BLD CALC-MCNC: 9.5 FL (ref 9.4–12.4)
NEUTROPHILS # BLD AUTO: 7.82 K/UL (ref 1.8–7.7)
NEUTROPHILS NFR BLD AUTO: 76.7 % (ref 53–65)
NITRITE UR QL STRIP: NEGATIVE
PH UR STRIP: 6 PH UNITS
PLATELET # BLD AUTO: 239 K/UL (ref 150–400)
POTASSIUM SERPL-SCNC: 3.7 MMOL/L (ref 3.5–5.1)
PROT SERPL-MCNC: 7.2 G/DL (ref 5.8–7.6)
PROT UR QL STRIP: 100
RBC # BLD AUTO: 4.94 M/UL (ref 4.2–5.4)
RBC # UR STRIP: NEGATIVE /UL
RBC #/AREA URNS HPF: ABNORMAL /HPF
SODIUM SERPL-SCNC: 140 MMOL/L (ref 136–145)
SP GR UR STRIP: >=1.03
SQUAMOUS #/AREA URNS LPF: ABNORMAL /LPF
UROBILINOGEN UR STRIP-ACNC: 0.2 MG/DL
WBC # BLD AUTO: 10.19 K/UL (ref 4.5–11)
WBC #/AREA URNS HPF: ABNORMAL /HPF

## 2024-12-08 PROCEDURE — 99284 EMERGENCY DEPT VISIT MOD MDM: CPT | Mod: GF | Performed by: NURSE PRACTITIONER

## 2024-12-08 PROCEDURE — 99285 EMERGENCY DEPT VISIT HI MDM: CPT | Mod: 25

## 2024-12-08 PROCEDURE — 85025 COMPLETE CBC W/AUTO DIFF WBC: CPT | Performed by: NURSE PRACTITIONER

## 2024-12-08 PROCEDURE — 80053 COMPREHEN METABOLIC PANEL: CPT | Performed by: NURSE PRACTITIONER

## 2024-12-08 PROCEDURE — 36415 COLL VENOUS BLD VENIPUNCTURE: CPT | Performed by: NURSE PRACTITIONER

## 2024-12-08 PROCEDURE — 81003 URINALYSIS AUTO W/O SCOPE: CPT | Performed by: NURSE PRACTITIONER

## 2024-12-08 NOTE — ED NOTES
Spoke with Hope , staff Nurse from [facility] and updated her on patients results from the trip.

## 2024-12-08 NOTE — ED PROVIDER NOTES
Encounter Date: 12/8/2024       History     Chief Complaint   Patient presents with    Fall     Pt fell from bed and struck her right arm and right hip     Reported to have fallen, has complaint of bilateral hip pain and right elbow pain, unable to verbally communicate event piror to admission to ED, only response to express pain with examination    The history is provided by the nursing home. No  was used.     Review of patient's allergies indicates:  No Known Allergies  Past Medical History:   Diagnosis Date    Atrial fibrillation     Coronary artery disease     CVA (cerebral vascular accident)     2021    GERD (gastroesophageal reflux disease)     Hyperlipidemia     Hypertension     Hypothyroidism     Pulmonary emboli     PVD (peripheral vascular disease)     Vitamin D deficiency      Past Surgical History:   Procedure Laterality Date    APPENDECTOMY      CARDIAC CATHETERIZATION Left 04/2013    CATARACT EXTRACTION       Family History   Problem Relation Name Age of Onset    Diabetes Mother      Hypertension Mother      Heart disease Mother      Cancer Mother      Heart disease Father      Hypertension Father      Parkinsonism Sister      Alcohol abuse Daughter       Social History     Tobacco Use    Smoking status: Never    Smokeless tobacco: Never   Substance Use Topics    Alcohol use: Never    Drug use: Never     Review of Systems   Musculoskeletal:  Positive for joint swelling.        Right elbow pain with guarding during flexion and extension during exam, has bilateral hip pain and pelvic pain   Neurological:  Positive for speech difficulty, weakness and light-headedness.   All other systems reviewed and are negative.      Physical Exam     Initial Vitals [12/08/24 0041]   BP Pulse Resp Temp SpO2   128/71 83 18 98.2 °F (36.8 °C) 95 %      MAP       --         Physical Exam    Constitutional: She appears well-developed and well-nourished.   HENT:   Head: Atraumatic.   Right Ear: External  ear normal.   Left Ear: External ear normal.   Nose: Nose normal. Mouth/Throat: Oropharynx is clear and moist.   Eyes: Conjunctivae are normal. Pupils are equal, round, and reactive to light.   Neck: Neck supple.   Normal range of motion.  Cardiovascular:  Normal rate, regular rhythm, normal heart sounds and intact distal pulses.           Pulmonary/Chest: Breath sounds normal.   Abdominal: Abdomen is soft. Bowel sounds are normal.   Musculoskeletal:         General: Tenderness present.      Cervical back: Normal range of motion and neck supple.      Comments: Has pain to right elbow during flexion and extension with examination, has bilateral hip pain during exam, possible pelvic pain     Neurological:   No oriented to situation, has minimal verbalization ability, cries out in pain during exam   Skin: Skin is warm and dry. Capillary refill takes less than 2 seconds.         Medical Screening Exam   See Full Note    ED Course   Procedures  Labs Reviewed   COMPREHENSIVE METABOLIC PANEL - Abnormal       Result Value    Sodium 140      Potassium 3.7      Chloride 105      CO2 22 (*)     Anion Gap 17 (*)     Glucose 119 (*)     BUN 18      Creatinine 0.89      BUN/Creatinine Ratio 20      Calcium 9.7      Total Protein 7.2      Albumin 3.5      Globulin 3.7      A/G Ratio 0.9      Bilirubin, Total 0.6      Alk Phos 107      ALT 15      AST 37 (*)     eGFR 63     URINALYSIS, REFLEX TO URINE CULTURE - Abnormal    Color, UA Yellow      Clarity, UA Clear      pH, UA 6.0      Leukocytes, UA Negative      Nitrites, UA Negative      Protein,  (*)     Glucose, UA Negative      Ketones, UA Negative      Urobilinogen, UA 0.2      Bilirubin, UA Negative      Blood, UA Negative      Specific Gravity, UA >=1.030 (*)    CBC WITH DIFFERENTIAL - Abnormal    WBC 10.19      RBC 4.94      Hemoglobin 14.0      Hematocrit 43.9      MCV 88.9      MCH 28.3      MCHC 31.9 (*)     RDW 16.9 (*)     Platelet Count 239      MPV 9.5       Neutrophils % 76.7 (*)     Lymphocytes % 13.2 (*)     Neutrophils, Abs 7.82 (*)     Lymphocytes, Absolute 1.34      Diff Type Auto      Monocytes % 9.3 (*)     Eosinophils % 0.5 (*)     Basophils % 0.3      Monocytes, Absolute 0.95 (*)     Eosinophils, Absolute 0.05      Basophils, Absolute 0.03     URINALYSIS, MICROSCOPIC - Abnormal    WBC, UA 0-5      RBC, UA 0-3      Bacteria, UA Few (*)     Squamous Epithelial Cells, UA Many (*)    CBC W/ AUTO DIFFERENTIAL    Narrative:     The following orders were created for panel order CBC Auto Differential.  Procedure                               Abnormality         Status                     ---------                               -----------         ------                     CBC with Differential[[id number]]       Abnormal            Final result                 Please view results for these tests on the individual orders.          Imaging Results              X-Ray Elbow Complete Right (In process)                      X-Ray Hips Bilateral 2 View Incl AP Pelvis (In process)                      CT Head Without Contrast (In process)                      Medications - No data to display  Medical Decision Making             ED Course as of 12/08/24 0438   Sun Dec 08, 2024   0433 Results reviewed and discussed with patient and family member [BP]      ED Course User Index  [BP] Jimmy Grissom NP                           Clinical Impression:   Final diagnoses:  [M25.559] Hip pain  [M25.521] Right elbow pain  [W19.XXXA] Fall, initial encounter (Primary)  [M25.521] Elbow pain, right  [M25.551, M25.552] Bilateral hip pain        ED Disposition Condition    Discharge Stable          ED Prescriptions    None       Follow-up Information       Follow up With Specialties Details Why Contact Info    Daniel Bob, DO Family Medicine In 1 week As needed [id number]  [facility] [place] [address]  [phone]               Jimmy Grissom NP  12/08/24 2871

## 2024-12-20 ENCOUNTER — LAB REQUISITION (OUTPATIENT)
Dept: LAB | Facility: HOSPITAL | Age: 84
End: 2024-12-20
Attending: FAMILY MEDICINE
Payer: MEDICARE

## 2024-12-20 DIAGNOSIS — E03.9 HYPOTHYROIDISM, UNSPECIFIED: ICD-10-CM

## 2024-12-20 LAB — TSH SERPL DL<=0.005 MIU/L-ACNC: 7.45 UIU/ML (ref 0.35–4.94)

## 2024-12-20 PROCEDURE — 84443 ASSAY THYROID STIM HORMONE: CPT | Performed by: FAMILY MEDICINE

## 2025-01-17 ENCOUNTER — LAB REQUISITION (OUTPATIENT)
Dept: LAB | Facility: HOSPITAL | Age: 85
End: 2025-01-17
Attending: FAMILY MEDICINE
Payer: MEDICARE

## 2025-01-17 DIAGNOSIS — E03.9 HYPOTHYROIDISM, UNSPECIFIED: ICD-10-CM

## 2025-01-17 LAB — TSH SERPL DL<=0.005 MIU/L-ACNC: 4.2 UIU/ML (ref 0.35–4.94)

## 2025-01-17 PROCEDURE — 84443 ASSAY THYROID STIM HORMONE: CPT | Performed by: FAMILY MEDICINE

## 2025-02-12 ENCOUNTER — LAB REQUISITION (OUTPATIENT)
Dept: LAB | Facility: HOSPITAL | Age: 85
End: 2025-02-12
Attending: FAMILY MEDICINE
Payer: MEDICARE

## 2025-02-12 DIAGNOSIS — R87.5: ICD-10-CM

## 2025-02-12 PROCEDURE — 87070 CULTURE OTHR SPECIMN AEROBIC: CPT | Performed by: FAMILY MEDICINE

## 2025-02-15 LAB
BACTERIA BIOCHEM PROFILE ISLT CULT: ABNORMAL
BACTERIA BIOCHEM PROFILE ISLT CULT: ABNORMAL

## 2025-02-25 ENCOUNTER — OFFICE VISIT (OUTPATIENT)
Dept: CARDIOLOGY | Facility: CLINIC | Age: 85
End: 2025-02-25
Payer: MEDICARE

## 2025-02-25 VITALS
OXYGEN SATURATION: 93 % | DIASTOLIC BLOOD PRESSURE: 67 MMHG | HEIGHT: 63 IN | BODY MASS INDEX: 21.97 KG/M2 | SYSTOLIC BLOOD PRESSURE: 110 MMHG | HEART RATE: 82 BPM

## 2025-02-25 DIAGNOSIS — I48.91 ATRIAL FIBRILLATION, UNSPECIFIED TYPE: Primary | ICD-10-CM

## 2025-02-25 DIAGNOSIS — I10 HYPERTENSION, UNSPECIFIED TYPE: ICD-10-CM

## 2025-02-25 DIAGNOSIS — I25.10 CORONARY ARTERY DISEASE INVOLVING NATIVE CORONARY ARTERY OF NATIVE HEART WITHOUT ANGINA PECTORIS: ICD-10-CM

## 2025-02-25 DIAGNOSIS — E78.5 HYPERLIPIDEMIA, UNSPECIFIED HYPERLIPIDEMIA TYPE: ICD-10-CM

## 2025-02-25 PROCEDURE — 93005 ELECTROCARDIOGRAM TRACING: CPT | Mod: PBBFAC | Performed by: INTERNAL MEDICINE

## 2025-02-25 PROCEDURE — 99999 PR PBB SHADOW E&M-EST. PATIENT-LVL V: CPT | Mod: PBBFAC,,, | Performed by: NURSE PRACTITIONER

## 2025-02-25 PROCEDURE — 99214 OFFICE O/P EST MOD 30 MIN: CPT | Mod: S$PBB,,, | Performed by: NURSE PRACTITIONER

## 2025-02-25 PROCEDURE — 93010 ELECTROCARDIOGRAM REPORT: CPT | Mod: S$PBB,,, | Performed by: INTERNAL MEDICINE

## 2025-02-25 PROCEDURE — 99215 OFFICE O/P EST HI 40 MIN: CPT | Mod: PBBFAC | Performed by: NURSE PRACTITIONER

## 2025-02-25 RX ORDER — LEVOTHYROXINE SODIUM 88 UG/1
88 TABLET ORAL
COMMUNITY

## 2025-02-25 RX ORDER — ADHESIVE BANDAGE
30 BANDAGE TOPICAL DAILY PRN
COMMUNITY

## 2025-02-25 RX ORDER — SULFAMETHOXAZOLE AND TRIMETHOPRIM 800; 160 MG/1; MG/1
1 TABLET ORAL 2 TIMES DAILY
COMMUNITY

## 2025-02-25 NOTE — PROGRESS NOTES
PCP: Daniel Bob DO    Referring Provider:   Chief Complaint   Patient presents with    Atrial Fibrillation    Coronary Artery Disease    Follow-up     Patient denies any cardiac complaints today.       Subjective:   Abdias Carver is a 84 y.o. female with hx of mild, nonobstructive CAD, chronic, persistent A fib, GERD, HLD, and HTN,  who presents for routine follow up. She is doing well and denies complaints.    8/20/2024 presents for HD follow up for admission to [facility] for dehydration, acute on chronic renal failure and a fib with RVR. The patient has returned to the nursing home and is doing well. She states that she has occasional SOB. She also has a severe dementia and is a DNR.     2/20/2024 presents for routine follow up. Patient denies complaints.    7/26/2023 presents for routine follow up. She denies complaints other than lower ext edema that resolves with elevation.         Fhx:  Family History   Problem Relation Name Age of Onset    Diabetes Mother      Hypertension Mother      Heart disease Mother      Cancer Mother      Heart disease Father      Hypertension Father      Parkinsonism Sister      Alcohol abuse Daughter       Shx:   Social History     Socioeconomic History    Marital status:    Tobacco Use    Smoking status: Never    Smokeless tobacco: Never   Substance and Sexual Activity    Alcohol use: Never    Drug use: Never    Sexual activity: Not Currently     Social Drivers of Health     Financial Resource Strain: Low Risk  (7/3/2024)    Overall Financial Resource Strain (CARDIA)     Difficulty of Paying Living Expenses: Not hard at all   Food Insecurity: No Food Insecurity (7/3/2024)    Hunger Vital Sign     Worried About Running Out of Food in the Last Year: Never true     Ran Out of Food in the Last Year: Never true   Transportation Needs: No Transportation Needs (7/3/2024)    TRANSPORTATION NEEDS     Transportation : No   Physical Activity: Inactive (7/3/2024)     Exercise Vital Sign     Days of Exercise per Week: 0 days     Minutes of Exercise per Session: 0 min   Stress: Patient Unable To Answer (7/3/2024)    [employer] [place] [employer] - Occupational Stress Questionnaire     Feeling of Stress : Patient unable to answer   Housing Stability: Low Risk  (11/9/2022)    Housing Stability Vital Sign     Unable to Pay for Housing in the Last Year: No     Number of Places Lived in the Last Year: 1     Unstable Housing in the Last Year: No       EKG   2/25/2025 a fib with HR 73 bpm; low voltage QRS; possible anterolateral infarct (cited on or before 2/29/2023)  8/20/2024 atrial fibrillation ; HR 79 bpm; low voltage QRS; can not r/o anteroseptal infarct (cited on or before 7/26/2023); when compared with EKG of 7/6/2024 vent rate has decreased by 52 bpm and serial changes of anteroseptal infarct present  2/20/2024 a fib with ventricular rate 126 bpm; can not r/o anteroseptal infarct (cited on or before 7/26/2023); when compared with EKG 7/26/2023 NSTWA improved in Inferior leads; NSTWA no longer evident in lateral leads  7/26/2023 a fib with ventricular rate 105 bpm; septal infarct  2/9/2023 afib with ventricualr rate 96 bpm; anteroseptal infarct    Results for orders placed during the hospital encounter of 07/03/24    Echo    Interpretation Summary    Left Ventricle: The left ventricle is normal in size. Normal wall thickness. There is low normal systolic function with a visually estimated ejection fraction of 50 - 55%. Ejection fraction by visual approximation is 50%.    Right Ventricle: Normal right ventricular cavity size.    Left Atrium: Left atrium is mildly dilated.    Aortic Valve: The aortic valve is a trileaflet valve. There is moderate aortic valve sclerosis. Mildly calcified cusps. There is mild to moderate aortic regurgitation.    Mitral Valve: There is moderate bileaflet sclerosis. Moderately thickened leaflets. Moderately calcified leaflets. There is no  stenosis. The mean pressure gradient across the mitral valve is 3 mmHg at a heart rate of  bpm. There is mild regurgitation.    Tricuspid Valve: There is mild regurgitation.  No pulmonary hypertension.    ECHO Results for orders placed during the hospital encounter of 11/08/22    Echo    Interpretation Summary  · Atrial fibrillation observed.  · The left ventricle is normal in size with concentric remodeling and low normal systolic function.  · The estimated ejection fraction is 50%.  · Normal right ventricular size with normal right ventricular systolic function.  · Normal left ventricular diastolic function.  · The mean diastolic gradient across the mitral valve is 3 mmHg at a heart rate of 77 bpm.  · There is mild to moderate mitral stenosis.  · Moderate left atrial enlargement.  · Moderate right atrial enlargement.  · Normal central venous pressure (3 mmHg).  · The estimated PA systolic pressure is 22 mmHg.    [facility] 4/2/2013  Mild, non-obstructive CAD  Janet LV size and systolic function  No significant MR      Lab Results   Component Value Date     12/08/2024    K 3.7 12/08/2024     12/08/2024    CO2 22 (L) 12/08/2024    BUN 18 12/08/2024    CREATININE 0.89 12/08/2024    CALCIUM 9.7 12/08/2024    ANIONGAP 17 (H) 12/08/2024    ESTGFRAFRICA 46 05/09/2021    EGFRNONAA 48 (L) 06/07/2022       Lab Results   Component Value Date    CHOL 122 11/20/2024    CHOL 118 05/09/2024    CHOL 119 11/17/2023     Lab Results   Component Value Date    HDL 38 11/20/2024    HDL 49 05/09/2024    HDL 46 11/17/2023     Lab Results   Component Value Date    LDLCALC 66 11/20/2024    LDLCALC 50 05/09/2024    LDLCALC 52 11/17/2023     Lab Results   Component Value Date    TRIG 88 11/20/2024    TRIG 95 05/09/2024    TRIG 104 11/17/2023     Lab Results   Component Value Date    CHOLHDL 3.2 11/20/2024    CHOLHDL 2.4 05/09/2024    CHOLHDL 2.6 11/17/2023       Lab Results   Component Value Date    WBC 10.19 12/08/2024    HGB 14.0  12/08/2024    HCT 43.9 12/08/2024    MCV 88.9 12/08/2024     12/08/2024           Current Outpatient Medications:     acetaminophen (TYLENOL) 500 MG tablet, Take 500 mg by mouth every 6 (six) hours as needed for Pain., Disp: , Rfl:     aspirin 81 MG Chew, Take 1 tablet (81 mg total) by mouth once daily., Disp: 30 tablet, Rfl: 0    atorvastatin (LIPITOR) 40 MG tablet, Take 1 tablet (40 mg total) by mouth every evening., Disp: 30 tablet, Rfl: 5    digoxin (LANOXIN) 125 mcg tablet, Take 1 tablet (125 mcg total) by mouth once daily., Disp: , Rfl:     diltiaZEM (CARDIZEM CD) 240 MG 24 hr capsule, Take 240 mg by mouth once daily., Disp: , Rfl:     docusate sodium (COLACE) 100 MG capsule, Take 1 capsule (100 mg total) by mouth once daily. (Patient taking differently: Take 100 mg by mouth 2 (two) times daily.), Disp: , Rfl:     ELIQUIS 2.5 mg Tab, Take 2.5 mg by mouth 2 (two) times daily., Disp: , Rfl:     furosemide (LASIX) 20 MG tablet, Take 0.5 tablets (10 mg total) by mouth once daily., Disp: 30 tablet, Rfl: 0    levothyroxine (SYNTHROID) 88 MCG tablet, Take 88 mcg by mouth before breakfast., Disp: , Rfl:     magnesium hydroxide 400 mg/5 ml (MILK OF MAGNESIA) 400 mg/5 mL Susp, Take 30 mLs by mouth daily as needed., Disp: , Rfl:     metoclopramide HCl (REGLAN) 5 MG tablet, Take 5 mg by mouth 3 (three) times daily before meals., Disp: , Rfl:     multivitamin (ONE DAILY MULTIVITAMIN) per tablet, Take 1 tablet by mouth once daily., Disp: , Rfl:     nitroGLYCERIN (NITROSTAT) 0.4 MG SL tablet, Place 1 tablet (0.4 mg total) under the tongue every 5 (five) minutes as needed for Chest pain. Put 1 tablet under your tongue as needed for chest pain; may take up to 3 doses 5 minutes apart. If no resolutiion of CP go to the nearest ED for evaluation., Disp: 25 tablet, Rfl: 3    omeprazole (PRILOSEC OTC) 20 MG tablet, Take 1 tablet (20 mg total) by mouth once daily., Disp: 30 tablet, Rfl: 5    ondansetron (ZOFRAN) 4 MG tablet,  "Take 4 mg by mouth every 6 (six) hours as needed for Nausea., Disp: , Rfl:     polyethylene glycol (GLYCOLAX) 17 gram/dose powder, Take 17 g by mouth once daily., Disp: , Rfl:     sulfamethoxazole-trimethoprim 800-160mg (BACTRIM DS) 800-160 mg Tab, Take 1 tablet by mouth 2 (two) times daily., Disp: , Rfl:   Meds reviewed and reconciled using the list from the nursing home.       Review of Systems   Respiratory:  Negative for shortness of breath.    Cardiovascular:  Negative for chest pain, palpitations, orthopnea, claudication, leg swelling and PND.             Neurological:  Negative for dizziness, loss of consciousness and weakness.           Objective:   /67 (BP Location: Left arm, Patient Position: Sitting)   Pulse 82   Ht 5' 3" (1.6 m)   SpO2 (!) 93%   BMI 21.97 kg/m²     Physical Exam  Vitals and nursing note reviewed.   Constitutional:       Appearance: Normal appearance. She is normal weight.      Comments: In a wheelchair   HENT:      Head: Normocephalic and atraumatic.   Neck:      Vascular: No carotid bruit.   Cardiovascular:      Rate and Rhythm: Normal rate. Rhythm irregular.      Pulses: Normal pulses.      Heart sounds: Normal heart sounds.   Pulmonary:      Effort: Pulmonary effort is normal.      Breath sounds: Normal breath sounds.   Abdominal:      Palpations: Abdomen is soft.   Musculoskeletal:      Cervical back: Neck supple.      Right lower leg: No edema.      Left lower leg: No edema.   Skin:     General: Skin is warm and dry.      Capillary Refill: Capillary refill takes less than 2 seconds.   Neurological:      General: No focal deficit present.      Mental Status: She is alert.           Assessment:     1. Atrial fibrillation, unspecified type  EKG 12-lead      2. Coronary artery disease involving native coronary artery of native heart without angina pectoris        3. Hyperlipidemia, unspecified hyperlipidemia type        4. Hypertension, unspecified type              Plan: "   Atrial fibrillation  Chronic, persistent  Rate controlled  Continue CVA prophylaxis with Eliquis    Coronary artery disease involving native coronary artery of native heart  Mild, non-obstructive CAD by [facility] 4/2/2013  Asymptomatic  Continue ASA/Lipitor    Hyperlipidemia  Lab Results   Component Value Date    CHOL 122 11/20/2024    CHOL 118 05/09/2024    CHOL 119 11/17/2023     Lab Results   Component Value Date    HDL 38 11/20/2024    HDL 49 05/09/2024    HDL 46 11/17/2023     Lab Results   Component Value Date    LDLCALC 66 11/20/2024    LDLCALC 50 05/09/2024    LDLCALC 52 11/17/2023     Lab Results   Component Value Date    TRIG 88 11/20/2024    TRIG 95 05/09/2024    TRIG 104 11/17/2023       Lab Results   Component Value Date    CHOLHDL 3.2 11/20/2024    CHOLHDL 2.4 05/09/2024    CHOLHDL 2.6 11/17/2023     Lipitor 40 mg po daily  Lipids followed by PCP    Hypertension  110/67 mmHg        RTC 6 months

## 2025-02-25 NOTE — ASSESSMENT & PLAN NOTE
Lab Results   Component Value Date    CHOL 122 11/20/2024    CHOL 118 05/09/2024    CHOL 119 11/17/2023     Lab Results   Component Value Date    HDL 38 11/20/2024    HDL 49 05/09/2024    HDL 46 11/17/2023     Lab Results   Component Value Date    LDLCALC 66 11/20/2024    LDLCALC 50 05/09/2024    LDLCALC 52 11/17/2023     Lab Results   Component Value Date    TRIG 88 11/20/2024    TRIG 95 05/09/2024    TRIG 104 11/17/2023       Lab Results   Component Value Date    CHOLHDL 3.2 11/20/2024    CHOLHDL 2.4 05/09/2024    CHOLHDL 2.6 11/17/2023     Lipitor 40 mg po daily  Lipids followed by PCP   Stable WDL

## 2025-02-25 NOTE — ASSESSMENT & PLAN NOTE
Mild, non-obstructive CAD by [facility] 4/2/2013  Asymptomatic  Continue ASA/Lipitor

## 2025-02-26 LAB
OHS QRS DURATION: 66 MS
OHS QTC CALCULATION: 403 MS

## 2025-04-19 ENCOUNTER — LAB REQUISITION (OUTPATIENT)
Dept: LAB | Facility: HOSPITAL | Age: 85
End: 2025-04-19
Attending: FAMILY MEDICINE
Payer: MEDICARE

## 2025-04-19 DIAGNOSIS — K62.5 HEMORRHAGE OF ANUS AND RECTUM: ICD-10-CM

## 2025-04-19 LAB
BASOPHILS # BLD AUTO: 0.03 K/UL (ref 0–0.2)
BASOPHILS NFR BLD AUTO: 0.5 % (ref 0–1)
DIFFERENTIAL METHOD BLD: ABNORMAL
EOSINOPHIL # BLD AUTO: 0.18 K/UL (ref 0–0.5)
EOSINOPHIL NFR BLD AUTO: 3 % (ref 1–4)
ERYTHROCYTE [DISTWIDTH] IN BLOOD BY AUTOMATED COUNT: 16.5 % (ref 11.5–14.5)
HCT VFR BLD AUTO: 40.2 % (ref 38–47)
HGB BLD-MCNC: 12.4 G/DL (ref 12–16)
LYMPHOCYTES # BLD AUTO: 1.85 K/UL (ref 1–4.8)
LYMPHOCYTES NFR BLD AUTO: 30.5 % (ref 27–41)
MCH RBC QN AUTO: 27.9 PG (ref 27–31)
MCHC RBC AUTO-ENTMCNC: 30.8 G/DL (ref 32–36)
MCV RBC AUTO: 90.5 FL (ref 80–96)
MONOCYTES # BLD AUTO: 0.63 K/UL (ref 0–0.8)
MONOCYTES NFR BLD AUTO: 10.4 % (ref 2–6)
MPC BLD CALC-MCNC: 10 FL (ref 9.4–12.4)
NEUTROPHILS # BLD AUTO: 3.38 K/UL (ref 1.8–7.7)
NEUTROPHILS NFR BLD AUTO: 55.6 % (ref 53–65)
PLATELET # BLD AUTO: 199 K/UL (ref 150–400)
RBC # BLD AUTO: 4.44 M/UL (ref 4.2–5.4)
WBC # BLD AUTO: 6.07 K/UL (ref 4.5–11)

## 2025-04-19 PROCEDURE — 85025 COMPLETE CBC W/AUTO DIFF WBC: CPT | Performed by: FAMILY MEDICINE

## 2025-04-23 ENCOUNTER — LAB REQUISITION (OUTPATIENT)
Dept: LAB | Facility: HOSPITAL | Age: 85
End: 2025-04-23
Attending: FAMILY MEDICINE
Payer: MEDICARE

## 2025-04-23 DIAGNOSIS — R19.5 OTHER FECAL ABNORMALITIES: ICD-10-CM

## 2025-04-23 LAB — OCCULT BLOOD: NEGATIVE

## 2025-04-23 PROCEDURE — 82272 OCCULT BLD FECES 1-3 TESTS: CPT | Performed by: FAMILY MEDICINE

## 2025-04-24 ENCOUNTER — LAB REQUISITION (OUTPATIENT)
Dept: LAB | Facility: HOSPITAL | Age: 85
End: 2025-04-24
Attending: FAMILY MEDICINE
Payer: MEDICARE

## 2025-04-24 DIAGNOSIS — R19.5 OTHER FECAL ABNORMALITIES: ICD-10-CM

## 2025-04-24 LAB — OCCULT BLOOD: POSITIVE

## 2025-04-24 PROCEDURE — 82272 OCCULT BLD FECES 1-3 TESTS: CPT | Performed by: FAMILY MEDICINE

## 2025-05-20 ENCOUNTER — TELEPHONE (OUTPATIENT)
Dept: GASTROENTEROLOGY | Facility: CLINIC | Age: 85
End: 2025-05-20
Payer: MEDICARE

## 2025-05-22 ENCOUNTER — OFFICE VISIT (OUTPATIENT)
Dept: GASTROENTEROLOGY | Facility: CLINIC | Age: 85
End: 2025-05-22
Payer: MEDICARE

## 2025-05-22 VITALS
SYSTOLIC BLOOD PRESSURE: 158 MMHG | DIASTOLIC BLOOD PRESSURE: 74 MMHG | HEIGHT: 63 IN | OXYGEN SATURATION: 99 % | HEART RATE: 74 BPM | BODY MASS INDEX: 21.97 KG/M2

## 2025-05-22 DIAGNOSIS — K62.5 RECTAL BLEEDING: Primary | ICD-10-CM

## 2025-05-22 DIAGNOSIS — R19.5 POSITIVE FECAL OCCULT BLOOD TEST: ICD-10-CM

## 2025-05-22 DIAGNOSIS — K62.5 RECTAL BLEEDING: ICD-10-CM

## 2025-05-22 PROCEDURE — 99214 OFFICE O/P EST MOD 30 MIN: CPT | Mod: PBBFAC

## 2025-05-22 PROCEDURE — 99999 PR PBB SHADOW E&M-EST. PATIENT-LVL IV: CPT | Mod: PBBFAC,,,

## 2025-05-22 RX ORDER — POLYETHYLENE GLYCOL 3350, SODIUM SULFATE ANHYDROUS, SODIUM BICARBONATE, SODIUM CHLORIDE, POTASSIUM CHLORIDE 236; 22.74; 6.74; 5.86; 2.97 G/4L; G/4L; G/4L; G/4L; G/4L
4 POWDER, FOR SOLUTION ORAL ONCE
Qty: 4000 ML | Refills: 0 | Status: SHIPPED | OUTPATIENT
Start: 2025-05-22 | End: 2025-05-22 | Stop reason: SDUPTHER

## 2025-05-22 RX ORDER — POLYETHYLENE GLYCOL 3350, SODIUM SULFATE ANHYDROUS, SODIUM BICARBONATE, SODIUM CHLORIDE, POTASSIUM CHLORIDE 236; 22.74; 6.74; 5.86; 2.97 G/4L; G/4L; G/4L; G/4L; G/4L
4 POWDER, FOR SOLUTION ORAL ONCE
Qty: 4000 ML | Refills: 0 | Status: SHIPPED | OUTPATIENT
Start: 2025-05-22 | End: 2025-05-22

## 2025-05-22 NOTE — TELEPHONE ENCOUNTER
Call to ZANE Segura regarding rx sent to pt - verified that the pharmacy of [facility] [place] is correct - after w/ speaking w/ pharmacy stated script was not received for the pt - medication to be sent again to pharmacy

## 2025-05-22 NOTE — PROGRESS NOTES
CC:  Rectal bleeding, positive occult blood      HPI 84 y.o. white female presents today for positive occult blood in rectal bleeding.  Nurse's aide that is with the patient states they are no longer seeing the blood in her stool however she was having a good amount of blood mixed in with her stool when she would have a bowel movement.  A denies any nausea vomiting or dysphagia and denies any melena per nurse report.  Labs reviewed hemoglobin 12.4 hematocrit 40.2 ALT 15 AST 37  Colonoscopy 05/24/2016 suboptimal prep, unable to evaluate terminal ileum, moderate severe diverticulosis, polyp removed and internal hemorrhoids pathology reports no diagnostic abnormality.  EGD 04/05/2016 grade 1 erosive reflux induced esophagitis found in the GE junction distal esophageal be ring, 2 cm sliding hiatal hernia.  Two ulcers found at duodenal bulb, diffuse erosive duodenitis with 2 small superficial ulcers.  Pathology report shows mild chronic gastritis negative H pylori.  Medical records reviewed. Additional history supplemented by nursing.     Past Medical History:   Diagnosis Date    Atrial fibrillation     Coronary artery disease     CVA (cerebral vascular accident)     2021    GERD (gastroesophageal reflux disease)     Hyperlipidemia     Hypertension     Hypothyroidism     Pulmonary emboli     PVD (peripheral vascular disease)     Vitamin D deficiency          Past Surgical History:   Procedure Laterality Date    APPENDECTOMY      CARDIAC CATHETERIZATION Left 04/2013    CATARACT EXTRACTION         Social History  Social History[1]      Family History   Problem Relation Name Age of Onset    Diabetes Mother      Hypertension Mother      Heart disease Mother      Cancer Mother      Heart disease Father      Hypertension Father      Parkinsonism Sister      Alcohol abuse Daughter         Review of Systems   Gastrointestinal:  Positive for blood in stool and constipation. Negative for abdominal pain, diarrhea, heartburn,  melena, nausea and vomiting.        Per previous colonoscopy patient does have known internal hemorrhoids and moderate diverticulosis   All other systems reviewed and are negative.       Physical Exam  Constitutional:       General: She is not in acute distress.     Appearance: Normal appearance. She is not ill-appearing.   Abdominal:      General: Bowel sounds are normal. There is no distension.      Palpations: Abdomen is soft.      Tenderness: There is no abdominal tenderness.   Skin:     General: Skin is warm and dry.      Coloration: Skin is not jaundiced.   Neurological:      Mental Status: She is alert and oriented to person, place, and time.   Psychiatric:         Mood and Affect: Mood normal.         Behavior: Behavior normal.          Labs:  Lab Results   Component Value Date    WBC 6.07 04/19/2025    HGB 12.4 04/19/2025    HCT 40.2 04/19/2025    MCV 90.5 04/19/2025     04/19/2025       CMP  Sodium   Date Value Ref Range Status   12/08/2024 140 136 - 145 mmol/L Final     Potassium   Date Value Ref Range Status   12/08/2024 3.7 3.5 - 5.1 mmol/L Final     Chloride   Date Value Ref Range Status   12/08/2024 105 98 - 107 mmol/L Final     CO2   Date Value Ref Range Status   12/08/2024 22 (L) 23 - 31 mmol/L Final     Glucose   Date Value Ref Range Status   12/08/2024 119 (H) 82 - 115 mg/dL Final     BUN   Date Value Ref Range Status   12/08/2024 18 10 - 20 mg/dL Final     Creatinine   Date Value Ref Range Status   12/08/2024 0.89 0.55 - 1.02 mg/dL Final     Calcium   Date Value Ref Range Status   12/08/2024 9.7 8.4 - 10.2 mg/dL Final     Total Protein   Date Value Ref Range Status   12/08/2024 7.2 5.8 - 7.6 g/dL Final     Albumin   Date Value Ref Range Status   12/08/2024 3.5 3.4 - 4.8 g/dL Final     Bilirubin, Total   Date Value Ref Range Status   12/08/2024 0.6 <=1.5 mg/dL Final     Alk Phos   Date Value Ref Range Status   12/08/2024 107 40 - 150 U/L Final     AST   Date Value Ref Range Status    12/08/2024 37 (H) 5 - 34 U/L Final     ALT   Date Value Ref Range Status   12/08/2024 15 <=55 U/L Final     Anion Gap   Date Value Ref Range Status   12/08/2024 17 (H) 7 - 16 mmol/L Final     eGFR    Date Value Ref Range Status   05/09/2021 46       eGFR   Date Value Ref Range Status   06/07/2022 48 (L) >=60 mL/min/1.73m² Final       Imaging:  No pertinent imaging    Independently reviewed    Assessment: Abdias Carver 83 yo WF here with:  History of constipation  Positive occult blood  Rectal bleeding    Plan:   Colonoscopy scheduled for complaint of rectal bleeding and positive occult blood  Patient to have cardiac clearance due to history of heart disease in currently taking Eliquis daily  Follow-up as needed or sooner    I spent a total of 45 minutes on the day of the visit.This includes face to face time and non-face to face time preparing to see the patient (eg, review of tests), obtaining and/or reviewing separately obtained history, documenting clinical information in the electronic or other health record, independently interpreting results and communicating results to the patient/family/caregiver, or care coordinator.   Carla Adams, FNP [facility]           [1]   Social History  Tobacco Use    Smoking status: Never    Smokeless tobacco: Never   Substance Use Topics    Alcohol use: Never    Drug use: Never

## 2025-07-30 DIAGNOSIS — Z12.11 COLON CANCER SCREENING: Primary | ICD-10-CM

## 2025-08-14 ENCOUNTER — ANESTHESIA EVENT (OUTPATIENT)
Dept: GASTROENTEROLOGY | Facility: HOSPITAL | Age: 85
End: 2025-08-14
Payer: MEDICARE

## 2025-08-14 ENCOUNTER — HOSPITAL ENCOUNTER (OUTPATIENT)
Dept: GASTROENTEROLOGY | Facility: HOSPITAL | Age: 85
Discharge: HOME | End: 2025-08-14
Admitting: INTERNAL MEDICINE
Payer: MEDICARE

## 2025-08-14 ENCOUNTER — ANESTHESIA (OUTPATIENT)
Dept: GASTROENTEROLOGY | Facility: HOSPITAL | Age: 85
End: 2025-08-14
Payer: MEDICARE

## 2025-08-14 VITALS
DIASTOLIC BLOOD PRESSURE: 47 MMHG | RESPIRATION RATE: 18 BRPM | OXYGEN SATURATION: 100 % | SYSTOLIC BLOOD PRESSURE: 120 MMHG | HEIGHT: 63 IN | BODY MASS INDEX: 22.5 KG/M2 | HEART RATE: 90 BPM | TEMPERATURE: 97 F | WEIGHT: 127 LBS

## 2025-08-14 DIAGNOSIS — R19.5 POSITIVE FECAL OCCULT BLOOD TEST: ICD-10-CM

## 2025-08-14 DIAGNOSIS — K62.5 RECTAL BLEEDING: ICD-10-CM

## 2025-08-14 LAB — GLUCOSE SERPL-MCNC: 90 MG/DL (ref 70–105)

## 2025-08-14 PROCEDURE — 27000284 HC CANNULA NASAL: Performed by: NURSE ANESTHETIST, CERTIFIED REGISTERED

## 2025-08-14 PROCEDURE — 37000009 HC ANESTHESIA EA ADD 15 MINS

## 2025-08-14 PROCEDURE — 25000003 PHARM REV CODE 250: Performed by: NURSE ANESTHETIST, CERTIFIED REGISTERED

## 2025-08-14 PROCEDURE — 63600175 PHARM REV CODE 636 W HCPCS: Performed by: NURSE ANESTHETIST, CERTIFIED REGISTERED

## 2025-08-14 PROCEDURE — 88305 TISSUE EXAM BY PATHOLOGIST: CPT | Mod: TC,91,SUR | Performed by: INTERNAL MEDICINE

## 2025-08-14 PROCEDURE — 27201423 OPTIME MED/SURG SUP & DEVICES STERILE SUPPLY

## 2025-08-14 PROCEDURE — 37000008 HC ANESTHESIA 1ST 15 MINUTES

## 2025-08-14 PROCEDURE — 27000716 HC OXISENSOR PROBE, ANY SIZE: Performed by: NURSE ANESTHETIST, CERTIFIED REGISTERED

## 2025-08-14 RX ORDER — SODIUM CHLORIDE, SODIUM LACTATE, POTASSIUM CHLORIDE, CALCIUM CHLORIDE 600; 310; 30; 20 MG/100ML; MG/100ML; MG/100ML; MG/100ML
INJECTION, SOLUTION INTRAVENOUS CONTINUOUS
Status: DISCONTINUED | OUTPATIENT
Start: 2025-08-14 | End: 2025-08-15 | Stop reason: HOSPADM

## 2025-08-14 RX ORDER — PROPOFOL 10 MG/ML
VIAL (ML) INTRAVENOUS
Status: DISCONTINUED | OUTPATIENT
Start: 2025-08-14 | End: 2025-08-14

## 2025-08-14 RX ORDER — LIDOCAINE HYDROCHLORIDE 20 MG/ML
INJECTION, SOLUTION EPIDURAL; INFILTRATION; INTRACAUDAL; PERINEURAL
Status: DISCONTINUED | OUTPATIENT
Start: 2025-08-14 | End: 2025-08-14

## 2025-08-14 RX ORDER — SODIUM CHLORIDE 0.9 % (FLUSH) 0.9 %
10 SYRINGE (ML) INJECTION EVERY 6 HOURS PRN
Status: DISCONTINUED | OUTPATIENT
Start: 2025-08-14 | End: 2025-08-15 | Stop reason: HOSPADM

## 2025-08-14 RX ADMIN — LIDOCAINE HYDROCHLORIDE 50 MG: 20 INJECTION, SOLUTION EPIDURAL; INFILTRATION; INTRACAUDAL; PERINEURAL at 12:08

## 2025-08-14 RX ADMIN — PROPOFOL 50 MG: 10 INJECTION, EMULSION INTRAVENOUS at 12:08

## 2025-08-14 RX ADMIN — SODIUM CHLORIDE: 9 INJECTION, SOLUTION INTRAVENOUS at 11:08

## 2025-08-14 RX ADMIN — PROPOFOL 20 MG: 10 INJECTION, EMULSION INTRAVENOUS at 12:08

## 2025-08-15 LAB
ESTROGEN SERPL-MCNC: NORMAL PG/ML
INSULIN SERPL-ACNC: NORMAL U[IU]/ML
LAB AP GROSS DESCRIPTION: NORMAL
LAB AP LABORATORY NOTES: NORMAL
T3RU NFR SERPL: NORMAL %

## 2025-08-25 ENCOUNTER — TELEPHONE (OUTPATIENT)
Dept: GASTROENTEROLOGY | Facility: CLINIC | Age: 85
End: 2025-08-25
Payer: MEDICARE